# Patient Record
Sex: FEMALE | Race: ASIAN | NOT HISPANIC OR LATINO | Employment: FULL TIME | URBAN - METROPOLITAN AREA
[De-identification: names, ages, dates, MRNs, and addresses within clinical notes are randomized per-mention and may not be internally consistent; named-entity substitution may affect disease eponyms.]

---

## 2017-01-17 ENCOUNTER — HOSPITAL ENCOUNTER (EMERGENCY)
Facility: CLINIC | Age: 47
Discharge: HOME | End: 2017-01-17
Attending: EMERGENCY MEDICINE | Admitting: EMERGENCY MEDICINE
Payer: COMMERCIAL

## 2017-01-17 ENCOUNTER — APPOINTMENT (OUTPATIENT)
Dept: CT IMAGING | Facility: CLINIC | Age: 47
End: 2017-01-17
Attending: EMERGENCY MEDICINE
Payer: COMMERCIAL

## 2017-01-17 VITALS
HEART RATE: 83 BPM | TEMPERATURE: 97.8 F | DIASTOLIC BLOOD PRESSURE: 97 MMHG | OXYGEN SATURATION: 100 % | SYSTOLIC BLOOD PRESSURE: 125 MMHG | RESPIRATION RATE: 16 BRPM

## 2017-01-17 DIAGNOSIS — S06.0X0A CLOSED HEAD INJURY WITH CONCUSSION, WITHOUT LOSS OF CONSCIOUSNESS, INITIAL ENCOUNTER: ICD-10-CM

## 2017-01-17 PROCEDURE — 25900017 H RX MED GY IP 259 OP 259 PS 637: Performed by: EMERGENCY MEDICINE

## 2017-01-17 PROCEDURE — 70450 CT HEAD/BRAIN W/O DYE: CPT

## 2017-01-17 PROCEDURE — 99284 EMERGENCY DEPT VISIT MOD MDM: CPT | Mod: 25

## 2017-01-17 RX ORDER — ONDANSETRON 4 MG/1
4 TABLET, ORALLY DISINTEGRATING ORAL EVERY 8 HOURS PRN
Qty: 20 TABLET | Refills: 0 | Status: SHIPPED | OUTPATIENT
Start: 2017-01-17 | End: 2017-01-20

## 2017-01-17 RX ORDER — MECLIZINE HYDROCHLORIDE 25 MG/1
25 TABLET ORAL ONCE
Status: COMPLETED | OUTPATIENT
Start: 2017-01-17 | End: 2017-01-17

## 2017-01-17 RX ORDER — ONDANSETRON 2 MG/ML
4 INJECTION INTRAMUSCULAR; INTRAVENOUS ONCE
Status: DISCONTINUED | OUTPATIENT
Start: 2017-01-17 | End: 2017-01-17

## 2017-01-17 RX ORDER — ONDANSETRON 4 MG/1
4 TABLET, ORALLY DISINTEGRATING ORAL ONCE
Status: DISCONTINUED | OUTPATIENT
Start: 2017-01-17 | End: 2017-01-17 | Stop reason: HOSPADM

## 2017-01-17 RX ORDER — MECLIZINE HYDROCHLORIDE 25 MG/1
25 TABLET ORAL 4 TIMES DAILY PRN
Qty: 30 TABLET | Refills: 0 | Status: SHIPPED | OUTPATIENT
Start: 2017-01-17 | End: 2017-02-09

## 2017-01-17 RX ADMIN — MECLIZINE HYDROCHLORIDE 25 MG: 25 TABLET ORAL at 16:27

## 2017-01-17 ASSESSMENT — ENCOUNTER SYMPTOMS
NAUSEA: 1
BACK PAIN: 0
NECK PAIN: 0
HEADACHES: 1
DIZZINESS: 1

## 2017-01-17 NOTE — ED AVS SNAPSHOT
[facility]    [address]    [facility] [address]    Phone:  [phone]    Fax:  [phone]                                       Swetha Flores   MRN: [id number]    Department:  [facility]   Date of Visit:  1/17/2017           Patient Information     Date Of Birth          1970        Your diagnoses for this visit were:     Closed head injury with concussion, without loss of consciousness, initial encounter        You were seen by Glenn Vegas MD.      Follow-up Information     Follow up with Nyasia Wise MD In 5 days.    Specialties:  Internal Medicine, Pediatrics    Contact information:    [facility]  [address] [place] [address]  [phone]          Follow up with [facility].    Specialty:  EMERGENCY MEDICINE    Why:  As needed, If symptoms worsen    Contact information:    [address]  [facility] [address]  [phone]        Discharge Instructions       Discharge Instructions  Head Injury    You have been seen today for a head injury. You were checked for serious problems, like bleeding on the brain, but these problems cannot always be found right away.  Due to this risk, you should not be alone for 24 hours after your injury.  Follow up with your regular physician in 5 days. If you are taking a blood thinner, such as aspirin, Pradaxa  (dabigatran), Coumadin  (warfarin), or Plavix  (clopidogrel), you are at especially high risk for immediate or delayed bleeding, and need to re-check with a physician in 24 hours, or sooner if any of the symptoms below happen.     Return to the Emergency Department if:    You are confused, have amnesia, or you are not acting right.    Your headache gets worse or you start to have a really bad headache even with your recommended treatment plan.    You vomit more than once.    You have a convulsion or  seizure.    You have trouble walking.    You have weakness or paralysis in an arm or a leg.    You have blood or fluid coming from your ears or nose.    You have new symptoms or anything that worries you.    Sleeping:  It is okay for you to sleep, but someone should wake you up as instructed by your doctor, and someone should check on you at your usual time to wake up.     Activity:    Do not drive for at least 24 hours.    Do not drive if you have dizzy spells or trouble concentrating, or remembering things.    Do not return to any contact sports until cleared by your regular doctor.     Follow-up:  It is very important that you make an appointment with your clinic and go to the appointment.  If you do not follow-up with your regular doctor, it may result in missing an important development which could result in permanent injury or disability and/or lasting pain.  If there is any problem keeping your appointment, call your doctor or return to the Emergency Department.    MORE INFORMATION:    Concussion:  A concussion is a minor head injury that may cause temporary problems with the way your brain works.  Some symptoms include:  confusion, amnesia, nausea and vomiting, dizziness, fatigue, memory or concentration problems, irritability and sleep problems.    CT Scans: Your evaluation today may have included a CT scan (CAT scan) to look for things like bleeding or a skull fracture (break).  CT scans involve radiation and too many CT scans can cause serious health problems like cancer, especially in children.  Because of this, your doctor may not have ordered a CT scan today if they think you are at low risk for a serious or life threatening problem.    If you were given a prescription for medicine here today, be sure to read all of the information (including the package insert) that comes with your prescription.  This will include important information about the medicine, its side effects, and any warnings that you  "need to know about.  The pharmacist who fills the prescription can provide more information and answer questions you may have about the medicine.  If you have questions or concerns that the pharmacist cannot address, please call or return to the Emergency Department.     Remember that you can always come back to the Emergency Department if you are not able to see your regular doctor in the amount of time listed above, if you get any new symptoms, or if there is anything that worries you.      Concussion (No Wake-Up)    A concussion happens when you hit your head with enough force to shake up the brain. This may cause you to lose consciousness - be \"knocked out\" - but not always. Depending on how hard you hit your head, it will take from a few hours up to a few days to get better. Sometimes symptoms may last a few months or longer. This is called post-concussion syndrome.  At first, you may have a headache, nausea, vomiting, or dizziness. You may also have problems concentrating or remembering things. This is normal.  Symptoms should get better as the hours and days go by. Symptoms that get worse could be a sign of a more serious injury. This might be a bruise or bleeding in the brain. That s why it s important to watch for the warning signs listed below.  Home care  Follow these tips to help care for yourself at home:    During the next day (24 hours) someone must stay with you to check for the signs below.    If your face or scalp swells, apply an ice pack for 20 minutes every 1 to 2 hours. Do this until the swelling starts to go down. You can make an ice pack by putting ice cubes in a plastic bag and wrapping the bag in a towel. for 20 minutes every 1-2 hours until the swelling starts to go down.    You may use acetaminophen to control pain, unless another pain medicine was prescribed. If you have chronic liver or kidney disease, talk with your doctor before using these medicines. Also talk with your doctor if you " ever had a stomach ulcer or GI bleeding.    For the next 24 hours:    Don t drink alcohol or take sedatives or medicines that make you sleepy.    Don t drive or operate machinery.    Avoid doing anything strenuous. Don t lift or strain.    Don t return to sports or any activity that could cause you to hit your head until all symptoms are gone and you have been cleared by your doctor. A second head injury before fully recovering from the first one can lead to serious brain injury.  Follow-up care  Follow up with your doctor in 1 week, or as directed.  Note: A radiologist will review any X-rays or CT scans that were taken. You will be told of any new findings that may affect your care.  When to seek medical care  Get prompt medical attention if any of these occur:    Repeated vomiting    Headache or dizziness that is severe or gets worse    Unusual drowsiness, or unable to wake up as usual    Confusion or change in behavior or speech, or memory loss    Blurred vision    Convulsion (seizure)    Swelling on the scalp or face that gets worse    Redness, warmth, or pus from the swollen area    Fluid draining from or bleeding from the nose or ears  Â   Â  9465-6580 [employer]. [address] [place] [address]. All rights reserved. This information is not intended as a substitute for professional medical care. Always follow your healthcare professional's instructions.              24 Hour Appointment Hotline       To make an appointment at any [facility], call [phone] ([phone]). If you don't have a family doctor or clinic, we will help you find one. [facility] are conveniently located to serve the needs of you and your family.             Review of your medicines      START taking        Dose / Directions Last dose taken    meclizine 25 MG tablet   Commonly known as:  ANTIVERT   Dose:  25 mg   Quantity:  30 tablet        Take 1 tablet (25 mg) by mouth 4 times daily as needed  for dizziness   Refills:  0        ondansetron 4 MG ODT tab   Commonly known as:  ZOFRAN ODT   Dose:  4 mg   Quantity:  20 tablet        Take 1 tablet (4 mg) by mouth every 8 hours as needed for nausea   Refills:  0          Our records show that you are taking the medicines listed below. If these are incorrect, please call your family doctor or clinic.        Dose / Directions Last dose taken    cholecalciferol 35913 UNITS capsule   Commonly known as:  VITAMIN D3   Dose:  1 capsule   Quantity:  8 capsule        Take 1 capsule (50,000 Units) by mouth once a week   Refills:  0        SYNTHROID 125 MCG tablet   Dose:  125 mcg   Quantity:  90 tablet   Generic drug:  levothyroxine        Take 1 tablet (125 mcg) by mouth daily   Refills:  3        WOMENS MULTI VITAMIN & MINERAL PO        Take  by mouth.   Refills:  0                Prescriptions were sent or printed at these locations (2 Prescriptions)                   Other Prescriptions                Printed at Department/Unit printer (2 of 2)         ondansetron (ZOFRAN ODT) 4 MG ODT tab               meclizine (ANTIVERT) 25 MG tablet                Procedures and tests performed during your visit     CT Head w/o Contrast      Orders Needing Specimen Collection     None      Pending Results     Date and Time Order Name Status Description    1/17/2017 1612 CT Head w/o Contrast Preliminary             Pending Culture Results     No orders found from 1/16/2017 to 1/18/2017.       Test Results from your hospital stay           1/17/2017  5:15 PM - Interface, Radiant Ib      Narrative     CT SCAN OF THE HEAD WITHOUT CONTRAST   1/17/2017 5:08 PM     HISTORY: Head injury, vertigo, nausea    TECHNIQUE: Axial images of the head and coronal reformations without  IV contrast material. Radiation dose for this scan was reduced using  automated exposure control, adjustment of the mA and/or kV according  to patient size, or iterative reconstruction technique.    COMPARISON:  None    FINDINGS: There is a small benign-appearing calcification in the  anterior left frontal lobe without any surrounding edema or mass  effect. Brain parenchyma is otherwise normal. Ventricles and  subarachnoid spaces are normal. There is no evidence for intracranial  hemorrhage, mass effect, acute infarct, or skull fracture.        Impression     IMPRESSION:  1. Small benign-appearing calcification in the left frontal lobe.  2. No evidence for intracranial hemorrhage, acute infarct, or any  focal mass lesions.                Clinical Quality Measure: Blood Pressure Screening     Your blood pressure was checked while you were in the emergency department today. The last reading we obtained was  BP: 137/82 mmHg . Please read the guidelines below about what these numbers mean and what you should do about them.  If your systolic blood pressure (the top number) is less than 120 and your diastolic blood pressure (the bottom number) is less than 80, then your blood pressure is normal. There is nothing more that you need to do about it.  If your systolic blood pressure (the top number) is 120-139 or your diastolic blood pressure (the bottom number) is 80-89, your blood pressure may be higher than it should be. You should have your blood pressure rechecked within a year by a primary care provider.  If your systolic blood pressure (the top number) is 140 or greater or your diastolic blood pressure (the bottom number) is 90 or greater, you may have high blood pressure. High blood pressure is treatable, but if left untreated over time it can put you at risk for heart attack, stroke, or kidney failure. You should have your blood pressure rechecked by a primary care provider within the next 4 weeks.  If your provider in the emergency department today gave you specific instructions to follow-up with your doctor or provider even sooner than that, you should follow that instruction and not wait for up to 4 weeks for your  follow-up visit.        Thank you for choosing [facility]       Thank you for choosing [facility] for your care. Our goal is always to provide you with excellent care. Hearing back from our patients is one way we can continue to improve our services. Please take a few minutes to complete the written survey that you may receive in the mail after you visit with us. Thank you!        [employer] Information     [employer] gives you secure access to your electronic health record. If you see a primary care provider, you can also send messages to your care team and make appointments. If you have questions, please call your primary care clinic.  If you do not have a primary care provider, please call [phone] and they will assist you.        Care EveryWhere ID     This is your Care EveryWhere ID. This could be used by other organizations to access your [facility] medical records  [id number]        After Visit Summary       This is your record. Keep this with you and show to your community pharmacist(s) and doctor(s) at your next visit.

## 2017-01-17 NOTE — LETTER
[facility] EMERGENCY DEPARTMENT  [address]  [facility] [address]  [phone]    Swetha Flores  [address] [place] [address]  [phone] (home)     : 1970      To Whom it may concern:    Swetha Flores was seen in our Emergency Department today, 2017.  I expect her condition to improve over the next 5 days.  She may return to work/school when improved.    Sincerely,        Glenn Pina Trigger

## 2017-01-17 NOTE — ED NOTES
Patient comes in for evaluation of dizziness and nausea following a fall. Patient states that she fell on the ice last evening, landed on her right side, hit the right side of the head, denies LOC. Patient reports that she went home felt a headache, was able to eat dinner but developed dizziness and nausea a couple of hours later, tried resting through the night but was unable to sleep well. Today continues to have symptoms and was referred by clinic nurse to come to ER. ABCs intact.

## 2017-01-17 NOTE — ED PROVIDER NOTES
History     Chief Complaint:  Dizziness and Fall      HPI   Swetha Flores is a 46 year old female who presents with dizziness and headache following a fall. The patient slipped and fell on the ice last night and hit her head and right cheek. The patient states that she began to feel dizziness especially while standing as well as nausea and headache. The patient denies loss of consciousness, neck pain, tinnitus, pregnancy, or back pain.    Allergies:  Barley grass - rash     Medications:    Cholecalciferol  Synthroid 125 mcg  Multivitamin     Past Medical History:    Vegan diet  Hypothyroidism  Arthropathy  Ankle pain  Hyperlipidemia  Anxiety  Cervicalgia  Joint pain  Breast cyst  Vitamin D deficiency    Past Surgical History:     x2    Family History:    Father: arthritis, cardiovascular  Mother: Lipids, depression, thyroid disease    Social History:  Marital Status:   Presents to the ED with   PCP: Nyasia Wise     Review of Systems   HENT: Negative for tinnitus.    Gastrointestinal: Positive for nausea.   Musculoskeletal: Negative for back pain and neck pain.   Neurological: Positive for dizziness and headaches. Negative for syncope.   All other systems reviewed and are negative.    Physical Exam   First Vitals:  BP: 137/82 mmHg  Pulse: 83  Temp: 97.8  F (36.6  C)  Resp: 16  SpO2: 100 %    Physical Exam  General: Alert, interactive in mild distress  Head:  Scalp is atraumatic.  No raccoon eyes or pang signs.  Eyes:  The pupils are equal, round, and reactive to light    EOM's intact    No scleral icterus  ENT:      Nose:  The external nose is normal  Ears:  External ears are normal. Normal TM's. No hemotympanum.  Mouth/Throat: The oropharynx is normal    Mucus membranes are moist.      Neck:  Normal range of motion.      There is no rigidity.    Trachea is in the midline         CV:  Regular rate and rhythm    No murmur   Resp:  Breath sounds are clear bilaterally    Non-labored, no  retractions or accessory muscle use   MS:  Normal strength in all 4 extremities, No midline cervical, thoracic, or lumbar tenderness  Skin:  Warm and dry, No rash or lesions noted.  Neuro: Strength 5/5 x4.  Sensation intact  In all 4 extremities.      Cranial nerves 2-12 grossly intact.    GCS: 15  Psych:  Awake. Alert.  Normal affect.      Appropriate interactions.    Emergency Department Course   Imaging:  Radiographic findings were communicated with the patient who voiced understanding of the findings.      CT Head w/o Contrast   Preliminary Result   IMPRESSION:   1. Small benign-appearing calcification in the left frontal lobe.   2. No evidence for intracranial hemorrhage, acute infarct, or any   focal mass lesions.          All Readings Per Radiology Unless Otherwise Noted.    Interventions:  (3147) Meclizine 25 mg, PO    ED Course:  Nursing notes and past medical history reviewed.   I performed a physical examination of the patient as documented above.  I explained the plan with the patient who consents to this.   The patient was sent for a CT head while in the emergency department, findings above.   Findings and plan explained to the patient. Patient discharged home with instructions regarding supportive care, medications, and reasons to return. The importance of close follow-up was reviewed. The patient was prescribed Zofran and meclizine.    Impression & Plan    Medical Decision Making:  Mrs. Flores was seen and evaluated. The above workup was undertaken demonstrating benign calcification noted on head CT, I informed the patient of this. I believe she is likely suffering from post concussive symptoms following her closed head injury. There are no signs of an acute intracranial hemorrhage, meningitis, skull fracture, or more worrisome illness. She has no neck pain to suggest vertebral artery dissection. She received Zofran and meclizine as noted above and was discharged with these medications. I have  recommended rest and close follow up with her primary care provider. If new symptoms develop she will return here.    Diagnosis:    ICD-10-CM    1. Closed head injury with concussion, without loss of consciousness, initial encounter S06.0X0A        Disposition:   Discharge to home as noted above.     Discharge Medications:   New Prescriptions    MECLIZINE (ANTIVERT) 25 MG TABLET    Take 1 tablet (25 mg) by mouth 4 times daily as needed for dizziness    ONDANSETRON (ZOFRAN ODT) 4 MG ODT TAB    Take 1 tablet (4 mg) by mouth every 8 hours as needed for nausea         Ro DUARTE am serving as a scribe on 1/17/2017 at 4:19 PM to personally document services performed by Glenn Vegas MD, based on my observations and the provider's statements to me.      Glenn Vegas MD  01/17/17 2667

## 2017-01-17 NOTE — ED AVS SNAPSHOT
[facility] Emergency Department    [address]    [facility] [address]    Phone:  [phone]    Fax:  [phone]                                       Swetha Flores   MRN: [id number]    Department:  [facility] Emergency Department   Date of Visit:  1/17/2017           After Visit Summary Signature Page     I have received my discharge instructions, and my questions have been answered. I have discussed any challenges I see with this plan with the nurse or doctor.    ..........................................................................................................................................  Patient/Patient Representative Signature      ..........................................................................................................................................  Patient Representative Print Name and Relationship to Patient    ..................................................               ................................................  Date                                            Time    ..........................................................................................................................................  Reviewed by Signature/Title    ...................................................              ..............................................  Date                                                            Time

## 2017-01-17 NOTE — DISCHARGE INSTRUCTIONS
Discharge Instructions  Head Injury    You have been seen today for a head injury. You were checked for serious problems, like bleeding on the brain, but these problems cannot always be found right away.  Due to this risk, you should not be alone for 24 hours after your injury.  Follow up with your regular physician in 5 days. If you are taking a blood thinner, such as aspirin, Pradaxa  (dabigatran), Coumadin  (warfarin), or Plavix  (clopidogrel), you are at especially high risk for immediate or delayed bleeding, and need to re-check with a physician in 24 hours, or sooner if any of the symptoms below happen.     Return to the Emergency Department if:    You are confused, have amnesia, or you are not acting right.    Your headache gets worse or you start to have a really bad headache even with your recommended treatment plan.    You vomit more than once.    You have a convulsion or seizure.    You have trouble walking.    You have weakness or paralysis in an arm or a leg.    You have blood or fluid coming from your ears or nose.    You have new symptoms or anything that worries you.    Sleeping:  It is okay for you to sleep, but someone should wake you up as instructed by your doctor, and someone should check on you at your usual time to wake up.     Activity:    Do not drive for at least 24 hours.    Do not drive if you have dizzy spells or trouble concentrating, or remembering things.    Do not return to any contact sports until cleared by your regular doctor.     Follow-up:  It is very important that you make an appointment with your clinic and go to the appointment.  If you do not follow-up with your regular doctor, it may result in missing an important development which could result in permanent injury or disability and/or lasting pain.  If there is any problem keeping your appointment, call your doctor or return to the Emergency Department.    MORE INFORMATION:    Concussion:  A concussion is a minor head  "injury that may cause temporary problems with the way your brain works.  Some symptoms include:  confusion, amnesia, nausea and vomiting, dizziness, fatigue, memory or concentration problems, irritability and sleep problems.    CT Scans: Your evaluation today may have included a CT scan (CAT scan) to look for things like bleeding or a skull fracture (break).  CT scans involve radiation and too many CT scans can cause serious health problems like cancer, especially in children.  Because of this, your doctor may not have ordered a CT scan today if they think you are at low risk for a serious or life threatening problem.    If you were given a prescription for medicine here today, be sure to read all of the information (including the package insert) that comes with your prescription.  This will include important information about the medicine, its side effects, and any warnings that you need to know about.  The pharmacist who fills the prescription can provide more information and answer questions you may have about the medicine.  If you have questions or concerns that the pharmacist cannot address, please call or return to the Emergency Department.     Remember that you can always come back to the Emergency Department if you are not able to see your regular doctor in the amount of time listed above, if you get any new symptoms, or if there is anything that worries you.      Concussion (No Wake-Up)    A concussion happens when you hit your head with enough force to shake up the brain. This may cause you to lose consciousness - be \"knocked out\" - but not always. Depending on how hard you hit your head, it will take from a few hours up to a few days to get better. Sometimes symptoms may last a few months or longer. This is called post-concussion syndrome.  At first, you may have a headache, nausea, vomiting, or dizziness. You may also have problems concentrating or remembering things. This is normal.  Symptoms should get " better as the hours and days go by. Symptoms that get worse could be a sign of a more serious injury. This might be a bruise or bleeding in the brain. That s why it s important to watch for the warning signs listed below.  Home care  Follow these tips to help care for yourself at home:    During the next day (24 hours) someone must stay with you to check for the signs below.    If your face or scalp swells, apply an ice pack for 20 minutes every 1 to 2 hours. Do this until the swelling starts to go down. You can make an ice pack by putting ice cubes in a plastic bag and wrapping the bag in a towel. for 20 minutes every 1-2 hours until the swelling starts to go down.    You may use acetaminophen to control pain, unless another pain medicine was prescribed. If you have chronic liver or kidney disease, talk with your doctor before using these medicines. Also talk with your doctor if you ever had a stomach ulcer or GI bleeding.    For the next 24 hours:    Don t drink alcohol or take sedatives or medicines that make you sleepy.    Don t drive or operate machinery.    Avoid doing anything strenuous. Don t lift or strain.    Don t return to sports or any activity that could cause you to hit your head until all symptoms are gone and you have been cleared by your doctor. A second head injury before fully recovering from the first one can lead to serious brain injury.  Follow-up care  Follow up with your doctor in 1 week, or as directed.  Note: A radiologist will review any X-rays or CT scans that were taken. You will be told of any new findings that may affect your care.  When to seek medical care  Get prompt medical attention if any of these occur:    Repeated vomiting    Headache or dizziness that is severe or gets worse    Unusual drowsiness, or unable to wake up as usual    Confusion or change in behavior or speech, or memory loss    Blurred vision    Convulsion (seizure)    Swelling on the scalp or face that gets  worse    Redness, warmth, or pus from the swollen area    Fluid draining from or bleeding from the nose or ears  Â   Â  6513-9041 [employer]. [address] [place] [address], [place] [address]. All rights reserved. This information is not intended as a substitute for professional medical care. Always follow your healthcare professional's instructions.

## 2017-01-23 ENCOUNTER — OFFICE VISIT (OUTPATIENT)
Dept: PEDIATRICS | Facility: CLINIC | Age: 47
End: 2017-01-23
Payer: COMMERCIAL

## 2017-01-23 VITALS
TEMPERATURE: 99.3 F | SYSTOLIC BLOOD PRESSURE: 100 MMHG | HEART RATE: 66 BPM | HEIGHT: 59 IN | WEIGHT: 123 LBS | OXYGEN SATURATION: 100 % | DIASTOLIC BLOOD PRESSURE: 70 MMHG | BODY MASS INDEX: 24.8 KG/M2

## 2017-01-23 DIAGNOSIS — S06.0X0D CONCUSSION, WITHOUT LOSS OF CONSCIOUSNESS, SUBSEQUENT ENCOUNTER: ICD-10-CM

## 2017-01-23 DIAGNOSIS — S00.03XD CONTUSION OF SCALP, SUBSEQUENT ENCOUNTER: ICD-10-CM

## 2017-01-23 DIAGNOSIS — F07.81 POST CONCUSSION SYNDROME: Primary | ICD-10-CM

## 2017-01-23 PROCEDURE — 99214 OFFICE O/P EST MOD 30 MIN: CPT | Performed by: PEDIATRICS

## 2017-01-23 NOTE — NURSING NOTE
"Chief Complaint   Patient presents with     ER F/U       Initial /70 mmHg  Pulse 66  Temp(Src) 99.3  F (37.4  C) (Tympanic)  Ht 4' 10.5\" (1.486 m)  Wt 123 lb (55.792 kg)  BMI 25.27 kg/m2  SpO2 100%  LMP 12/22/2016 Estimated body mass index is 25.27 kg/(m^2) as calculated from the following:    Height as of this encounter: 4' 10.5\" (1.486 m).    Weight as of this encounter: 123 lb (55.792 kg).  BP completed using cuff size: regular    "

## 2017-01-23 NOTE — Clinical Note
[facility]  [address]  [place] [address]  [phone]      January 24, 2017    RE:  Swetha Flores                                                                                                                                                       [address] [place] [address]            To whom it may concern:    Swetha Flores is under my professional care for    Post concussion syndrome  Concussion, without loss of consciousness, subsequent encounter  Contusion of scalp, subsequent encounter.       She can start returning to work at reduced hours on 1/24/2017.  I anticipate she will start at 15-20 hours per week and gradually increase to full time over the course of 3 weeks as tolerated.          Sincerely,          Nyasia Wise MD  Internal Medicine - Pediatrics

## 2017-01-23 NOTE — PROGRESS NOTES
"  SUBJECTIVE:                                                    Swetha Flores is a 46 year old female who presents to clinic today for the following health issues:      ED/UC Followup:    Facility:  [facility]   Date of visit: 01-17-17  Reason for visit: Closed head injury with concussion, without loss of consciousness  Current Status: Pt reports improvement, no further vertigo.        Fell on ice in street - hit right side of head, face.  Severe dizziness started the next day, prompting ER visit.  Normal head CT.   Since that time, patient's balance and dizziness symptoms have improved, but are not yet back to baseline.  Symptoms severe the first three days and then markedly improving.   She has not had headaches or visual changes, but does note tenderness over her right temple - this has been an area prone to tenderness for her in the past.   She has not returned to work - her work requires heavy computer use and high concentration - hasn't felt ready for it yet, but now feels that she can start to return to work.   Had planned international travel coming up and is wondering what to do about this.  No new numbness, tingling, weakness, speech deficits.  Has not yet felt comfortable driving -  drove her here today.   Has had some difficulties with sleep.      Disability paperwork completed - plan reduced hours (15-20 per week) until 2/13, at which point anticipate full return to work.      Problem list and histories reviewed & adjusted, as indicated.  Additional history: as documented    Problem list, Medication list, Allergies, and Medical/Social/Surgical histories reviewed in [facility] and updated as appropriate.    ROS:  Constitutional, neuro, psych, msk systems are negative, except as otherwise noted.    OBJECTIVE:                                                    /70 mmHg  Pulse 66  Temp(Src) 99.3  F (37.4  C) (Tympanic)  Ht 4' 10.5\" (1.486 m)  Wt 123 lb (55.792 kg)  BMI 25.27 kg/m2  SpO2 100% "  LMP 12/22/2016  Body mass index is 25.27 kg/(m^2).  GENERAL: alert and no distress  EYES: Eyes grossly normal to inspection, PERRL and conjunctivae and sclerae normal  HENT: ear canals and TM's normal, nose and mouth without ulcers or lesions  NECK: no adenopathy, no asymmetry, masses, or scars and thyroid normal to palpation  RESP: lungs clear to auscultation - no rales, rhonchi or wheezes  CV: regular rate and rhythm, normal S1 S2, no S3 or S4, no murmur, click or rub, no peripheral edema and peripheral pulses strong  MS: tender over right parietal scalp - no obvious hematoma, no breaks in skin, otherwise no gross musculoskeletal defects noted, no edema  NEURO: Normal strength and tone, sensory exam grossly normal, mentation intact, oriented times 3, speech normal, cranial nerves 2-12 intact, DTR's normal and symmetric, gait normal including heel/toe/tandem walking, Romberg normal and rapid alternating movements normal  PSYCH: mentation appears normal, affect normal/bright    Diagnostic Test Results:  Reviewed imaging from ER with patient and family     ASSESSMENT/PLAN:                                                        ICD-10-CM    1. Post concussion syndrome F07.81 [facility]    Reviewed expected course of improvement and graded return to mentally taxing activities (work related) and exercise.  Forms completed allowing graded return to work with full return expected 2/13.  Advised patient against international travel in the next few weeks.   Recommended vestibular therapy to help with some residual balance concerns.   2. Concussion, without loss of consciousness, subsequent encounter S06.0X0D [facility]   3. Contusion of scalp, subsequent encounter S00.03XD Patient to return if pain not improving over next 2 weeks - would plan to image again at that point     25 minutes spent with patient, more than 50% of the time was spent in counseling and  coordination of care of the above issues.      Nyasia Wise MD  [facility]

## 2017-01-26 ENCOUNTER — HOSPITAL ENCOUNTER (OUTPATIENT)
Dept: PHYSICAL THERAPY | Facility: CLINIC | Age: 47
End: 2017-01-26
Attending: PEDIATRICS
Payer: COMMERCIAL

## 2017-01-26 DIAGNOSIS — R42 DIZZINESS: ICD-10-CM

## 2017-01-26 DIAGNOSIS — S06.0X0D CONCUSSION WITHOUT LOSS OF CONSCIOUSNESS, SUBSEQUENT ENCOUNTER: Primary | ICD-10-CM

## 2017-01-26 PROCEDURE — 40000767 ZZHC STATISTIC PT CONCUSSION VISIT: Mod: GP | Performed by: PHYSICAL THERAPIST

## 2017-01-26 PROCEDURE — 97112 NEUROMUSCULAR REEDUCATION: CPT | Mod: GP | Performed by: PHYSICAL THERAPIST

## 2017-01-26 PROCEDURE — 97161 PT EVAL LOW COMPLEX 20 MIN: CPT | Mod: GP | Performed by: PHYSICAL THERAPIST

## 2017-01-29 NOTE — ADDENDUM NOTE
Encounter addended by: Cielo Guallpa PT on: 1/29/2017  1:02 PM<BR>     Documentation filed: Inpatient Document Flowsheet

## 2017-01-29 NOTE — PROGRESS NOTES
01/26/17 1500   Quick Adds   Quick Adds Vestibular Eval   General Information   Start of Care Date 01/29/17   Referring Physician Nyasia Wise MD   Orders Evaluate and Treat as Indicated   Medical Diagnosis Post concussion syndrome, Concussion without loss of conciousness   Pertinent history of current problem (include personal factors and/or comorbidities that impact the POC) Patietn reporting that she had fallen on the ice about 1.5 weeks ago and hit the right side of her head. Reporting pain over right temple and right side of face. Indicating history of migraines that would occur over same area- predominately behind the right eye. States that she did not lose conciousness. Reporting extreme dizziness and balance problems immediately after falling.  This has since improved. The patient reports that she did go to the ER shortly after falling. CT scans did not show anything. Haven't been needing to take medications.    Current Community Support Family/friend caregiver   Patient role/Employment history Employed   Home/Community Accessibility Comments Patient lives in a 2 story hme with her . Has 2 flights of stars within home with a railing present. Reports that she initially did need assist with navigating her home, however, more recently she has not been having difficulty. Will occasionally have her  assist getting in/out of tub for safety.    Assistive Devices Comments Does not utilize an AD   Patient/Family Goals Statement resolve all concussion symptoms   General Information Comments Patient works as a . Has been working part time since concussion. Reports this has been going well. Is planning to return to full time work soon.    Fall Risk Screen   Fall screen completed by PT   Per patient - Fall 2 or more times in past year? No   Per patient - Fall with injury in past year? Yes   Is patient a fall risk? No   Fall screen comments Patient did experience 1 fall that  resulting in concussion. Initally, had been demonstrating significant dizziness and balance deficits but this has since improved. Scoring well on FGA with scores indicating minimal risk of falling   System Outcome Measures   Outcome Measures Concussion (see Concussion Symptom Assessment)   Pain   Pain comments Indicating headache pain over right temple. See CSA.   Cognitive Status Examination   Orientation orientation to person, place and time   Level of Consciousness alert   Follows Commands and Answers Questions 100% of the time   Personal Safety and Judgment intact   Memory intact   Integumentary   Integumentary No deficits were identified   Posture   Posture Normal   Range of Motion (ROM)   ROM Quick Adds no deficits were identified   Strength   Strength Comments Functioanlly assessed through participation in gait, trnasfers, and dynamic balance assessments. Strength was determined to be within functional limits and graded at least 3/5 as patient is able to lift bilateral extremities agaisnt gravity without difficulty.    Transfer Skills   Transfer Comments IND with STS and pivot transfers. Demonstrati ng good stability throughout transitional movements.    Gait   Gait Comments IND, good stability noted with static and dynamic balance activities.    Gait Special Tests   Gait Special Tests FUNCTIONAL GAIT ASSESSMENT   Gait Special Tests Functional Gait Assessment Score out of 30   Score out of 30 26   Comments Patient scoring 26/30 indicating minimal risk of falling. See flowsheet for greater detail.    Balance   Balance Comments See above in gait comments and scoring for FGA and SLS   Balance Special Tests   Balance Special Tests Single leg stance right;Single leg stance left   Balance Special Tests Single Leg Stance Right,   Right, seconds 20 Seconds   Comments Good stability noted throughout. Normal degree of sway.   Balance Special Tests Single Leg Stance Left   Left, seconds 20 Seconds   Comments Good  stability noted throughout, normal degree of sway.    Sensory Examination   Sensory Perception no deficits were identified   Coordination   Coordination no deficits were identified   Muscle Tone   Muscle Tone no deficits were identified   Oculomotor Exam   Smooth Pursuit Normal   Saccades Normal   VOR Abnormal   VOR Comments Patient perfomring self paced head turns at slow rate. Reporting diziness with completion of task.   VOR Cancellation Abnormal   VOR Cancellation Comments Patient performing VOR cancellation and indicating increased dizziness and headache symptoms within approximately 10 seconds.    Convergence Testing Normal   Dynamic Visual Acuity (DVA)   DVA Comments Held testing due to patient's reported increase in headache following VOR cancellation.    Planned Therapy Interventions   Planned Therapy Interventions balance training;ADL retraining;IADL retraining;gait training;neuromuscular re-education;ROM;strengthening;stretching  (Vestibular rehab)   Clinical Impression   Criteria for Skilled Therapeutic Interventions Met yes, treatment indicated   PT Diagnosis Decreased functional mobility and toelrance for ADLs, IADLs and work speciif tasks.    Influenced by the following impairments Post concussion syndrome and residual deficits in gaze stability, dizziness, headache, motion sensitivity, and noise sensitivity   Functional limitations due to impairments particiaption in ADLs, IADLs, and work.    Clinical Presentation Stable/Uncomplicated   Clinical Presentation Rationale patient presents with concussion and residual symptoms that limit her ability to particiapte in ADls, IADls, and work activities. The patietn also presents with hisotry of migraine, but is otherwise healthy and IND at baseline.    Clinical Decision Making (Complexity) Low complexity   Therapy Frequency 1 time/week   Predicted Duration of Therapy Intervention (days/wks) 4 visits   Risk & Benefits of therapy have been explained Yes    Patient, Family & other staff in agreement with plan of care Yes   Clinical Impression Comments The patient presents with post concussion syndrome and residual symptoms that are limiting her ability to return to work and particiapte fully in ADLs, IADLs, and prefferred activities of daily living. Since her concussion 1.5 weeks ago, she has demonstrated significant improvement in balance. However, she continues to demonstrate deficits in gaze stability and motion sensitivity. She will benefit from skilled vestibualr physicla therapy to facilitate gains in identified areas, assist with symptom management and facilitate return to baseline level of function.    GOALS   PT Eval Goals 1;2;3   Goal 1   Goal Identifier CSA   Goal Description The pateint will score 5 or less on CSA to demonstrate improved concussion symptom mnanagament.   Target Date 03/02/17   Goal 2   Goal Identifier HEP   Goal Description The patient will be IND in performance of vestibular rehab programming to facilitate gains in gaze stability and motion sensitivity outside of therapy.    Target Date 03/02/17   Goal 3   Goal Identifier Return to activity   Goal Description The aptient will report being able to return to normal activity levels and work without restriction from concussion symptoms to demonstrate improved toelrance for activity.    Target Date 03/02/17   Total Evaluation Time   Total Evaluation Time (Minutes) 45     Addendum: Patient has not been seen by this provider since time of evaluation and day of treatment. Will be discharged at this time. Please utilize this evaluation as discharge summary.

## 2017-02-09 ENCOUNTER — OFFICE VISIT (OUTPATIENT)
Dept: PEDIATRICS | Facility: CLINIC | Age: 47
End: 2017-02-09
Payer: COMMERCIAL

## 2017-02-09 VITALS
BODY MASS INDEX: 25 KG/M2 | HEIGHT: 59 IN | OXYGEN SATURATION: 100 % | TEMPERATURE: 99.8 F | HEART RATE: 74 BPM | WEIGHT: 124 LBS | DIASTOLIC BLOOD PRESSURE: 70 MMHG | SYSTOLIC BLOOD PRESSURE: 102 MMHG

## 2017-02-09 DIAGNOSIS — G44.209 TENSION HEADACHE: ICD-10-CM
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
## 2017-02-09 DIAGNOSIS — F07.81 POST CONCUSSION SYNDROME: Primary | ICD-10-CM

## 2017-02-09 PROCEDURE — 99213 OFFICE O/P EST LOW 20 MIN: CPT | Performed by: PEDIATRICS

## 2017-02-09 NOTE — PROGRESS NOTES
"Progress Note  2/9/17    Reason for visit: follow up of concussion    Subjective:  Swetha Flores is a 46 year old female with a history of concussion on 1/17 who returns to clinic for persistent mild headache. She continues to have a low grade pain right around her right temple extending towards eyebrow - this pain was severe at the time of her last visit. She also has some night time nausea.  Denies morning nausea or any vomiting. She works with intensely with computer and has been gradually working more, with frequent breaks. She feels pain is manageable without medications, but is concerned because daily right sided temporal pain is not going away. She feels her memory is okay, her  and coworkers have not noticed any marked changes in thought processes.    She feels slightly nauseated and dizzy when working for too many hours and has to lie down.  She finds that she does better when working from home and is able to rest for short periods during the day.   She denies any concentration problems.    Objective:  /70 mmHg  Pulse 74  Temp(Src) 99.8  F (37.7  C) (Tympanic)  Ht 4' 10.5\" (1.486 m)  Wt 124 lb (56.246 kg)  BMI 25.47 kg/m2  SpO2 100%  LMP 12/22/2016  General: Alert, oriented, no acute distress  HEENT: Tenderness around right temporal region extending to supraorbital ridge. Anicteric sclera, EOMI, PERRL, nares without drainage, throat without exudate.  Skin: No rashes, bruises, or other lesions.  Psych: Normal affect.  Well groomed.  Excellent eye contact  Neuro: CN II-XII intact. Normal gait and fluent speech.  Excellent short and long term memory.  Nonfocal exam.      Medications, past medical, social history reviewed.    Assessment and Plan:  Swetha Flores is a 46 year old female with a history of concussion on 1/17 who returns to clinic for persistent right sided temporal pain, post concussion syndrome.      ICD-10-CM    1. Post concussion syndrome F07.81    2. Tension headache G44.209    3. " Fall from slipping on ice, sequela W00.9XXS          1. Post-concussion syndrome  Explained that she is still in the recovery phase of her concussion, may take many months to feel normal again. Facial pain is healing well other than some tenderness in right temporal region. Reasonable to extend her temporary disability status given the intense nature of her work and letter written to this effect today. Discussed that we can refer her to neurology in the future for cognitive testing if she feels that her abilities have not recovered.    - increase workload as tolerated, message through [employer] if symptoms worsen or do not improve  - continue vestibular therapy exercises provided by physical therapy - use Tylenol or ibuprofen as needed      Nyasia Wise MD  Internal Medicine - Pediatrics

## 2017-02-09 NOTE — NURSING NOTE
"Chief Complaint   Patient presents with     RECHECK       Initial /70 mmHg  Pulse 74  Temp(Src) 99.8  F (37.7  C) (Tympanic)  Ht 4' 10.5\" (1.486 m)  Wt 124 lb (56.246 kg)  BMI 25.47 kg/m2  SpO2 100%  LMP 12/22/2016 Estimated body mass index is 25.47 kg/(m^2) as calculated from the following:    Height as of this encounter: 4' 10.5\" (1.486 m).    Weight as of this encounter: 124 lb (56.246 kg).  Medication Reconciliation: complete  "

## 2017-02-09 NOTE — PATIENT INSTRUCTIONS
Please let me know how you are doing this time next week.    Let me know if you need additional paperwork completed.    Continue your physical therapy exercises.    Limit your work day to 6-7 hours for the next few weeks.    If you notice worsening or failure to continue to improve, we will have you see neurology.    Feel free to use tylenol or ibuprofen as needed for headache.

## 2017-02-09 NOTE — Clinical Note
[facility]  [address] [place] [address]  [phone]      February 9, 2017    RE:  Swetha Flores                                                                                                                                                       [address] [place] [address]            To whom it may concern:    Swetha Flores is under my professional care for post concussion syndrome.   I recommend that she maintain short work days, limited to 6-7 hours while she continues to recover.    I anticipate she will require these schedule changes through March 1st.        Sincerely,          Nyasia Wise MD  Internal Medicine - Pediatrics

## 2017-02-09 NOTE — MR AVS SNAPSHOT
After Visit Summary   2/9/2017    Swetha Flores    MRN: [id number]           Patient Information     Date Of Birth          1970        Visit Information        Provider Department      2/9/2017 10:20 AM Nyasia [facility]        Care Instructions    Please let me know how you are doing this time next week.    Let me know if you need additional paperwork completed.    Continue your physical therapy exercises.    Limit your work day to 6-7 hours for the next few weeks.    If you notice worsening or failure to continue to improve, we will have you see neurology.    Feel free to use tylenol or ibuprofen as needed for headache.        Follow-ups after your visit        Who to contact     If you have questions or need follow up information about today's clinic visit or your schedule please contact [facility] directly at [phone].  Normal or non-critical lab and imaging results will be communicated to you by Isis Parenting[employer], letter or phone within 4 business days after the clinic has received the results. If you do not hear from us within 7 days, please contact the clinic through Isis Parentinghart or phone. If you have a critical or abnormal lab result, we will notify you by phone as soon as possible.  Submit refill requests through [employer] or call your pharmacy and they will forward the refill request to us. Please allow 3 business days for your refill to be completed.          Additional Information About Your Visit        MyChart Information     [employer] gives you secure access to your electronic health record. If you see a primary care provider, you can also send messages to your care team and make appointments. If you have questions, please call your primary care clinic.  If you do not have a primary care provider, please call [phone] and they will assist you.        Care EveryWhere ID     This is your Care EveryWhere ID. This could be used by other organizations to  "access your [place] medical records  [id number]        Your Vitals Were     Pulse Temperature Height BMI (Body Mass Index) Pulse Oximetry Last Period    74 99.8  F (37.7  C) (Tympanic) 4' 10.5\" (1.486 m) 25.47 kg/m2 100% 12/22/2016       Blood Pressure from Last 3 Encounters:   02/09/17 102/70   01/23/17 100/70   01/17/17 125/97    Weight from Last 3 Encounters:   02/09/17 124 lb (56.246 kg)   01/23/17 123 lb (55.792 kg)   05/02/16 124 lb (56.246 kg)              Today, you had the following     No orders found for display       Primary Care Provider Office Phone # Fax #    Nyasia Wise [phone]       [facility] [address] [place] [address]        Thank you!     Thank you for choosing [facility]  for your care. Our goal is always to provide you with excellent care. Hearing back from our patients is one way we can continue to improve our services. Please take a few minutes to complete the written survey that you may receive in the mail after your visit with us. Thank you!             Your Updated Medication List - Protect others around you: Learn how to safely use, store and throw away your medicines at www.disposemymeds.org.          This list is accurate as of: 2/9/17 10:47 AM.  Always use your most recent med list.                   Brand Name Dispense Instructions for use    cholecalciferol 30514 UNITS capsule    VITAMIN D3    8 capsule    Take 1 capsule (50,000 Units) by mouth once a week       SYNTHROID 125 MCG tablet   Generic drug:  levothyroxine     90 tablet    Take 1 tablet (125 mcg) by mouth daily       WOMENS MULTI VITAMIN & MINERAL PO      Take  by mouth.         "

## 2017-02-13 PROBLEM — F07.81 POST CONCUSSION SYNDROME: Status: ACTIVE | Noted: 2017-02-13

## 2017-03-02 ENCOUNTER — MYC MEDICAL ADVICE (OUTPATIENT)
Dept: PEDIATRICS | Facility: CLINIC | Age: 47
End: 2017-03-02

## 2017-08-14 DIAGNOSIS — E03.9 HYPOTHYROIDISM, UNSPECIFIED TYPE: ICD-10-CM

## 2017-08-15 RX ORDER — LEVOTHYROXINE SODIUM 125 MCG
125 TABLET ORAL DAILY
Qty: 30 TABLET | Refills: 0 | Status: SHIPPED | OUTPATIENT
Start: 2017-08-15 | End: 2017-09-15

## 2017-08-15 NOTE — TELEPHONE ENCOUNTER
Patient calling-is traveling out of the country for two weeks, and is out of medication.  Medication is being filled for 1 time refill only due to:  pt is due for labs and physical exam   This is scheduled on 09/14/17.  AFSHIN Ballesteros RN

## 2017-08-15 NOTE — TELEPHONE ENCOUNTER
SYNTHROID 125 MCG tablet     Last Written Prescription Date: 5/18/2017  Last Quantity: 90, # refills: 0  Last Office Visit with G, P or [facility] prescribing provider: 2/9/2017        TSH   Date Value Ref Range Status   05/06/2016 0.95 0.40 - 4.00 mU/L Final

## 2017-09-14 ENCOUNTER — OFFICE VISIT (OUTPATIENT)
Dept: PEDIATRICS | Facility: CLINIC | Age: 47
End: 2017-09-14
Payer: COMMERCIAL

## 2017-09-14 VITALS
SYSTOLIC BLOOD PRESSURE: 92 MMHG | OXYGEN SATURATION: 100 % | TEMPERATURE: 99.4 F | BODY MASS INDEX: 25.01 KG/M2 | HEIGHT: 59 IN | DIASTOLIC BLOOD PRESSURE: 60 MMHG | WEIGHT: 124.06 LBS | HEART RATE: 69 BPM

## 2017-09-14 DIAGNOSIS — E55.9 VITAMIN D DEFICIENCY: ICD-10-CM

## 2017-09-14 DIAGNOSIS — Z00.00 ENCOUNTER FOR ROUTINE ADULT HEALTH EXAMINATION WITHOUT ABNORMAL FINDINGS: Primary | ICD-10-CM

## 2017-09-14 DIAGNOSIS — E03.9 HYPOTHYROIDISM, UNSPECIFIED TYPE: ICD-10-CM

## 2017-09-14 DIAGNOSIS — Z78.9 VEGAN DIET: ICD-10-CM

## 2017-09-14 DIAGNOSIS — Z12.31 VISIT FOR SCREENING MAMMOGRAM: ICD-10-CM

## 2017-09-14 PROBLEM — F07.81 POST CONCUSSION SYNDROME: Status: RESOLVED | Noted: 2017-02-13 | Resolved: 2017-09-14

## 2017-09-14 LAB
ALBUMIN SERPL-MCNC: 3.8 G/DL (ref 3.4–5)
ALP SERPL-CCNC: 50 U/L (ref 40–150)
ALT SERPL W P-5'-P-CCNC: 21 U/L (ref 0–50)
ANION GAP SERPL CALCULATED.3IONS-SCNC: 7 MMOL/L (ref 3–14)
AST SERPL W P-5'-P-CCNC: 11 U/L (ref 0–45)
BILIRUB SERPL-MCNC: 0.7 MG/DL (ref 0.2–1.3)
BUN SERPL-MCNC: 9 MG/DL (ref 7–30)
CALCIUM SERPL-MCNC: 9.2 MG/DL (ref 8.5–10.1)
CHLORIDE SERPL-SCNC: 107 MMOL/L (ref 94–109)
CHOLEST SERPL-MCNC: 206 MG/DL
CO2 SERPL-SCNC: 25 MMOL/L (ref 20–32)
CREAT SERPL-MCNC: 0.64 MG/DL (ref 0.52–1.04)
GFR SERPL CREATININE-BSD FRML MDRD: >90 ML/MIN/1.7M2
GLUCOSE SERPL-MCNC: 82 MG/DL (ref 70–99)
HDLC SERPL-MCNC: 65 MG/DL
LDLC SERPL CALC-MCNC: 127 MG/DL
NONHDLC SERPL-MCNC: 141 MG/DL
POTASSIUM SERPL-SCNC: 4 MMOL/L (ref 3.4–5.3)
PROT SERPL-MCNC: 7.5 G/DL (ref 6.8–8.8)
SODIUM SERPL-SCNC: 139 MMOL/L (ref 133–144)
T4 FREE SERPL-MCNC: 1.5 NG/DL (ref 0.76–1.46)
TRIGL SERPL-MCNC: 68 MG/DL
TSH SERPL DL<=0.005 MIU/L-ACNC: 0.15 MU/L (ref 0.4–4)
VIT B12 SERPL-MCNC: 401 PG/ML (ref 193–986)

## 2017-09-14 PROCEDURE — 84443 ASSAY THYROID STIM HORMONE: CPT | Performed by: PEDIATRICS

## 2017-09-14 PROCEDURE — 82607 VITAMIN B-12: CPT | Performed by: PEDIATRICS

## 2017-09-14 PROCEDURE — 80053 COMPREHEN METABOLIC PANEL: CPT | Performed by: PEDIATRICS

## 2017-09-14 PROCEDURE — 80061 LIPID PANEL: CPT | Performed by: PEDIATRICS

## 2017-09-14 PROCEDURE — 82306 VITAMIN D 25 HYDROXY: CPT | Performed by: PEDIATRICS

## 2017-09-14 PROCEDURE — 84439 ASSAY OF FREE THYROXINE: CPT | Performed by: PEDIATRICS

## 2017-09-14 PROCEDURE — 36415 COLL VENOUS BLD VENIPUNCTURE: CPT | Performed by: PEDIATRICS

## 2017-09-14 PROCEDURE — 99396 PREV VISIT EST AGE 40-64: CPT | Performed by: PEDIATRICS

## 2017-09-14 NOTE — PATIENT INSTRUCTIONS
Continue your great exercise    Labs today on the first floor    I will refill your synthroid when I see your labs    Think about a flu shot

## 2017-09-14 NOTE — PROGRESS NOTES
SUBJECTIVE:   CC: Swetha Flores is an 46 year old woman who presents for preventive health visit.     Physical   Annual:     Getting at least 3 servings of Calcium per day::  Yes    Bi-annual eye exam::  Yes    Dental care twice a year::  NO    Sleep apnea or symptoms of sleep apnea::  None    Diet::  Vegetarian/vegan    Frequency of exercise::  2-3 days/week    Duration of exercise::  15-30 minutes    Taking medications regularly::  Yes    Medication side effects::  None    Additional concerns today::  No  Answers for HPI/ROS submitted by the patient on 9/11/2017   PHQ-2 Score: 0      Today's PHQ-2 Score:   PHQ-2 ( 1999 [employer]) 9/11/2017   Q1: Little interest or pleasure in doing things 0   Q2: Feeling down, depressed or hopeless 0   PHQ-2 Score 0   Q1: Little interest or pleasure in doing things Not at all   Q2: Feeling down, depressed or hopeless Not at all   PHQ-2 Score 0       Abuse: Current or Past(Physical, Sexual or Emotional)- No  Do you feel safe in your environment - Yes    Social History   Substance Use Topics     Smoking status: Never Smoker     Smokeless tobacco: Never Used     Alcohol use No     The patient does not drink >3 drinks per day nor >7 drinks per week.    Reviewed orders with patient.  Reviewed health maintenance and updated orders accordingly - Yes    Patient under age 50, mutual decision reflected in health maintenance.        Pertinent mammograms are reviewed under the imaging tab.  History of abnormal Pap smear: NO - age 30- 65 PAP every 3 years recommended    Reviewed and updated as needed this visit by clinical staffTobacco  Meds  Med Hx  Surg Hx  Fam Hx  Soc Hx        Reviewed and updated as needed this visit by Provider              Recovered well from concussion this winter.  No residual neurologic symptoms.    Hypothyroidism - taking medication regularly.  No symptoms of low or high thyroid.  Needs name brand synthroid.      Vitamin D def - on daily MVI    Follows  "vegetarian/vegan diet.      Working on exercise.         ROS: 10 point ROS neg other than the symptoms noted above in the HPI.         OBJECTIVE:   BP 92/60 (BP Location: Right arm, Patient Position: Sitting, Cuff Size: Adult Regular)  Pulse 69  Temp 99.4  F (37.4  C) (Tympanic)  Ht 4' 10.5\" (1.486 m)  Wt 124 lb 1 oz (56.3 kg)  LMP 08/22/2017 (Exact Date)  SpO2 100%  BMI 25.49 kg/m2  EXAM:  GENERAL: healthy, alert and no distress  EYES: Eyes grossly normal to inspection, PERRL and conjunctivae and sclerae normal  HENT: ear canals and TM's normal, nose and mouth without ulcers or lesions  NECK: no adenopathy, no asymmetry, masses, or scars and thyroid normal to palpation  RESP: lungs clear to auscultation - no rales, rhonchi or wheezes  BREAST: normal without masses, tenderness or nipple discharge and no palpable axillary masses or adenopathy  CV: regular rate and rhythm, normal S1 S2, no S3 or S4, no murmur, click or rub, no peripheral edema and peripheral pulses strong  ABDOMEN: soft, nontender, no hepatosplenomegaly, no masses and bowel sounds normal  MS: no gross musculoskeletal defects noted, no edema  SKIN: no suspicious lesions or rashes  NEURO: Normal strength and tone, mentation intact and speech normal  PSYCH: mentation appears normal, affect normal/bright    ASSESSMENT/PLAN:       ICD-10-CM    1. Encounter for routine adult health examination without abnormal findings Z00.00 Comprehensive metabolic panel     Lipid panel reflex to direct LDL   2. Hypothyroidism, unspecified type E03.9 TSH WITH FREE T4 REFLEX  Will refill synthroid (DIANA) based on results with dose adjustments as necessary.   3. Visit for screening mammogram Z12.31 MA Screen Bilateral w/Vladimir   4. Vegan diet Z78.9 Vitamin B12   5. Vitamin D deficiency E55.9 Vitamin D Deficiency       COUNSELING:  Special attention given to:        Regular exercise       Healthy diet/nutrition       Vision screening       Osteoporosis Prevention/Bone " "Health         reports that she has never smoked. She has never used smokeless tobacco.    Estimated body mass index is 25.49 kg/(m^2) as calculated from the following:    Height as of this encounter: 4' 10.5\" (1.486 m).    Weight as of this encounter: 124 lb 1 oz (56.3 kg).   Weight management plan: Discussed healthy diet and exercise guidelines and patient will follow up in 12 months in clinic to re-evaluate.    Counseling Resources:  ATP IV Guidelines  Pooled Cohorts Equation Calculator  Breast Cancer Risk Calculator  FRAX Risk Assessment  ICSI Preventive Guidelines  Dietary Guidelines for Americans, 2010  USDA's MyPlate  ASA Prophylaxis  Lung CA Screening    Nyasia Wise MD  [facility]  "

## 2017-09-14 NOTE — NURSING NOTE
"Chief Complaint   Patient presents with     Physical       Initial BP 92/60 (BP Location: Right arm, Patient Position: Sitting, Cuff Size: Adult Regular)  Pulse 69  Temp 99.4  F (37.4  C) (Tympanic)  Ht 4' 10.5\" (1.486 m)  Wt 124 lb 1 oz (56.3 kg)  LMP 08/22/2017 (Exact Date)  SpO2 100%  BMI 25.49 kg/m2 Estimated body mass index is 25.49 kg/(m^2) as calculated from the following:    Height as of this encounter: 4' 10.5\" (1.486 m).    Weight as of this encounter: 124 lb 1 oz (56.3 kg).  Medication Reconciliation: ally Mosley      "

## 2017-09-14 NOTE — MR AVS SNAPSHOT
After Visit Summary   9/14/2017    Swetha Flores    MRN: [id number]           Patient Information     Date Of Birth          1970        Visit Information        Provider Department      9/14/2017 8:40 AM Nyasia [facility] MD [facility]        Today's Diagnoses     Encounter for routine adult health examination without abnormal findings    -  1    Hypothyroidism, unspecified type        Visit for screening mammogram        Vegan diet        Vitamin D deficiency          Care Instructions    Continue your great exercise    Labs today on the first floor    I will refill your synthroid when I see your labs    Think about a flu shot          Follow-ups after your visit        Future tests that were ordered for you today     Open Future Orders        Priority Expected Expires Ordered    MA Screen Bilateral w/Vladimir Routine  9/14/2018 9/14/2017            Who to contact     If you have questions or need follow up information about today's clinic visit or your schedule please contact [facility] directly at [phone].  Normal or non-critical lab and imaging results will be communicated to you by MyChart, letter or phone within 4 business days after the clinic has received the results. If you do not hear from us within 7 days, please contact the clinic through [employer] or phone. If you have a critical or abnormal lab result, we will notify you by phone as soon as possible.  Submit refill requests through [employer] or call your pharmacy and they will forward the refill request to us. Please allow 3 business days for your refill to be completed.          Additional Information About Your Visit        MyChart Information     [employer] gives you secure access to your electronic health record. If you see a primary care provider, you can also send messages to your care team and make appointments. If you have questions, please call your primary care clinic.  If you do not have a primary care  "provider, please call [phone] and they will assist you.        Care EveryWhere ID     This is your Care EveryWhere ID. This could be used by other organizations to access your [place] medical records  [id number]        Your Vitals Were     Pulse Temperature Height Last Period Pulse Oximetry BMI (Body Mass Index)    69 99.4  F (37.4  C) (Tympanic) 4' 10.5\" (1.486 m) 08/22/2017 (Exact Date) 100% 25.49 kg/m2       Blood Pressure from Last 3 Encounters:   09/14/17 92/60   02/09/17 102/70   01/23/17 100/70    Weight from Last 3 Encounters:   09/14/17 124 lb 1 oz (56.3 kg)   02/09/17 124 lb (56.2 kg)   01/23/17 123 lb (55.8 kg)              We Performed the Following     Comprehensive metabolic panel     Lipid panel reflex to direct LDL     TSH WITH FREE T4 REFLEX     Vitamin B12     Vitamin D Deficiency          Today's Medication Changes          These changes are accurate as of: 9/14/17  9:17 AM.  If you have any questions, ask your nurse or doctor.               Stop taking these medicines if you haven't already. Please contact your care team if you have questions.     cholecalciferol 35552 UNITS capsule   Commonly known as:  VITAMIN D3   Stopped by:  Nyasia Wise MD                    Primary Care Provider Office Phone # Fax #    Nyasia Wise [phone] [address] [place] [address]        Equal Access to Services     [facility] AH: Hadii solomon Mcintyre, waaxda gissell, qaybta kaaljerman murillo. So [facility] [phone].    ATENCIÓN: Si habla español, tiene a crouch disposición servicios gratuitos de asistencia lingüística. Llame al [phone].    We comply with applicable federal civil rights laws and [place] laws. We do not discriminate on the basis of race, color, national origin, age, disability sex, sexual orientation or gender identity.            Thank you!     Thank you for choosing [place] [facility]  for your care. Our goal is always to provide you with excellent care. Hearing back from our patients is one way we can continue to improve our services. Please take a few minutes to complete the written survey that you may receive in the mail after your visit with us. Thank you!             Your Updated Medication List - Protect others around you: Learn how to safely use, store and throw away your medicines at www.disposemymeds.org.          This list is accurate as of: 9/14/17  9:17 AM.  Always use your most recent med list.                   Brand Name Dispense Instructions for use Diagnosis    SYNTHROID 125 MCG tablet   Generic drug:  levothyroxine     30 tablet    Take 1 tablet (125 mcg) by mouth daily    Hypothyroidism, unspecified type       WOMENS MULTI VITAMIN & MINERAL PO      Take  by mouth.    Unspecified hypothyroidism

## 2017-09-15 LAB — DEPRECATED CALCIDIOL+CALCIFEROL SERPL-MC: 27 UG/L (ref 20–75)

## 2017-09-15 RX ORDER — LEVOTHYROXINE SODIUM 112 UG/1
112 TABLET ORAL DAILY
Qty: 90 TABLET | Refills: 3 | Status: SHIPPED | OUTPATIENT
Start: 2017-09-15 | End: 2018-08-25

## 2017-12-21 ENCOUNTER — OFFICE VISIT (OUTPATIENT)
Dept: PEDIATRICS | Facility: CLINIC | Age: 47
End: 2017-12-21
Payer: COMMERCIAL

## 2017-12-21 ENCOUNTER — TELEPHONE (OUTPATIENT)
Dept: PEDIATRICS | Facility: CLINIC | Age: 47
End: 2017-12-21

## 2017-12-21 VITALS
OXYGEN SATURATION: 100 % | HEART RATE: 78 BPM | HEIGHT: 59 IN | DIASTOLIC BLOOD PRESSURE: 70 MMHG | BODY MASS INDEX: 25.8 KG/M2 | WEIGHT: 128 LBS | TEMPERATURE: 98.7 F | SYSTOLIC BLOOD PRESSURE: 106 MMHG

## 2017-12-21 DIAGNOSIS — R42 DIZZINESS: Primary | ICD-10-CM

## 2017-12-21 DIAGNOSIS — Z87.820 HISTORY OF CONCUSSION: ICD-10-CM

## 2017-12-21 PROCEDURE — 99214 OFFICE O/P EST MOD 30 MIN: CPT | Performed by: PEDIATRICS

## 2017-12-21 RX ORDER — MECLIZINE HYDROCHLORIDE 25 MG/1
25 TABLET ORAL 3 TIMES DAILY PRN
Qty: 30 TABLET | Refills: 1 | Status: SHIPPED | OUTPATIENT
Start: 2017-12-21 | End: 2018-10-09

## 2017-12-21 NOTE — NURSING NOTE
"Chief Complaint   Patient presents with     Dizziness       Initial /70 (BP Location: Right arm, Patient Position: Right side, Cuff Size: Adult Regular)  Pulse 78  Temp 98.7  F (37.1  C) (Tympanic)  Ht 4' 10.5\" (1.486 m)  Wt 128 lb (58.1 kg)  SpO2 100%  BMI 26.3 kg/m2 Estimated body mass index is 26.3 kg/(m^2) as calculated from the following:    Height as of this encounter: 4' 10.5\" (1.486 m).    Weight as of this encounter: 128 lb (58.1 kg).  Medication Reconciliation: complete  "

## 2017-12-21 NOTE — TELEPHONE ENCOUNTER
Since Sunday has noted dizziness in the am upon arising.   Has the sensation of being off balance.  Very mild headache and some nausea.  The week prior to this beginning she had done some new Yoga classes and tried running.  Patient had concussion one year ago, but no recent injuries.  Wondering if concussion symptoms are recurring?  Advised patient that I don't believe this is related to the concussion.  Dizziness can be caused by many factors.  Patient scheduled an appointment this afternoon with Dr. Wise.  No further questions.  Will call back if any other questions or concerns.  AFSHIN Ballesteros RN

## 2017-12-21 NOTE — PATIENT INSTRUCTIONS
Use meclizine as needed for dizziness/motion sickness    Stay well hydrated!    Call for visit with neurology - # below

## 2017-12-21 NOTE — PROGRESS NOTES
SUBJECTIVE:   Swetha Flores is a 47 year old female who presents to clinic today for the following health issues:      Dizziness      Duration: Started Sunday    Description   Feeling faint:  no   Feeling like the surroundings are moving: YES- feels unbalanced when she wakes up in the morning and late in the evenings. Feels off balanced on right side of skull.   Loss of consciousness or falls: no     Intensity:  moderate    Accompanying signs and symptoms:   Nausea/vomitting: YES- Nausea   Palpitations: no   Weakness in arms or legs: no   Vision or speech changes: no   Ringing in ears (Tinnitus): no   Hearing loss related to dizziness: no   Other (fevers/chills/sweating/dyspnea): no     History (similar episodes/head trauma/previous evaluation/recent bleeding): pt had concussion in Jan,01/17    Precipitating or alleviating factors (new meds/chemicals): None  Worse with activity/head movement: no     Therapies tried and outcome: None        Concussion last year after fall on ice - was severe and impacted her significantly - had returned to full functioning without symptoms.  Fall was 11 months ago.    Recently exercising a lot and doing more Yoga classes - more intense poses recently including inversions and more strenuous poses - felt dizziness that resolved during class.    Wihtin the last week after more intense yoga classes, her dizziness has persisted.  Notices an unbalanced feeling for about 2 minutes in the morning and the sensation recurs at night.  No falls.  No vertigo.  No numbness/tingling/weakness, memory or concentration concerns.  No speech changes.   Feeling is reminiscent of her symptoms with her concussion last year - wondering if her yoga triggered a recurrence.  No vomiting, but when dizzy and during other parts of the day, has felt a little nauseated - almost a motion sickness type sensation.  No recent infections or URI concerns.    Worse in the morning and towards the evening when tired.      Has  "a trip planned to [place] and the [place] next week and is worried about the travel.    Has been resting and cutting back on her strenuous exercise to help with her symptoms.      Problem list and histories reviewed & adjusted, as indicated.  Additional history: as documented      Reviewed and updated as needed this visit by clinical staff     Reviewed and updated as needed this visit by Provider         ROS:  Constitutional, HEENT, gi, neuro, msk systems are negative, except as otherwise noted.      OBJECTIVE:   /70 (BP Location: Right arm, Patient Position: Right side, Cuff Size: Adult Regular)  Pulse 78  Temp 98.7  F (37.1  C) (Tympanic)  Ht 4' 10.5\" (1.486 m)  Wt 128 lb (58.1 kg)  SpO2 100%  BMI 26.3 kg/m2  Body mass index is 26.3 kg/(m^2).  GENERAL: healthy, alert and no distress  EYES: Eyes grossly normal to inspection, PERRL and conjunctivae and sclerae normal.  EOMI, no nystagmus  HENT: ear canals and TM's normal, nose and mouth without ulcers or lesions  RESP: lungs clear to auscultation - no rales, rhonchi or wheezes  CV: regular rate and rhythm, normal S1 S2, no S3 or S4, no murmur, click or rub, no peripheral edema and peripheral pulses strong  NEURO: Normal strength and tone, sensory exam grossly normal, mentation intact, oriented times 3, speech normal, DTR's normal and symmetric, Romberg normal and rapid alternating movements normal  PSYCH: mentation appears normal, affect normal/bright    Diagnostic Test Results:  none     ASSESSMENT/PLAN:       ICD-10-CM    1. Dizziness R42 NEUROLOGY ADULT REFERRAL     meclizine (ANTIVERT) 25 MG tablet    Concern that this represents an exacerbation of past concussion symptoms after a severe concussion last year.   She will be traveling soon - plan meclizine, rest, avoidance of strenuous triggering activities and neurology referral for further investigation and input.   2. History of concussion Z87.820 NEUROLOGY ADULT REFERRAL       See Patient " Instructions    Nyasia Wise MD  [facility]

## 2017-12-21 NOTE — MR AVS SNAPSHOT
After Visit Summary   12/21/2017    Swetha Flores    MRN: [id number]           Patient Information     Date Of Birth          1970        Visit Information        Provider Department      12/21/2017 2:40 PM Nyasia Wise MD [facility]        Today's Diagnoses     Dizziness    -  1    History of concussion          Care Instructions    Use meclizine as needed for dizziness/motion sickness    Stay well hydrated!    Call for visit with neurology - # below          Follow-ups after your visit        Additional Services     NEUROLOGY ADULT REFERRAL       Your provider has referred you for the following:   Consult at [facility]: [facility] [phone]   [email or web address]    Please be aware that coverage of these services is subject to the terms and limitations of your health insurance plan.  Call member services at your health plan with any benefit or coverage questions.      Please bring the following with you to your appointment:    (1) Any X-Rays, CTs or MRIs which have been performed.  Contact the facility where they were done to arrange for  prior to your scheduled appointment.    (2) List of current medications  (3) This referral request   (4) Any documents/labs given to you for this referral                  Who to contact     If you have questions or need follow up information about today's clinic visit or your schedule please contact [facility] directly at [phone].  Normal or non-critical lab and imaging results will be communicated to you by MyChart, letter or phone within 4 business days after the clinic has received the results. If you do not hear from us within 7 days, please contact the clinic through MyChart or phone. If you have a critical or abnormal lab result, we will notify you by phone as soon as possible.  Submit refill requests through [employer] or call your pharmacy and they will  "forward the refill request to us. Please allow 3 business days for your refill to be completed.          Additional Information About Your Visit        [employer] Information     [employer] gives you secure access to your electronic health record. If you see a primary care provider, you can also send messages to your care team and make appointments. If you have questions, please call your primary care clinic.  If you do not have a primary care provider, please call [phone] and they will assist you.        Care EveryWhere ID     This is your Care EveryWhere ID. This could be used by other organizations to access your [facility] medical records  [id number]        Your Vitals Were     Pulse Temperature Height Pulse Oximetry BMI (Body Mass Index)       78 98.7  F (37.1  C) (Tympanic) 4' 10.5\" (1.486 m) 100% 26.3 kg/m2        Blood Pressure from Last 3 Encounters:   12/21/17 106/70   09/14/17 92/60   02/09/17 102/70    Weight from Last 3 Encounters:   12/21/17 128 lb (58.1 kg)   09/14/17 124 lb 1 oz (56.3 kg)   02/09/17 124 lb (56.2 kg)              We Performed the Following     NEUROLOGY ADULT REFERRAL          Today's Medication Changes          These changes are accurate as of: 12/21/17  3:17 PM.  If you have any questions, ask your nurse or doctor.               Start taking these medicines.        Dose/Directions    meclizine 25 MG tablet   Commonly known as:  ANTIVERT   Used for:  Dizziness   Started by:  Nyasia Wise MD        Dose:  25 mg   Take 1 tablet (25 mg) by mouth 3 times daily as needed for dizziness   Quantity:  30 tablet   Refills:  1            Where to get your medicines      These medications were sent to [id number] [place] - [address] AT [address]  [facility], [place] [address]     Phone:  [phone]     meclizine 25 MG tablet                Primary Care Provider Office Phone # Fax #    Nyasia Wise [phone] " [phone]       [address] [place] [address]        Equal Access to Services     [facility] : Hadii aad ku haddipikajaja Mcintyre, wajoseda essenceadaha, qaybta renekikatrini simmons, jerman dinhsandraroz rey. So [facility] [phone].    ATENCIÓN: Si habla español, tiene a crouch disposición servicios gratuitos de asistencia lingüística. Llame al [phone].    We comply with applicable federal civil rights laws and [place] laws. We do not discriminate on the basis of race, color, national origin, age, disability, sex, sexual orientation, or gender identity.            Thank you!     Thank you for choosing [facility] [place]  for your care. Our goal is always to provide you with excellent care. Hearing back from our patients is one way we can continue to improve our services. Please take a few minutes to complete the written survey that you may receive in the mail after your visit with us. Thank you!             Your Updated Medication List - Protect others around you: Learn how to safely use, store and throw away your medicines at www.disposemymeds.org.          This list is accurate as of: 12/21/17  3:17 PM.  Always use your most recent med list.                   Brand Name Dispense Instructions for use Diagnosis    levothyroxine 112 MCG tablet    SYNTHROID/LEVOTHROID    90 tablet    Take 1 tablet (112 mcg) by mouth daily    Hypothyroidism, unspecified type       meclizine 25 MG tablet    ANTIVERT    30 tablet    Take 1 tablet (25 mg) by mouth 3 times daily as needed for dizziness    Dizziness       WOMENS MULTI VITAMIN & MINERAL PO      Take  by mouth.    Unspecified hypothyroidism

## 2018-02-01 ENCOUNTER — OFFICE VISIT (OUTPATIENT)
Dept: NEUROLOGY | Facility: CLINIC | Age: 48
End: 2018-02-01
Payer: COMMERCIAL

## 2018-02-01 VITALS
WEIGHT: 127.4 LBS | BODY MASS INDEX: 25.68 KG/M2 | TEMPERATURE: 98.1 F | OXYGEN SATURATION: 99 % | HEIGHT: 59 IN | RESPIRATION RATE: 16 BRPM | HEART RATE: 80 BPM | SYSTOLIC BLOOD PRESSURE: 110 MMHG | DIASTOLIC BLOOD PRESSURE: 74 MMHG

## 2018-02-01 DIAGNOSIS — R42 DIZZINESS: Primary | ICD-10-CM

## 2018-02-01 PROCEDURE — 99205 OFFICE O/P NEW HI 60 MIN: CPT | Performed by: PSYCHIATRY & NEUROLOGY

## 2018-02-01 NOTE — NURSING NOTE
COGNITIVE SCREEN  1) Repeat 3 items (Banana, Sunrise, Chair)    2) Clock draw: NORMAL  3) 3 item recall: Recalls 3 objects  Results: 3 items recalled: COGNITIVE IMPAIRMENT LESS LIKELY    Mini-CogTM Copyright S Sasha. Licensed by the author for use in [facility]; reprinted with permission ([email or web address]). All rights reserved.      Ofelia Bosch [facility]

## 2018-02-01 NOTE — LETTER
"    2/1/2018         RE: Swetha Flores  [address] [place] [address]        Dear Colleague,    Thank you for referring your patient, Swetha Flores, to the [facility]. Please see a copy of my visit note below.    INITIAL NEUROLOGY CONSULTATION    DATE OF VISIT: 2/1/2018  CLINIC LOCATION: [facility]  MRN: [id number]  PATIENT NAME: Swetha Flores  YOB: 1970    PRIMARY CARE PROVIDER: Nyasia Wise MD.    REASON FOR VISIT:   Chief Complaint   Patient presents with     Consult     concussion symptoms \"brain ache, fatigue, insomnia, headache)     HISTORY OF PRESENT ILLNESS:                                                    Ms. Swetha Flores is 47 year old right handed female patient with history of hypothyroidism, hyperlipidemia, anxiety, vitamin D deficiency, and concussion, who was seen in consultation today requested by Nyasia Wise MD, for symptoms, reminiscent of previous concussion, predominantly intermittent dizziness.    Per patient's report, in January 2017 the patient fell on ice hitting the right side of her head without associated loss of consciousness.  She experienced moderate right-sided headache, severe vertigo, nausea, vomiting, difficulty concentrating, and fatigue.  Went to ED.  Her head CT was unrevealing.  Symptoms completely resolved within the next 4-5 weeks.    However, milder symptoms returned on 12/17/2017 in context of more intense yoga exercises that required a lot of bending down so that her head was significantly lower than her waist.  Initially, the symptoms were mild and rare.  Then, they became more frequent in later December leading to the evaluation at her primary care office.  She went on a trip to [facility] and felt fine during that time.  However, upon returning home, even the short 1-2 mile walk, triggered her symptoms prompting her to be more sedentary.    Last Sunday, she went for a walk with the family, and since " "last Monday experiences more persistent symptoms.    Currently, the patient reports mild \"vertigo\"/dizziness that she describes as a sensation of \"feeling off balance\", \"no rocking or spinning\".  Denies headache, but has \"difficult to describe brain ache\" along with slight nausea, difficulty concentrating, fatigue, and disturbed sleep (wakes up around 3 AM and has hard time falling back asleep).  Reports elevated stress level.  Her maternal grandmother recently , and the patient changed her job recently, that requires a lot of studying.  Feels that her \"IQ dropped a few points\".  Nobody else at work noticed that.  Symptoms are more noticeable in the morning and later in the day.  Any exercise, even a 1 mile walk, make her symptoms worse.  Resting with eyes closed make her symptoms better.  She did not try any other specific treatments.    Recent laboratory evaluation (2017) includes low TSH of 0.15 with elevated T4 at 1.5 (her Synthroid dose was lowered after that), normal vitamin B12 (401) and vitamin D (27), elevated LDL at 127, normal CMP.    Previous head CT from 2017 demonstrated small benign-appearing calcification in the left frontal lobe without evidence of acute intracranial pathology, including hemorrhage, acute infarct, or any focal mass lesions.  No additional pertinent information is available in Care Everywhere, which was reviewed.    The patient denies a history of recent head injury. Prior neurological history: negative for migraine, stroke, brain neoplasms, seizure disorders, multiple sclerosis, meningitis, encephalitis, and major head injuries.    Neurologic Review of Systems - no amaurosis, diplopia, abnormal speech, unilateral numbness or weakness. She endorses fatigue and thyroid problems.  These complaints have been already discussed with other medical providers.  Otherwise, she denies any other complaints on 14-point comprehensive review of systems.    PAST MEDICAL/SURGICAL " HISTORY:                                                    I personally reviewed patient's past medical and surgical history with the patient at today's visit.  Past Medical History:   Diagnosis Date     Ankle pain 2009     Anxiety 2011     Arthropathy, unspecified, site unspecified     has been to PT without much relief.     Cervicalgia 2007     Concussion 2017     Hyperlipidemia LDL goal <160 10/31/2010     JOINT PAIN-LOWER LEG 3/21/2007     JOINT PAIN-PELVIS 2008     Unspecified hypothyroidism      Past Surgical History:   Procedure Laterality Date     C NONSPECIFIC PROCEDURE  ,     2 NSVDs      MEDICATIONS:                                                    I personally reviewed patient's medications and allergies with the patient at today's visit.  Current Outpatient Prescriptions on File Prior to Visit:  levothyroxine 112 MCG tablet Take 1 tablet (112 mcg) by mouth daily   Multiple Vitamins-Minerals  Take  by mouth.   meclizine (ANTIVERT) 25 MG tablet Patient not taking: Reported on 2018.     ALLERGIES:                                                      Allergies   Allergen Reactions     Nkda [No Known Drug Allergies]      Seasonal Allergies      Barley Grass Rash     FAMILY/SOCIAL HISTORY:                                                    Family and social history was reviewed with the patient at today's visit.  Family history is positive for migraine (brother) and dementia (maternal grandmother, recently ).   Problem (# of Occurrences) Relation (Name,Age of Onset)    Arthritis (1) Father    Cardiovascular (1) Father    Depression (1) Mother    Lipids (1) Mother    Thyroid Disease (1) Mother       Negative family history of: Breast Cancer        , lives with family (, son and daughter).  Never smoker.  Denies use of alcohol and recreational drugs.  Works full-time as  for the last 15 years.  Social History   Substance Use Topics     Smoking  "status: Never Smoker     Smokeless tobacco: Never Used     Alcohol use No     REVIEW OF SYSTEMS:                                                    Patient has completed a [facility] Patient Health History, including a 14-system review, which was personally reviewed, and pertinent positives are listed in HPI. She denies any additional problems on the further questioning.    EXAM:                                                    VITAL SIGNS:   /74 (BP Location: Right arm, Patient Position: Sitting, Cuff Size: Adult Regular)  Pulse 80  Temp 98.1  F (36.7  C) (Oral)  Resp 16  Ht 1.486 m (4' 10.5\")  Wt 57.8 kg (127 lb 6.4 oz)  SpO2 99%  BMI 26.17 kg/m2    General: pt is in NAD, cooperative.  Skin: normal turgor, moist mucous membranes, no lesions/rashes noticed.  HEENT: ATNC, EOMI, PERRL, white sclera, normal conjunctiva, no nystagmus or ptosis. No carotid bruits bilaterally.  Respiratory: lung sounds clear to auscultation bilaterally, no crackles, wheezes, rhonchi. Symmetric lung excursion, no accessory respiratory muscle use.  Cardiovascular: normal S1/S2, no murmurs/rubs/gallops.   Abdomen: Not distended.  : deferred.    Neurological:  Mental: alert, follows commands, mini-cog is 5/5 with 3/3 on memory recall, no aphasia or dysarthria. Fund of knowledge is appropriate for age.  Cranial Nerves:  CN II: visual acuity - able to accurately count fingers with each eye. Visual fields intact, fundi: discs sharp, no papilledema and normal vessels bilaterally.  CN III, IV, VI: EOM intact, pupils equal and reactive.  No spontaneous nystagmus, no skew deviation.  CN V: facial sensation nl  CN VII: face symmetric, no facial droop  CN VIII: hearing normal.  Head impulse test is negative bilaterally.  [place]-Hallpike maneuvers reproduce patient's symptoms bilaterally; however left beating nystagmus with rotatory component is only seen while testing the left side.  CN IX: palate elevation symmetric, uvula " at midline  CN XI SCM normal, shoulder shrug nl  CN XII: tongue midline  Motor: Strength: 5/5 in all major groups of all extremities. Normal tone. No abnormal movements. No pronator drift b/l.  Reflexes: Triceps, biceps, brachioradialis, patellar, and achilles reflexes normal and symmetric. No clonus noted. Toes are down-going b/l.   Sensory: temperature, light touch, pinprick, and vibration intact. Romberg: negative.  Coordination: FNF and heel-shin tests intact b/l. No dysdiadochokinesia with rapid alternating movements.  Gait:  Normal, able to tandem, toe, and heel walk.    DATA:     LABS: I personally reviewed the following labs:  Office Visit on 09/14/2017   Component Date Value Ref Range Status     TSH 09/14/2017 0.15* 0.40 - 4.00 mU/L Final     Sodium 09/14/2017 139  133 - 144 mmol/L Final     Potassium 09/14/2017 4.0  3.4 - 5.3 mmol/L Final     Chloride 09/14/2017 107  94 - 109 mmol/L Final     Carbon Dioxide 09/14/2017 25  20 - 32 mmol/L Final     Anion Gap 09/14/2017 7  3 - 14 mmol/L Final     Glucose 09/14/2017 82  70 - 99 mg/dL Final     Urea Nitrogen 09/14/2017 9  7 - 30 mg/dL Final     Creatinine 09/14/2017 0.64  0.52 - 1.04 mg/dL Final     GFR Estimate 09/14/2017 >90  >60 mL/min/1.7m2 Final    Non  GFR Calc     GFR Estimate If Black 09/14/2017 >90  >60 mL/min/1.7m2 Final    African American GFR Calc     Calcium 09/14/2017 9.2  8.5 - 10.1 mg/dL Final     Bilirubin Total 09/14/2017 0.7  0.2 - 1.3 mg/dL Final     Albumin 09/14/2017 3.8  3.4 - 5.0 g/dL Final     Protein Total 09/14/2017 7.5  6.8 - 8.8 g/dL Final     Alkaline Phosphatase 09/14/2017 50  40 - 150 U/L Final     ALT 09/14/2017 21  0 - 50 U/L Final     AST 09/14/2017 11  0 - 45 U/L Final     Cholesterol 09/14/2017 206* <200 mg/dL Final    Desirable:       <200 mg/dl     Triglycerides 09/14/2017 68  <150 mg/dL Final     HDL Cholesterol 09/14/2017 65  >49 mg/dL Final     LDL Cholesterol Calculated 09/14/2017 127* <100 mg/dL Final     Comment: Above desirable:  100-129 mg/dl  Borderline High:  130-159 mg/dL  High:             160-189 mg/dL  Very high:       >189 mg/dl       Non HDL Cholesterol 09/14/2017 141* <130 mg/dL Final    Comment: Above Desirable:  130-159 mg/dl  Borderline high:  160-189 mg/dl  High:             190-219 mg/dl  Very high:       >219 mg/dl       Vitamin D Deficiency screening 09/14/2017 27  20 - 75 ug/L Final    Comment: Season, race, dietary intake, and treatment affect the concentration of   25-hydroxy-Vitamin D. Values may decrease during winter months and increase   during summer months. Values 20-29 ug/L may indicate Vitamin D insufficiency   and values <20 ug/L may indicate Vitamin D deficiency.  Vitamin D determination is routinely performed by an immunoassay specific for   25 hydroxyvitamin D3.  If an individual is on vitamin D2 (ergocalciferol)   supplementation, please specify 25 OH vitamin D2 and D3 level determination by   LCMSMS test [id number].       Vitamin B12 09/14/2017 401  193 - 986 pg/mL Final     T4 Free 09/14/2017 1.50* 0.76 - 1.46 ng/dL Final     IMAGING/OTHER STUDIES: I reviewed pertinent medical records, including personal review of head CT images and Care Everywhere, as detailed in the history of present illness.    ASSESSMENT and PLAN:      ASSESSMENT: Swetha Flores is a 47 year old female patient with history of hypothyroidism, hyperlipidemia, anxiety, vitamin D deficiency, and concussion, who presents with intermittent positional dizziness started in December 2017 in context of more intense yoga exercises that required the patient to bend down (head below the waist).    We had a prolonged discussion with the patient and her  regarding patient's symptoms.  Her neurological exam today points towards peripheral vestibular dysfunction, potentially bilateral, but more evident on the left side.  It is possible that she had an episode of BPPV versus labyrinthine concussion with her initial head  injury in January 2017 with current episode triggered by strenuous yoga exercises/bending down causing the recurrence of symptoms.  I ordered physical therapy for trial of Epley maneuvers to see if they help.    Her initial head CT was unrevealing.  At the present time, I do not feel that we need to repeat brain imaging.  However, if her symptoms persist despite physical therapy, brain MRI with and without contrast might be considered as a part of her further evaluation for alternative causes, including vestibular schwannoma.    The rest of our detailed discussion and the plan are summarized below.    DIAGNOSES:    ICD-10-CM    1. Dizziness R42 PHYSICAL THERAPY REFERRAL     PLAN: At today's visit we thoroughly discussed various diagnostic possibilities for patient's symptoms and the plan, which includes:  Orders Placed This Encounter   Procedures     PHYSICAL THERAPY REFERRAL     I counseled the patient to come back for an additional evaluation if her symptoms do not improve.    No new medications were ordered.    Next follow-up appointment is on as needed basis.    I advised the patient to call me with any questions or concerns.    Total Time:  62 minutes with > 50% spent counseling the patient and her  on stated above assessment and recommendations, including nature of the diagnosis, possible future w/u, proposed plan of treatment, and prognosis.  Additional time was used to numerous questions that they had.    Justin Reyes MD  / Neurology  [place]  (Chart documentation was completed in part with Dragon voice-recognition software. Even though reviewed, some grammatical, spelling, and word errors may remain.)              Again, thank you for allowing me to participate in the care of your patient.        Sincerely,        Justin Reyes MD

## 2018-02-01 NOTE — PROGRESS NOTES
"INITIAL NEUROLOGY CONSULTATION    DATE OF VISIT: 2/1/2018  CLINIC LOCATION: [facility]  MRN: [id number]  PATIENT NAME: Swetha Flores  YOB: 1970    PRIMARY CARE PROVIDER: Nyasia Wise MD.    REASON FOR VISIT:   Chief Complaint   Patient presents with     Consult     concussion symptoms \"brain ache, fatigue, insomnia, headache)     HISTORY OF PRESENT ILLNESS:                                                    Ms. Swetha Flores is 47 year old right handed female patient with history of hypothyroidism, hyperlipidemia, anxiety, vitamin D deficiency, and concussion, who was seen in consultation today requested by Nyasia Wise MD, for symptoms, reminiscent of previous concussion, predominantly intermittent dizziness.    Per patient's report, in January 2017 the patient fell on ice hitting the right side of her head without associated loss of consciousness.  She experienced moderate right-sided headache, severe vertigo, nausea, vomiting, difficulty concentrating, and fatigue.  Went to ED.  Her head CT was unrevealing.  Symptoms completely resolved within the next 4-5 weeks.    However, milder symptoms returned on 12/17/2017 in context of more intense yoga exercises that required a lot of bending down so that her head was significantly lower than her waist.  Initially, the symptoms were mild and rare.  Then, they became more frequent in later December leading to the evaluation at her primary care office.  She went on a trip to [facility] and felt fine during that time.  However, upon returning home, even the short 1-2 mile walk, triggered her symptoms prompting her to be more sedentary.    Last Sunday, she went for a walk with the family, and since last Monday experiences more persistent symptoms.    Currently, the patient reports mild \"vertigo\"/dizziness that she describes as a sensation of \"feeling off balance\", \"no rocking or spinning\".  Denies headache, but has \"difficult to " "describe brain ache\" along with slight nausea, difficulty concentrating, fatigue, and disturbed sleep (wakes up around 3 AM and has hard time falling back asleep).  Reports elevated stress level.  Her maternal grandmother recently , and the patient changed her job recently, that requires a lot of studying.  Feels that her \"IQ dropped a few points\".  Nobody else at work noticed that.  Symptoms are more noticeable in the morning and later in the day.  Any exercise, even a 1 mile walk, make her symptoms worse.  Resting with eyes closed make her symptoms better.  She did not try any other specific treatments.    Recent laboratory evaluation (2017) includes low TSH of 0.15 with elevated T4 at 1.5 (her Synthroid dose was lowered after that), normal vitamin B12 (401) and vitamin D (27), elevated LDL at 127, normal CMP.    Previous head CT from 2017 demonstrated small benign-appearing calcification in the left frontal lobe without evidence of acute intracranial pathology, including hemorrhage, acute infarct, or any focal mass lesions.  No additional pertinent information is available in Care Everywhere, which was reviewed.    The patient denies a history of recent head injury. Prior neurological history: negative for migraine, stroke, brain neoplasms, seizure disorders, multiple sclerosis, meningitis, encephalitis, and major head injuries.    Neurologic Review of Systems - no amaurosis, diplopia, abnormal speech, unilateral numbness or weakness. She endorses fatigue and thyroid problems.  These complaints have been already discussed with other medical providers.  Otherwise, she denies any other complaints on 14-point comprehensive review of systems.    PAST MEDICAL/SURGICAL HISTORY:                                                    I personally reviewed patient's past medical and surgical history with the patient at today's visit.  Past Medical History:   Diagnosis Date     Ankle pain 2009     Anxiety " 2011     Arthropathy, unspecified, site unspecified     has been to PT without much relief.     Cervicalgia 2007     Concussion 2017     Hyperlipidemia LDL goal <160 10/31/2010     JOINT PAIN-LOWER LEG 3/21/2007     JOINT PAIN-PELVIS 2008     Unspecified hypothyroidism      Past Surgical History:   Procedure Laterality Date     C NONSPECIFIC PROCEDURE  ,     2 NSVDs      MEDICATIONS:                                                    I personally reviewed patient's medications and allergies with the patient at today's visit.  Current Outpatient Prescriptions on File Prior to Visit:  levothyroxine 112 MCG tablet Take 1 tablet (112 mcg) by mouth daily   Multiple Vitamins-Minerals  Take  by mouth.   meclizine (ANTIVERT) 25 MG tablet Patient not taking: Reported on 2018.     ALLERGIES:                                                      Allergies   Allergen Reactions     Nkda [No Known Drug Allergies]      Seasonal Allergies      Barley Grass Rash     FAMILY/SOCIAL HISTORY:                                                    Family and social history was reviewed with the patient at today's visit.  Family history is positive for migraine (brother) and dementia (maternal grandmother, recently ).   Problem (# of Occurrences) Relation (Name,Age of Onset)    Arthritis (1) Father    Cardiovascular (1) Father    Depression (1) Mother    Lipids (1) Mother    Thyroid Disease (1) Mother       Negative family history of: Breast Cancer        , lives with family (, son and daughter).  Never smoker.  Denies use of alcohol and recreational drugs.  Works full-time as  for the last 15 years.  Social History   Substance Use Topics     Smoking status: Never Smoker     Smokeless tobacco: Never Used     Alcohol use No     REVIEW OF SYSTEMS:                                                    Patient has completed a [facility] Patient Health History, including a  "14-system review, which was personally reviewed, and pertinent positives are listed in HPI. She denies any additional problems on the further questioning.    EXAM:                                                    VITAL SIGNS:   /74 (BP Location: Right arm, Patient Position: Sitting, Cuff Size: Adult Regular)  Pulse 80  Temp 98.1  F (36.7  C) (Oral)  Resp 16  Ht 1.486 m (4' 10.5\")  Wt 57.8 kg (127 lb 6.4 oz)  SpO2 99%  BMI 26.17 kg/m2    General: pt is in NAD, cooperative.  Skin: normal turgor, moist mucous membranes, no lesions/rashes noticed.  HEENT: ATNC, EOMI, PERRL, white sclera, normal conjunctiva, no nystagmus or ptosis. No carotid bruits bilaterally.  Respiratory: lung sounds clear to auscultation bilaterally, no crackles, wheezes, rhonchi. Symmetric lung excursion, no accessory respiratory muscle use.  Cardiovascular: normal S1/S2, no murmurs/rubs/gallops.   Abdomen: Not distended.  : deferred.    Neurological:  Mental: alert, follows commands, mini-cog is 5/5 with 3/3 on memory recall, no aphasia or dysarthria. Fund of knowledge is appropriate for age.  Cranial Nerves:  CN II: visual acuity - able to accurately count fingers with each eye. Visual fields intact, fundi: discs sharp, no papilledema and normal vessels bilaterally.  CN III, IV, VI: EOM intact, pupils equal and reactive.  No spontaneous nystagmus, no skew deviation.  CN V: facial sensation nl  CN VII: face symmetric, no facial droop  CN VIII: hearing normal.  Head impulse test is negative bilaterally.  [place]-Hallpike maneuvers reproduce patient's symptoms bilaterally; however left beating nystagmus with rotatory component is only seen while testing the left side.  CN IX: palate elevation symmetric, uvula at midline  CN XI SCM normal, shoulder shrug nl  CN XII: tongue midline  Motor: Strength: 5/5 in all major groups of all extremities. Normal tone. No abnormal movements. No pronator drift b/l.  Reflexes: Triceps, biceps, " brachioradialis, patellar, and achilles reflexes normal and symmetric. No clonus noted. Toes are down-going b/l.   Sensory: temperature, light touch, pinprick, and vibration intact. Romberg: negative.  Coordination: FNF and heel-shin tests intact b/l. No dysdiadochokinesia with rapid alternating movements.  Gait:  Normal, able to tandem, toe, and heel walk.    DATA:     LABS: I personally reviewed the following labs:  Office Visit on 09/14/2017   Component Date Value Ref Range Status     TSH 09/14/2017 0.15* 0.40 - 4.00 mU/L Final     Sodium 09/14/2017 139  133 - 144 mmol/L Final     Potassium 09/14/2017 4.0  3.4 - 5.3 mmol/L Final     Chloride 09/14/2017 107  94 - 109 mmol/L Final     Carbon Dioxide 09/14/2017 25  20 - 32 mmol/L Final     Anion Gap 09/14/2017 7  3 - 14 mmol/L Final     Glucose 09/14/2017 82  70 - 99 mg/dL Final     Urea Nitrogen 09/14/2017 9  7 - 30 mg/dL Final     Creatinine 09/14/2017 0.64  0.52 - 1.04 mg/dL Final     GFR Estimate 09/14/2017 >90  >60 mL/min/1.7m2 Final    Non  GFR Calc     GFR Estimate If Black 09/14/2017 >90  >60 mL/min/1.7m2 Final    African American GFR Calc     Calcium 09/14/2017 9.2  8.5 - 10.1 mg/dL Final     Bilirubin Total 09/14/2017 0.7  0.2 - 1.3 mg/dL Final     Albumin 09/14/2017 3.8  3.4 - 5.0 g/dL Final     Protein Total 09/14/2017 7.5  6.8 - 8.8 g/dL Final     Alkaline Phosphatase 09/14/2017 50  40 - 150 U/L Final     ALT 09/14/2017 21  0 - 50 U/L Final     AST 09/14/2017 11  0 - 45 U/L Final     Cholesterol 09/14/2017 206* <200 mg/dL Final    Desirable:       <200 mg/dl     Triglycerides 09/14/2017 68  <150 mg/dL Final     HDL Cholesterol 09/14/2017 65  >49 mg/dL Final     LDL Cholesterol Calculated 09/14/2017 127* <100 mg/dL Final    Comment: Above desirable:  100-129 mg/dl  Borderline High:  130-159 mg/dL  High:             160-189 mg/dL  Very high:       >189 mg/dl       Non HDL Cholesterol 09/14/2017 141* <130 mg/dL Final    Comment: Above  Desirable:  130-159 mg/dl  Borderline high:  160-189 mg/dl  High:             190-219 mg/dl  Very high:       >219 mg/dl       Vitamin D Deficiency screening 09/14/2017 27  20 - 75 ug/L Final    Comment: Season, race, dietary intake, and treatment affect the concentration of   25-hydroxy-Vitamin D. Values may decrease during winter months and increase   during summer months. Values 20-29 ug/L may indicate Vitamin D insufficiency   and values <20 ug/L may indicate Vitamin D deficiency.  Vitamin D determination is routinely performed by an immunoassay specific for   25 hydroxyvitamin D3.  If an individual is on vitamin D2 (ergocalciferol)   supplementation, please specify 25 OH vitamin D2 and D3 level determination by   LCMSMS test [id number].       Vitamin B12 09/14/2017 401  193 - 986 pg/mL Final     T4 Free 09/14/2017 1.50* 0.76 - 1.46 ng/dL Final     IMAGING/OTHER STUDIES: I reviewed pertinent medical records, including personal review of head CT images and Care Everywhere, as detailed in the history of present illness.    ASSESSMENT and PLAN:      ASSESSMENT: Swetha Flores is a 47 year old female patient with history of hypothyroidism, hyperlipidemia, anxiety, vitamin D deficiency, and concussion, who presents with intermittent positional dizziness started in December 2017 in context of more intense yoga exercises that required the patient to bend down (head below the waist).    We had a prolonged discussion with the patient and her  regarding patient's symptoms.  Her neurological exam today points towards peripheral vestibular dysfunction, potentially bilateral, but more evident on the left side.  It is possible that she had an episode of BPPV versus labyrinthine concussion with her initial head injury in January 2017 with current episode triggered by strenuous yoga exercises/bending down causing the recurrence of symptoms.  I ordered physical therapy for trial of Epley maneuvers to see if they help.    Her  initial head CT was unrevealing.  At the present time, I do not feel that we need to repeat brain imaging.  However, if her symptoms persist despite physical therapy, brain MRI with and without contrast might be considered as a part of her further evaluation for alternative causes, including vestibular schwannoma.    The rest of our detailed discussion and the plan are summarized below.    DIAGNOSES:    ICD-10-CM    1. Dizziness R42 PHYSICAL THERAPY REFERRAL     PLAN: At today's visit we thoroughly discussed various diagnostic possibilities for patient's symptoms and the plan, which includes:  Orders Placed This Encounter   Procedures     PHYSICAL THERAPY REFERRAL     I counseled the patient to come back for an additional evaluation if her symptoms do not improve.    No new medications were ordered.    Next follow-up appointment is on as needed basis.    I advised the patient to call me with any questions or concerns.    Total Time:  62 minutes with > 50% spent counseling the patient and her  on stated above assessment and recommendations, including nature of the diagnosis, possible future w/u, proposed plan of treatment, and prognosis.  Additional time was used to numerous questions that they had.    Justin Reyes MD  / Neurology  [place]  (Chart documentation was completed in part with Dragon voice-recognition software. Even though reviewed, some grammatical, spelling, and word errors may remain.)

## 2018-02-01 NOTE — PATIENT INSTRUCTIONS
AFTER VISIT SUMMARY (AVS):    At today's visit we discussed various diagnostic possibilities for your symptoms and the plan, which includes:  Orders Placed This Encounter   Procedures     PHYSICAL THERAPY REFERRAL     Please come back for an additional evaluation if your symptoms do not improve.    No new medications were ordered.    Next follow-up appointment is on as needed basis.    Please do not hesitate to call me with any questions or concerns.    Thanks.

## 2018-02-01 NOTE — MR AVS SNAPSHOT
After Visit Summary   2/1/2018    Swetha Flores    MRN: [id number]           Patient Information     Date Of Birth          1970        Visit Information        Provider Department      2/1/2018 1:30 PM Justin Reyes MD [facility]        Today's Diagnoses     Dizziness    -  1      Care Instructions    AFTER VISIT SUMMARY (AVS):    At today's visit we discussed various diagnostic possibilities for your symptoms and the reasons for work-up, which includes:  Orders Placed This Encounter   Procedures     PHYSICAL THERAPY REFERRAL     Please come back if your symptoms do not improve for an additional evaluation.    No new medications were ordered.    Next follow-up appointment is on as needed basis.    Please do not hesitate to call me with any questions or concerns.    Thanks.            Follow-ups after your visit        Additional Services     PHYSICAL THERAPY REFERRAL       *This therapy referral will be filtered to a centralized scheduling office at [facility] and the patient will receive a call to schedule an appointment at a [facility] location most convenient for them. *     [facility] provides Physical Therapy evaluation and treatment and many specialty services across the [facility] system.  If requesting a specialty program, please choose from the list below.    If you have not heard from the scheduling office within 2 business days, please call [phone] for all locations, with the exception of [place], please call [phone].  Treatment: Evaluation & Treatment  Special Instructions/Modalities: BPPV and peripheral vestibular dysfunction  Special Programs: Balance/Vestibular    Please be aware that coverage of these services is subject to the terms and limitations of your health insurance plan.  Call member services at your health plan with any benefit or coverage questions.      **Note to Provider:  If you are  "referring outside of [facility] for the therapy appointment, please list the name of the location in the \"special instructions\" above, print the referral and give to the patient to schedule the appointment.                  Follow-up notes from your care team     Return if symptoms worsen or fail to improve.      Who to contact     If you have questions or need follow up information about today's clinic visit or your schedule please contact [facility] directly at [phone].  Normal or non-critical lab and imaging results will be communicated to you by [facility], letter or phone within 4 business days after the clinic has received the results. If you do not hear from us within 7 days, please contact the clinic through [facility] or phone. If you have a critical or abnormal lab result, we will notify you by phone as soon as possible.  Submit refill requests through [employer] [facility] or call your pharmacy and they will forward the refill request to us. Please allow 3 business days for your refill to be completed.          Additional Information About Your Visit        [facility] Information     [facility] gives you secure access to your electronic health record. If you see a primary care provider, you can also send messages to your care team and make appointments. If you have questions, please call your primary care clinic.  If you do not have a primary care provider, please call [phone] and they will assist you.        Care EveryWhere ID     This is your Care EveryWhere ID. This could be used by other organizations to access your [facility] medical records  [id number]        Your Vitals Were     Pulse Temperature Respirations Height Pulse Oximetry BMI (Body Mass Index)    80 98.1  F (36.7  C) (Oral) 16 1.486 m (4' 10.5\") 99% 26.17 kg/m2       Blood Pressure from Last 3 Encounters:   02/01/18 110/74   12/21/17 106/70   09/14/17 92/60    Weight from Last 3 Encounters:   02/01/18 57.8 kg (127 lb 6.4 oz)   12/21/17 " 58.1 kg (128 lb)   09/14/17 56.3 kg (124 lb 1 oz)              We Performed the Following     PHYSICAL THERAPY REFERRAL        Primary Care Provider Office Phone # Fax #    Nyasia Wise [phone] [address] [place] [address]        Equal Access to Services     [facility]: Hadii aad ku hadasho Soomaali, waaxda luqadaha, qaybta kaalmada adeegyada, waxay idiin hayaan adeeg williamsh la'aan . So [facility] [phone].    ATENCIÓN: Si habla español, tiene a crouch disposición servicios gratuitos de asistencia lingüística. Llame al [phone].    We comply with applicable federal civil rights laws and [place] laws. We do not discriminate on the basis of race, color, national origin, age, disability, sex, sexual orientation, or gender identity.            Thank you!     Thank you for choosing [facility]  for your care. Our goal is always to provide you with excellent care. Hearing back from our patients is one way we can continue to improve our services. Please take a few minutes to complete the written survey that you may receive in the mail after your visit with us. Thank you!             Your Updated Medication List - Protect others around you: Learn how to safely use, store and throw away your medicines at www.disposemymeds.org.          This list is accurate as of 2/1/18  2:35 PM.  Always use your most recent med list.                   Brand Name Dispense Instructions for use Diagnosis    levothyroxine 112 MCG tablet    SYNTHROID/LEVOTHROID    90 tablet    Take 1 tablet (112 mcg) by mouth daily    Hypothyroidism, unspecified type       meclizine 25 MG tablet    ANTIVERT    30 tablet    Take 1 tablet (25 mg) by mouth 3 times daily as needed for dizziness    Dizziness       WOMENS MULTI VITAMIN & MINERAL PO      Take  by mouth.    Unspecified hypothyroidism

## 2018-02-05 ENCOUNTER — HOSPITAL ENCOUNTER (OUTPATIENT)
Dept: PHYSICAL THERAPY | Facility: CLINIC | Age: 48
Setting detail: THERAPIES SERIES
End: 2018-02-05
Attending: PSYCHIATRY & NEUROLOGY
Payer: COMMERCIAL

## 2018-02-05 PROCEDURE — 95992 CANALITH REPOSITIONING PROC: CPT | Mod: GP | Performed by: PHYSICAL THERAPIST

## 2018-02-05 PROCEDURE — 97162 PT EVAL MOD COMPLEX 30 MIN: CPT | Mod: GP | Performed by: PHYSICAL THERAPIST

## 2018-02-05 PROCEDURE — 40000840 ZZHC STATISTIC PT VESTIBULAR VISIT: Performed by: PHYSICAL THERAPIST

## 2018-02-13 ENCOUNTER — HOSPITAL ENCOUNTER (OUTPATIENT)
Dept: PHYSICAL THERAPY | Facility: CLINIC | Age: 48
Setting detail: THERAPIES SERIES
End: 2018-02-13
Attending: PSYCHIATRY & NEUROLOGY
Payer: COMMERCIAL

## 2018-02-13 PROCEDURE — 97110 THERAPEUTIC EXERCISES: CPT | Mod: GP | Performed by: PHYSICAL THERAPIST

## 2018-02-13 PROCEDURE — 95992 CANALITH REPOSITIONING PROC: CPT | Mod: GP | Performed by: PHYSICAL THERAPIST

## 2018-02-13 PROCEDURE — 40000840 ZZHC STATISTIC PT VESTIBULAR VISIT: Performed by: PHYSICAL THERAPIST

## 2018-02-13 NOTE — ADDENDUM NOTE
Encounter addended by: Chantel Rivas PT on: 2/13/2018  9:29 AM<BR>     Actions taken: Flowsheet accepted

## 2018-02-14 NOTE — ADDENDUM NOTE
Encounter addended by: Chantel Rivas PT on: 2/14/2018  3:34 PM<BR>     Actions taken: Flowsheet accepted

## 2018-02-20 ENCOUNTER — HOSPITAL ENCOUNTER (OUTPATIENT)
Dept: PHYSICAL THERAPY | Facility: CLINIC | Age: 48
Setting detail: THERAPIES SERIES
End: 2018-02-20
Attending: PSYCHIATRY & NEUROLOGY
Payer: COMMERCIAL

## 2018-02-20 PROCEDURE — 97112 NEUROMUSCULAR REEDUCATION: CPT | Mod: GP | Performed by: PHYSICAL THERAPIST

## 2018-02-20 PROCEDURE — 40000840 ZZHC STATISTIC PT VESTIBULAR VISIT: Performed by: PHYSICAL THERAPIST

## 2018-02-20 PROCEDURE — 95992 CANALITH REPOSITIONING PROC: CPT | Mod: GP | Performed by: PHYSICAL THERAPIST

## 2018-02-20 NOTE — ADDENDUM NOTE
Encounter addended by: Chantel Rivas PT on: 2/19/2018  9:31 PM<BR>     Actions taken: Flowsheet accepted

## 2018-02-21 NOTE — ADDENDUM NOTE
Encounter addended by: Chantel Rivas, PT on: 2/21/2018  9:15 AM<BR>     Actions taken: Flowsheet data copied forward, Flowsheet accepted

## 2018-02-21 NOTE — ADDENDUM NOTE
Encounter addended by: Chantel Rivas PT on: 2/21/2018  8:53 AM<BR>     Actions taken: Sign clinical note, Flowsheet accepted

## 2018-02-21 NOTE — PROGRESS NOTES
" 02/05/18 1500   Quick Adds   Quick Adds Vestibular Eval   Type of Visit Initial OP PT Evaluation   General Information   Start of Care Date 02/05/18   Referring Physician Justin Reyes MD   Orders Evaluate and Treat as Indicated   Order Date 02/01/18   Medical Diagnosis Dizziness R42  - Primary    Onset of illness/injury or Date of Surgery 12/17/17   Precautions/Limitations no known precautions/limitations   Special Instructions BPPV and peripheral vestibular dysfunction   Surgical/Medical history reviewed Yes   Pertinent history of current vestibular problem (include personal factors and/or comorbidities that impact the POC)  Prior concussion(s)  (1/2017 - fell on ice hitting R side of head)   Pertinent Visual History  Pt s/p concussion about 11 months ago. Inital consussive sxs: vertigo, nausea, vomiting, difficulty concetrating and faituge. Per chart she f/u with PT for eval only but did not continue with therapy with sxs resolving within 4-5 weeks. She returned to active lifestyle, including international travel. Reports return of sxs reminiscent of consusession, described predominantly as intermittent dizziness. Onset of dizziness on 12/17/17 while bending over in yoga. She had just started participating in yoga exercise. Symptoms are mild and rare, but became more frequent late December leading to f/u with neurologist 4 days ago. Neurologist recomended f/u with PT susspected BPPV. Reports exercising inc her sxs, notable after walking 1 mile. Reports sxs of off balance, nausea, HA come in attacks after  prolonged walking. Reports sxs following walk are delayed by a day or two, no sxs during walk. Last attack about 1 week ago. Currently \"just doesnt feel right\" with some slight nausea and off balance feeling, need to focus on balance. Pt reports her sxs resolved folowing concussion but sxs of dizziness when turing head to R in bed have remained consistant. Sxs also recur with turning, and " bending over.  Also reports signifciant hx of R sided neck stiffness and avoids turning head L. More cautious navigaiting stairs. Avoiding yoga. Difficulty concentraing at work. Wakes up at night with slight HA but doesnt wake up dizzy. Driving is okay, needs to concentrate. Hx of motion sensitivity in car. No hx of migraines. No changes to R sided neck stifness since return of dizziness. Reading doesn't bother her.    Prior level of function comment Independent mobility. Used to participate in yoga classes and walking for exercise.    Patient role/Employment history Employed  (, continued full-time)   Home/Community Accessibility Comments Lives in 2 story home wiht her . Has 2 flights of stairs within home with railing. Reports currently more cautious navigiating stairs.   Assistive Devices Comments No AD   Patient/Family Goals Statement resolve dizziness, return to prior walking, sleep and concetration   Fall Risk Screen   Fall screen completed by PT   Have you fallen 2 or more times in the past year? No   Have you fallen and had an injury in the past year? Yes   Timed Up and Go score (seconds) n/t (see 4 item DGI)   Is patient a fall risk? No   Fall screen comments 1 fall with concussion 1/2017.   System Outcome Measures   Outcome Measures BPPV   Dizziness Handicap Inventory (score out of 100) A decrease in score by 17.18 or greater indicates a clinically significant change in symptoms. 34   Pain   Patient currently in pain No   Pain comments reports signifciant hx of R sided neck stiffness and avoids turning head L   Cognitive Status Examination   Orientation orientation to person, place and time   Level of Consciousness alert   Follows Commands and Answers Questions 100% of the time;able to follow multistep instructions   Personal Safety and Judgment intact   Memory intact   Posture   Posture Forward head position;Protracted shoulders   Strength   Strength Comments Functional asessment  "as observed with transfers and gait, considered WFL   Bed Mobility   Bed Mobility Comments independent   Transfer Skills   Transfer Comments independent without use of UE with good stability   Gait   Gait Comments reciprocal gait pattern, no veering or instability   Gait Special Tests   Gait Special Tests 25 FOOT TIMED WALK;DYNAMIC GAIT INDEX   Gait Special Tests 25 Foot Timed Walk   Seconds 6.51   Comments WNL   Gait Special Tests Dynamic Gait Index   Comments 4 item DGI: 11/12, mild veering with head turns   Balance Special Tests   Balance Special Tests Modified CTSIB Conditions   Balance Special Tests Modified CTSIB Conditions   Condition 1, seconds 30 Seconds   Condition 2, seconds 30 Seconds   Condition 4, seconds 30 Seconds   Condition 5, seconds 30 Seconds   Modified CTSIB Comments WNL with inc vestibular utilization   Cervicogenic Screen   Neck ROM grossly WNL, sufficient for positional testing   Oculomotor Exam   Smooth Pursuit Normal   Saccades Normal   VOR Comments requests therapist stop assessmen d/t sxs nausea  \"4-4.5/6\"   Rapid Head Thrust Normal   Convergence Testing Normal   Infrared Goggle Exam or Frenzel Lense Exam   Vestibular Suppressant in Last 24 Hours? No   Exam completed with Infrared Goggles   Spontaneous Nystagmus Negative   Gaze Evoked Nystagmus Negative   Head Shake Horizontal Nystagmus Negative   Gillett-Hallpike (right) Negative   Gillett-Hallpike (Left) Upbeating L torsional   Gillett-Hallpike (left) comments long duration nystmagus that subsides, short duration vertigo; return to sitting: short duration vertigo, no nystagmus   BPPV Canal(s) L Posterior   BPPV Type Cupulolithasis   Dynamic Visual Acuity (DVA)   DVA Comments unable to complete d/t request to stop from sxs of dizziness/nausea   Planned Therapy Interventions   Planned Therapy Interventions neuromuscular re-education;other (see comments);ROM;strengthening;stretching   Planned Therapy Interventions Comment CRM   Clinical Impression "   Criteria for Skilled Therapeutic Interventions Met yes, treatment indicated   PT Diagnosis (+) s/s of BPPV with positional testing   Influenced by the following impairments (+) s/s of BPPV with positional testing, prior concussion with sxs resolved excluding dizziness when turing head to R in bed; imparied VOR per DVA assessment unable to tolerate d/t sxs of nausea; chronic R sided neck stiffness and avoding turning head L   Functional limitations due to impairments participation in exercise, bed mobility, walking/stair navigaition   Clinical Presentation Evolving/Changing   Clinical Decision Making (Complexity) Moderate complexity   Therapy Frequency 1 time/week   Predicted Duration of Therapy Intervention (days/wks) 6 weeks   Risk & Benefits of therapy have been explained Yes   Patient, Family & other staff in agreement with plan of care Yes   Clinical Impression Comments Pt would benefit from continued skilled therapy to address sxs of BPPV, following resolution of BPPV, should pt have continued sxs of dizziness limiting participation in activities above plan for continued therapy followed by recommendation to f/u with ENT or return to neurologist should sxs remain unresolved.   Education Assessment   Barriers to Learning No barriers   GOALS   PT Eval Goals 1;2;3;4   Goal 1   Goal Identifier BPPV   Goal Description Pt will demonstrate (-) s/s of BPPV throughout positional testing of all canals and no longer report dizziness with turning in bed.   Target Date 03/20/18   Goal 2   Goal Identifier DHI (baseline: 34/100)   Goal Description Pt to score 0/100 per DHI with resolved sxs of dizziness >/= 24-28 hours.    Target Date 03/20/18   Goal 3   Goal Identifier Exercise (baseline: avoiding walking, yoga d/t dizziness)   Goal Description Participating in prefered exercise (ie walking, yoga) without sxs of dizziness.   Target Date 03/20/18   Goal 4   Goal Identifier VOR (baseline: requests therapist stop assessmen  "d/t sxs nausea  \"4-4.5/6\")   Goal Description Pt to demonstrate improved VOR per DVA assessment to WNL with 1-2 line difference.    Target Date 03/20/18   Total Evaluation Time   Total Evaluation Time (Minutes) 46     "

## 2018-02-26 ENCOUNTER — HOSPITAL ENCOUNTER (OUTPATIENT)
Dept: PHYSICAL THERAPY | Facility: CLINIC | Age: 48
Setting detail: THERAPIES SERIES
End: 2018-02-26
Attending: PSYCHIATRY & NEUROLOGY
Payer: COMMERCIAL

## 2018-02-26 PROCEDURE — 40000840 ZZHC STATISTIC PT VESTIBULAR VISIT: Performed by: PHYSICAL THERAPIST

## 2018-02-26 PROCEDURE — 95992 CANALITH REPOSITIONING PROC: CPT | Mod: GP | Performed by: PHYSICAL THERAPIST

## 2018-03-01 ENCOUNTER — HOSPITAL ENCOUNTER (OUTPATIENT)
Dept: ULTRASOUND IMAGING | Facility: CLINIC | Age: 48
End: 2018-03-01
Attending: PEDIATRICS
Payer: COMMERCIAL

## 2018-03-01 ENCOUNTER — HOSPITAL ENCOUNTER (OUTPATIENT)
Dept: MAMMOGRAPHY | Facility: CLINIC | Age: 48
Discharge: HOME | End: 2018-03-01
Attending: PEDIATRICS | Admitting: PEDIATRICS
Payer: COMMERCIAL

## 2018-03-01 DIAGNOSIS — Z12.39 SCREENING FOR BREAST CANCER: ICD-10-CM

## 2018-03-01 DIAGNOSIS — N63.20 LEFT BREAST LUMP: ICD-10-CM

## 2018-03-01 PROCEDURE — 76641 ULTRASOUND BREAST COMPLETE: CPT | Mod: 50

## 2018-03-01 PROCEDURE — G0279 TOMOSYNTHESIS, MAMMO: HCPCS

## 2018-03-02 ENCOUNTER — MYC MEDICAL ADVICE (OUTPATIENT)
Dept: PEDIATRICS | Facility: CLINIC | Age: 48
End: 2018-03-02

## 2018-03-06 ENCOUNTER — MYC MEDICAL ADVICE (OUTPATIENT)
Dept: PEDIATRICS | Facility: CLINIC | Age: 48
End: 2018-03-06

## 2018-03-06 DIAGNOSIS — N60.02 BREAST CYST, LEFT: Primary | ICD-10-CM

## 2018-03-12 ENCOUNTER — TELEPHONE (OUTPATIENT)
Dept: MAMMOGRAPHY | Facility: CLINIC | Age: 48
End: 2018-03-12

## 2018-03-12 NOTE — TELEPHONE ENCOUNTER
Patient is scheduled for biopsy 3-16-18. She is wanting to talk through procedure.  I have given her pre biopsy teaching and informed her we will go over it again before procedure, and that she will have a chance to ask questions.

## 2018-03-16 ENCOUNTER — HOSPITAL ENCOUNTER (OUTPATIENT)
Dept: ULTRASOUND IMAGING | Facility: CLINIC | Age: 48
Discharge: HOME | End: 2018-03-16
Attending: PEDIATRICS | Admitting: PEDIATRICS
Payer: COMMERCIAL

## 2018-03-16 DIAGNOSIS — N60.02 BREAST CYST, LEFT: ICD-10-CM

## 2018-03-16 PROCEDURE — 27210206 US BREAST BIOPSY CORE NEEDLE LEFT

## 2018-03-16 PROCEDURE — 88305 TISSUE EXAM BY PATHOLOGIST: CPT | Performed by: PEDIATRICS

## 2018-03-16 PROCEDURE — 88305 TISSUE EXAM BY PATHOLOGIST: CPT | Mod: 26 | Performed by: PEDIATRICS

## 2018-03-16 NOTE — DISCHARGE INSTRUCTIONS
Page 1 of 1  For informational purposes only. Not to replace the advice of your health care provider. Copyright   2010 [facility]. All rights reserved. [place] [id number] - REV 02/16.  After Your Breast Biopsy   Bleeding or bruising  Slight bruising is normal. If you bleed through the bandage, put direct pressure on the breast for 10 minutes.   If the breast begins to swell, or you have a lot of bleeding after 10 minutes of pressure, call the doctor who ordered your exam. Or, go to the emergency room.   Bandages  Keep your bandage in place until tomorrow morning. Do not get it wet.   If you have small pieces of tape on the skin, leave them in place. They will fall off on their own, or you can remove them after 5 days.   Activity  You may shower the morning after the exam. No heavy activity (lifting, vacuuming) on the day of your exam. You may go back to normal activity the next day, unless you had a lot of bleeding or pain.  Discomfort  You may take Tylenol (acetaminophen) today for pain. Tomorrow, you may take an anti-inflammatory medicine (aspirin, ibuprofen, Motrin, Aleve, Advil), unless your doctor tells you not to.  Wear your bra overnight to support the breast. You may also use an ice pack: Place it over the area for 15-20 minutes several times a day.  Infection  Infection is rare. Symptoms include fever, redness, increasing pain and fluid draining from the biopsy site. If you have any of these symptoms, please call the doctor who ordered your exam.  Results  Results may take up to 5 business days. A nurse or doctor from the [facility] will call with your results. We will also send the results to the doctor who ordered your biopsy.  If you have not heard your results in 5 days, please call the Breast Center.   Other instructions  ______________________________________________________________________________________________________________________________  Call your doctor if:    You have  bleeding that lasts more than 10 minutes.    You have pain that cannot be controlled.   You have signs of infection (fever, redness, drainage or other signs).   You have not received your results within 5 days.    Please call the [facility] nurse navigator at [phone] if you have questions or concerns about your biopsy.

## 2018-03-19 LAB — COPATH REPORT: NORMAL

## 2018-03-20 ENCOUNTER — TELEPHONE (OUTPATIENT)
Dept: MAMMOGRAPHY | Facility: CLINIC | Age: 48
End: 2018-03-20

## 2018-03-22 ENCOUNTER — HOSPITAL ENCOUNTER (OUTPATIENT)
Dept: PHYSICAL THERAPY | Facility: CLINIC | Age: 48
Setting detail: THERAPIES SERIES
End: 2018-03-22
Attending: PSYCHIATRY & NEUROLOGY
Payer: COMMERCIAL

## 2018-03-22 PROCEDURE — 95992 CANALITH REPOSITIONING PROC: CPT | Mod: GP | Performed by: PHYSICAL THERAPIST

## 2018-03-22 PROCEDURE — 40000840 ZZHC STATISTIC PT VESTIBULAR VISIT: Performed by: PHYSICAL THERAPIST

## 2018-04-11 ENCOUNTER — HOSPITAL ENCOUNTER (OUTPATIENT)
Dept: PHYSICAL THERAPY | Facility: CLINIC | Age: 48
Setting detail: THERAPIES SERIES
End: 2018-04-11
Attending: PSYCHIATRY & NEUROLOGY
Payer: COMMERCIAL

## 2018-04-11 PROCEDURE — 40000840 ZZHC STATISTIC PT VESTIBULAR VISIT: Performed by: PHYSICAL THERAPIST

## 2018-04-11 PROCEDURE — 97112 NEUROMUSCULAR REEDUCATION: CPT | Mod: GP | Performed by: PHYSICAL THERAPIST

## 2018-06-04 DIAGNOSIS — A69.20 LYME DISEASE: Primary | ICD-10-CM

## 2018-06-04 RX ORDER — DOXYCYCLINE HYCLATE 100 MG
200 TABLET ORAL 2 TIMES DAILY
Qty: 2 TABLET | Refills: 0 | Status: SHIPPED | OUTPATIENT
Start: 2018-06-04 | End: 2018-10-09

## 2018-08-25 DIAGNOSIS — E55.9 VITAMIN D DEFICIENCY: Primary | ICD-10-CM

## 2018-08-25 DIAGNOSIS — Z13.220 ENCOUNTER FOR LIPID SCREENING FOR CARDIOVASCULAR DISEASE: ICD-10-CM

## 2018-08-25 DIAGNOSIS — Z13.6 ENCOUNTER FOR LIPID SCREENING FOR CARDIOVASCULAR DISEASE: ICD-10-CM

## 2018-08-25 DIAGNOSIS — Z78.9 VEGAN DIET: ICD-10-CM

## 2018-08-25 DIAGNOSIS — E03.9 HYPOTHYROIDISM, UNSPECIFIED TYPE: ICD-10-CM

## 2018-08-25 NOTE — TELEPHONE ENCOUNTER
"Requested Prescriptions   Pending Prescriptions Disp Refills     SYNTHROID 112 MCG tablet [Pharmacy Med Name: SYNTHROID 0.112MG (112MCG) TABLETS]  Last Written Prescription Date:  09/15/2017  Last Fill Quantity: 90 tablet,  # refills: 3   Last office visit: 12/21/2017 with prescribing provider:  Nyasia Wise MD    Future Office Visit:     90 tablet 0     Sig: TAKE 1 TABLET BY MOUTH EVERY DAY    Thyroid Protocol Failed    8/25/2018  9:07 AM       Failed - Normal TSH on file in past 12 months    Recent Labs   Lab Test  09/14/17   0921   TSH  0.15*             Passed - Patient is 12 years or older       Passed - Recent (12 mo) or future (30 days) visit within the authorizing provider's specialty    Patient had office visit in the last 12 months or has a visit in the next 30 days with authorizing provider or within the authorizing provider's specialty.  See \"Patient Info\" tab in inbasket, or \"Choose Columns\" in Meds & Orders section of the refill encounter.           Passed - No active pregnancy on record    If patient is pregnant or has had a positive pregnancy test, please check TSH.         Passed - No positive pregnancy test in past 12 months    If patient is pregnant or has had a positive pregnancy test, please check TSH.            "

## 2018-08-28 RX ORDER — LEVOTHYROXINE SODIUM 112 MCG
TABLET ORAL
Qty: 30 TABLET | Refills: 0 | Status: SHIPPED | OUTPATIENT
Start: 2018-08-28 | End: 2018-09-24

## 2018-08-28 NOTE — TELEPHONE ENCOUNTER
30 day supply given.  Patient is due for OV.  Please notify patient and assist in scheduling.    PCP: Please forward this message to the appropriate team to assist in notifying patient.  Thank you,  Mindy Pradhan RN - Triage  [facility]

## 2018-08-28 NOTE — TELEPHONE ENCOUNTER
Forwarded to station for scheduling.  May need additional supply prior to open availability and that is ok with me.      Nyasia Wise MD  Internal Medicine - Pediatrics

## 2018-08-30 NOTE — TELEPHONE ENCOUNTER
Type of outreach:  Phone, left message for patient to call back.   Health Maintenance Due   Topic Date Due     HIV SCREEN (SYSTEM ASSIGNED)  11/22/1990     TSH Q1 YEAR  09/14/2018     PHQ-2 Q1 YR  09/14/2018     Danitza Kimbrough MA

## 2018-09-06 NOTE — TELEPHONE ENCOUNTER
Type of outreach:  Phone, left message for patient to call back.   Health Maintenance Due   Topic Date Due     HIV SCREEN (SYSTEM ASSIGNED)  11/22/1990     INFLUENZA VACCINE (1) 09/01/2018     TSH Q1 YEAR  09/14/2018     PHQ-2 Q1 YR  09/14/2018     Danitza Kimbrough MA

## 2018-09-07 NOTE — TELEPHONE ENCOUNTER
Patient returned call, scheduled lab only on 9/28/18 (wants annual lab orders and thyroid lab orders placed for this appointment, will be fasting). Scheduled physical with PCP on 10/9/18.  -Xiomara Blanco

## 2018-09-13 NOTE — TELEPHONE ENCOUNTER
Type of outreach:  Pt has appt scheduled.   Health Maintenance Due   Topic Date Due     HIV SCREEN (SYSTEM ASSIGNED)  11/22/1990     INFLUENZA VACCINE (1) 09/01/2018     TSH Q1 YEAR  09/14/2018     PHQ-2 Q1 YR  09/14/2018       Danitza Kimbrough MA

## 2018-09-24 ENCOUNTER — MYC MEDICAL ADVICE (OUTPATIENT)
Dept: PEDIATRICS | Facility: CLINIC | Age: 48
End: 2018-09-24

## 2018-09-24 DIAGNOSIS — E03.9 HYPOTHYROIDISM, UNSPECIFIED TYPE: ICD-10-CM

## 2018-09-24 RX ORDER — LEVOTHYROXINE SODIUM 112 MCG
112 TABLET ORAL DAILY
Qty: 10 TABLET | Refills: 0 | Status: SHIPPED | OUTPATIENT
Start: 2018-09-24 | End: 2018-09-30

## 2018-09-24 NOTE — TELEPHONE ENCOUNTER
Synthroid 112 mcg  Last Written Prescription Date:  08/28/18  Last Fill Quantity: 30,  # refills: 0   Last office visit: 12/21/2017 with prescribing provider:  12/21/17   Future Office Visit:   Next 5 appointments (look out 90 days)     Oct 09, 2018  3:00 PM CDT   PHYSICAL with Nyasia Wise MD   [facility]    [address] [place] [id number] [phone]                 Requested Prescriptions   Pending Prescriptions Disp Refills     SYNTHROID 112 MCG tablet 10 tablet 0     Sig: Take 1 tablet (112 mcg) by mouth daily    There is no refill protocol information for this order        TSH   Date Value Ref Range Status   09/14/2017 0.15 (L) 0.40 - 4.00 mU/L Final   Medication is being filled for 10 days onlyPatient needs labs and office appt.   Appointments are scheduled, but patient will run out of medication prior to appointment.  AFSHIN Ballesteros RN

## 2018-09-28 DIAGNOSIS — Z13.220 ENCOUNTER FOR LIPID SCREENING FOR CARDIOVASCULAR DISEASE: ICD-10-CM

## 2018-09-28 DIAGNOSIS — Z78.9 VEGAN DIET: ICD-10-CM

## 2018-09-28 DIAGNOSIS — E03.9 HYPOTHYROIDISM, UNSPECIFIED TYPE: ICD-10-CM

## 2018-09-28 DIAGNOSIS — E55.9 VITAMIN D DEFICIENCY: ICD-10-CM

## 2018-09-28 DIAGNOSIS — Z13.6 ENCOUNTER FOR LIPID SCREENING FOR CARDIOVASCULAR DISEASE: ICD-10-CM

## 2018-09-28 LAB
ALBUMIN SERPL-MCNC: 3.8 G/DL (ref 3.4–5)
ALP SERPL-CCNC: 52 U/L (ref 40–150)
ALT SERPL W P-5'-P-CCNC: 25 U/L (ref 0–50)
ANION GAP SERPL CALCULATED.3IONS-SCNC: 6 MMOL/L (ref 3–14)
AST SERPL W P-5'-P-CCNC: 14 U/L (ref 0–45)
BILIRUB SERPL-MCNC: 0.4 MG/DL (ref 0.2–1.3)
BUN SERPL-MCNC: 8 MG/DL (ref 7–30)
CALCIUM SERPL-MCNC: 8.9 MG/DL (ref 8.5–10.1)
CHLORIDE SERPL-SCNC: 108 MMOL/L (ref 94–109)
CHOLEST SERPL-MCNC: 206 MG/DL
CO2 SERPL-SCNC: 25 MMOL/L (ref 20–32)
CREAT SERPL-MCNC: 0.68 MG/DL (ref 0.52–1.04)
ERYTHROCYTE [DISTWIDTH] IN BLOOD BY AUTOMATED COUNT: 12.8 % (ref 10–15)
GFR SERPL CREATININE-BSD FRML MDRD: >90 ML/MIN/1.7M2
GLUCOSE SERPL-MCNC: 81 MG/DL (ref 70–99)
HCT VFR BLD AUTO: 38.3 % (ref 35–47)
HDLC SERPL-MCNC: 60 MG/DL
HGB BLD-MCNC: 13.1 G/DL (ref 11.7–15.7)
LDLC SERPL CALC-MCNC: 127 MG/DL
MCH RBC QN AUTO: 29.5 PG (ref 26.5–33)
MCHC RBC AUTO-ENTMCNC: 34.2 G/DL (ref 31.5–36.5)
MCV RBC AUTO: 86 FL (ref 78–100)
NONHDLC SERPL-MCNC: 146 MG/DL
PLATELET # BLD AUTO: 198 10E9/L (ref 150–450)
POTASSIUM SERPL-SCNC: 4.1 MMOL/L (ref 3.4–5.3)
PROT SERPL-MCNC: 7.4 G/DL (ref 6.8–8.8)
RBC # BLD AUTO: 4.44 10E12/L (ref 3.8–5.2)
SODIUM SERPL-SCNC: 139 MMOL/L (ref 133–144)
T4 FREE SERPL-MCNC: 1.23 NG/DL (ref 0.76–1.46)
TRIGL SERPL-MCNC: 93 MG/DL
TSH SERPL DL<=0.005 MIU/L-ACNC: 0.3 MU/L (ref 0.4–4)
VIT B12 SERPL-MCNC: 384 PG/ML (ref 193–986)
WBC # BLD AUTO: 5.2 10E9/L (ref 4–11)

## 2018-09-28 PROCEDURE — 82607 VITAMIN B-12: CPT | Performed by: PEDIATRICS

## 2018-09-28 PROCEDURE — 36415 COLL VENOUS BLD VENIPUNCTURE: CPT | Performed by: PEDIATRICS

## 2018-09-28 PROCEDURE — 85027 COMPLETE CBC AUTOMATED: CPT | Performed by: PEDIATRICS

## 2018-09-28 PROCEDURE — 84439 ASSAY OF FREE THYROXINE: CPT | Performed by: PEDIATRICS

## 2018-09-28 PROCEDURE — 82306 VITAMIN D 25 HYDROXY: CPT | Performed by: PEDIATRICS

## 2018-09-28 PROCEDURE — 84443 ASSAY THYROID STIM HORMONE: CPT | Performed by: PEDIATRICS

## 2018-09-28 PROCEDURE — 80053 COMPREHEN METABOLIC PANEL: CPT | Performed by: PEDIATRICS

## 2018-09-28 PROCEDURE — 80061 LIPID PANEL: CPT | Performed by: PEDIATRICS

## 2018-09-29 LAB — DEPRECATED CALCIDIOL+CALCIFEROL SERPL-MC: 26 UG/L (ref 20–75)

## 2018-09-30 RX ORDER — LEVOTHYROXINE SODIUM 112 MCG
112 TABLET ORAL DAILY
Qty: 90 TABLET | Refills: 3 | Status: SHIPPED | OUTPATIENT
Start: 2018-09-30 | End: 2020-06-22

## 2018-10-09 ENCOUNTER — OFFICE VISIT (OUTPATIENT)
Dept: PEDIATRICS | Facility: CLINIC | Age: 48
End: 2018-10-09
Payer: COMMERCIAL

## 2018-10-09 VITALS
DIASTOLIC BLOOD PRESSURE: 66 MMHG | WEIGHT: 128 LBS | HEART RATE: 75 BPM | TEMPERATURE: 99.2 F | OXYGEN SATURATION: 100 % | BODY MASS INDEX: 25.8 KG/M2 | HEIGHT: 59 IN | SYSTOLIC BLOOD PRESSURE: 100 MMHG

## 2018-10-09 DIAGNOSIS — N60.01 BILATERAL BREAST CYSTS: ICD-10-CM

## 2018-10-09 DIAGNOSIS — N60.02 BILATERAL BREAST CYSTS: ICD-10-CM

## 2018-10-09 DIAGNOSIS — L40.9 PSORIASIS: ICD-10-CM

## 2018-10-09 DIAGNOSIS — Z00.00 ROUTINE GENERAL MEDICAL EXAMINATION AT A HEALTH CARE FACILITY: Primary | ICD-10-CM

## 2018-10-09 DIAGNOSIS — E55.9 VITAMIN D DEFICIENCY: ICD-10-CM

## 2018-10-09 DIAGNOSIS — E03.9 HYPOTHYROIDISM, UNSPECIFIED TYPE: ICD-10-CM

## 2018-10-09 PROCEDURE — 99396 PREV VISIT EST AGE 40-64: CPT | Performed by: PEDIATRICS

## 2018-10-09 ASSESSMENT — ENCOUNTER SYMPTOMS
MYALGIAS: 0
JOINT SWELLING: 0
DIZZINESS: 0
PALPITATIONS: 0
WEAKNESS: 0
DIARRHEA: 0
BREAST MASS: 1
SHORTNESS OF BREATH: 0
DYSURIA: 0
HEARTBURN: 0
ARTHRALGIAS: 0
FREQUENCY: 0
SORE THROAT: 0
HEMATURIA: 0
PARESTHESIAS: 0
ABDOMINAL PAIN: 0
HEADACHES: 0
NAUSEA: 0
EYE PAIN: 0
CONSTIPATION: 0
NERVOUS/ANXIOUS: 0
CHILLS: 0
HEMATOCHEZIA: 0
COUGH: 0
FEVER: 0

## 2018-10-09 NOTE — MR AVS SNAPSHOT
After Visit Summary   10/9/2018    Swetha Flores    MRN: [id number]           Patient Information     Date Of Birth          1970        Visit Information        Provider Department      10/9/2018 3:00 PM Nyasia Wise MD [facility]        Today's Diagnoses     Routine general medical examination at a health care facility    -  1      Care Instructions    Keep me updated about the cyst in your left breast - alert me if not decreasing in size and we can pursue breast ultrasound, otherwise we will plan on mammogram in March    Repeat your thyroid testing in about 6 weeks      Preventive Health Recommendations  Female Ages 40 to 49    Yearly exam:     See your health care provider every year in order to  1. Review health changes.   2. Discuss preventive care.    3. Review your medicines if your doctor prescribed any.      Get a Pap test every three years (unless you have an abnormal result and your provider advises testing more often).      If you get Pap tests with HPV test, you only need to test every 5 years, unless you have an abnormal result. You do not need a Pap test if your uterus was removed (hysterectomy) and you have not had cancer.      You should be tested each year for STDs (sexually transmitted diseases), if you're at risk.     Ask your doctor if you should have a mammogram.      Have a colonoscopy (test for colon cancer) if someone in your family has had colon cancer or polyps before age 50.       Have a cholesterol test every 5 years.       Have a diabetes test (fasting glucose) after age 45. If you are at risk for diabetes, you should have this test every 3 years.    Shots: Get a flu shot each year. Get a tetanus shot every 10 years.     Nutrition:     Eat at least 5 servings of fruits and vegetables each day.    Eat whole-grain bread, whole-wheat pasta and brown rice instead of white grains and rice.    Get adequate Calcium and Vitamin D.      Lifestyle    Exercise  "at least 150 minutes a week (an average of 30 minutes a day, 5 days a week). This will help you control your weight and prevent disease.    Limit alcohol to one drink per day.    No smoking.     Wear sunscreen to prevent skin cancer.    See your dentist every six months for an exam and cleaning.          Follow-ups after your visit        Follow-up notes from your care team     Return in about 1 year (around 10/9/2019) for Routine Visit.      Who to contact     If you have questions or need follow up information about today's clinic visit or your schedule please contact [facility] directly at [phone].  Normal or non-critical lab and imaging results will be communicated to you by MyChart, letter or phone within 4 business days after the clinic has received the results. If you do not hear from us within 7 days, please contact the clinic through [employer] or phone. If you have a critical or abnormal lab result, we will notify you by phone as soon as possible.  Submit refill requests through [employer] or call your pharmacy and they will forward the refill request to us. Please allow 3 business days for your refill to be completed.          Additional Information About Your Visit        [employer] [employer] Information     [employer] gives you secure access to your electronic health record. If you see a primary care provider, you can also send messages to your care team and make appointments. If you have questions, please call your primary care clinic.  If you do not have a primary care provider, please call [phone] and they will assist you.        Care EveryWhere ID     This is your Care EveryWhere ID. This could be used by other organizations to access your [facility] medical records  [id number]        Your Vitals Were     Pulse Temperature Height Last Period Pulse Oximetry BMI (Body Mass Index)    75 99.2  F (37.3  C) (Tympanic) 4' 10.5\" (1.486 m) 10/08/2018 (Exact Date) 100% 26.3 kg/m2       Blood Pressure from " Last 3 Encounters:   10/09/18 100/66   02/01/18 110/74   12/21/17 106/70    Weight from Last 3 Encounters:   10/09/18 128 lb (58.1 kg)   02/01/18 127 lb 6.4 oz (57.8 kg)   12/21/17 128 lb (58.1 kg)              Today, you had the following     No orders found for display       Primary Care Provider Office Phone # Fax #    yNasia Wise [phone] [address] [place] [address]        Equal Access to Services     [facility]: Hadii solomon navarro hadasho Somorales, waaxda luqadaha, qaybta kaalmada troy, jerman harp . So [facility] [phone].    ATENCIÓN: Si habla español, tiene a crouch disposición servicios gratuitos de asistencia lingüística. [facility] [phone].    We comply with applicable federal civil rights laws and [place] laws. We do not discriminate on the basis of race, color, national origin, age, disability, sex, sexual orientation, or gender identity.            Thank you!     Thank you for choosing [facility]  for your care. Our goal is always to provide you with excellent care. Hearing back from our patients is one way we can continue to improve our services. Please take a few minutes to complete the written survey that you may receive in the mail after your visit with us. Thank you!             Your Updated Medication List - Protect others around you: Learn how to safely use, store and throw away your medicines at www.disposemymeds.org.          This list is accurate as of 10/9/18  3:50 PM.  Always use your most recent med list.                   Brand Name Dispense Instructions for use Diagnosis    SYNTHROID 112 MCG tablet   Generic drug:  levothyroxine     90 tablet    Take 1 tablet (112 mcg) by mouth daily    Hypothyroidism, unspecified type       WOMENS MULTI VITAMIN & MINERAL PO      Take  by mouth.    Unspecified hypothyroidism

## 2018-10-09 NOTE — PROGRESS NOTES
SUBJECTIVE:   CC: Swetha Flores is an 47 year old woman who presents for preventive health visit.     Physical   Annual:     Getting at least 3 servings of Calcium per day:  Yes    Bi-annual eye exam:  Yes    Dental care twice a year:  Yes    Sleep apnea or symptoms of sleep apnea:  None    Diet:  Vegetarian/vegan    Frequency of exercise:  2-3 days/week    Duration of exercise:  30-45 minutes    Taking medications regularly:  Yes    Medication side effects:  None    Additional concerns today:  YES (dermatology update)      Today's PHQ-2 Score:   PHQ-2 ( 1999 [employer]) 10/9/2018   Q1: Little interest or pleasure in doing things 0   Q2: Feeling down, depressed or hopeless 0   PHQ-2 Score 0   Q1: Little interest or pleasure in doing things Not at all   Q2: Feeling down, depressed or hopeless Not at all   PHQ-2 Score 0       Abuse: Current or Past(Physical, Sexual or Emotional)- No  Do you feel safe in your environment - Yes    Social History   Substance Use Topics     Smoking status: Never Smoker     Smokeless tobacco: Never Used     Alcohol use No     Alcohol Use 10/9/2018   If you drink alcohol do you typically have greater than 3 drinks per day OR greater than 7 drinks per week? Not Applicable   No flowsheet data found.    Reviewed orders with patient.  Reviewed health maintenance and updated orders accordingly - Yes    Patient under age 50, mutual decision reflected in health maintenance.      Pertinent mammograms are reviewed under the imaging tab.  History of abnormal Pap smear: NO - age 30-65 PAP every 5 years with negative HPV co-testing recommended  PAP / HPV Latest Ref Rng & Units 3/16/2015 5/29/2012 2/24/2009   PAP - NIL NIL NIL   HPV 16 DNA NEG Negative - -   HPV 18 DNA NEG Negative - -   OTHER HR HPV NEG Negative - -     Reviewed and updated as needed this visit by clinical staff  Tobacco  Allergies  Meds  Med Hx  Surg Hx  Fam Hx  Soc Hx        Reviewed and updated as needed this visit by Provider       "  Dermatology - years of rash posterior scalp and neck - diagnosed with psoriasis.  Wondering about complementary treatments in place of recurrent topical steroid.    Breast cysts - has history of numerous breast cysts with multiple diagnostic imaging studies and past biopsy that was benign.  More recently, has had a large cyst on the left outer breast border that was red in overlying skin - now improved in size and redness reduced    Review of Systems   Constitutional: Negative for chills and fever.   HENT: Negative for congestion, ear pain, hearing loss and sore throat.    Eyes: Negative for pain and visual disturbance.   Respiratory: Negative for cough and shortness of breath.    Cardiovascular: Negative for chest pain, palpitations and peripheral edema.   Gastrointestinal: Negative for abdominal pain, constipation, diarrhea, heartburn, hematochezia and nausea.   Breasts:  Positive for breast mass. Negative for tenderness and discharge.   Genitourinary: Negative for dysuria, frequency, genital sores, hematuria, pelvic pain, urgency, vaginal bleeding and vaginal discharge.   Musculoskeletal: Negative for arthralgias, joint swelling and myalgias.   Skin: Positive for rash.   Neurological: Negative for dizziness, weakness, headaches and paresthesias.   Psychiatric/Behavioral: Negative for mood changes. The patient is not nervous/anxious.         OBJECTIVE:   /66 (BP Location: Right arm, Patient Position: Right side, Cuff Size: Adult Regular)  Pulse 75  Temp 99.2  F (37.3  C) (Tympanic)  Ht 4' 10.5\" (1.486 m)  Wt 128 lb (58.1 kg)  LMP 10/08/2018 (Exact Date)  SpO2 100%  BMI 26.3 kg/m2  Physical Exam  GENERAL: healthy, alert and no distress  EYES: Eyes grossly normal to inspection, PERRL and conjunctivae and sclerae normal  HENT: ear canals and TM's normal, nose and mouth without ulcers or lesions  NECK: no adenopathy, no asymmetry, masses, or scars and thyroid normal to palpation  RESP: lungs clear to " auscultation - no rales, rhonchi or wheezes  BREAST: right breast without palpable masses, left breast with multiple cystic structures palpable - most prominent is 2cm freely mobile, round mass at 4 o'clock position (significantly decreased in size per patient), no tenderness or nipple discharge and no palpable axillary masses or adenopathy.  No overlying skin changes to breasts.  CV: regular rate and rhythm, normal S1 S2, no S3 or S4, no murmur, click or rub, no peripheral edema and peripheral pulses strong  ABDOMEN: soft, nontender, no hepatosplenomegaly, no masses and bowel sounds normal  MS: no gross musculoskeletal defects noted, no edema  SKIN: hypopigmented, scaling plaque posterior hair line  NEURO: Normal strength and tone, mentation intact and speech normal  PSYCH: mentation appears normal, affect normal/bright    Diagnostic Test Results:  Lab results reviewed in detail today    ASSESSMENT/PLAN:       ICD-10-CM    1. Routine general medical examination at a health care facility Z00.00 Reviewed recent labs   2. Hypothyroidism, unspecified type E03.9 On replacement, due for repeat labs in 6 weeks, TSH had been slightly suppressed on recent labs   3. Vitamin D deficiency E55.9 Reviewed supplementation today   4. Psoriasis L40.9 Sees dermatology   5. Bilateral breast cysts N60.01 Patient has had diagnostic imaging and biopsy within the last year (3/2018).   Current cyst in left breast is decreasing in size.  Planned together to have patient monitor this for the next month.  If not continuing to decrease in size or if noticing new changes, will pursue repeat imaging at that time.  If resolves, will repeat breast imaging 3/2019    N60.02        COUNSELING:  Special attention given to:        Regular exercise       Healthy diet/nutrition       Vision screening       Immunizations    Declined: Influenza due to Concerns about side effects/safety            BP Readings from Last 1 Encounters:   10/09/18 100/66  "    Estimated body mass index is 26.3 kg/(m^2) as calculated from the following:    Height as of this encounter: 4' 10.5\" (1.486 m).    Weight as of this encounter: 128 lb (58.1 kg).      Weight management plan: Discussed healthy diet and exercise guidelines and patient will follow up in 12 months in clinic to re-evaluate.     reports that she has never smoked. She has never used smokeless tobacco.      Counseling Resources:  ATP IV Guidelines  Pooled Cohorts Equation Calculator  Breast Cancer Risk Calculator  FRAX Risk Assessment  ICSI Preventive Guidelines  Dietary Guidelines for Americans, 2010  USDA's MyPlate  ASA Prophylaxis  Lung CA Screening    Nyasia Wise MD  [facility]  "

## 2018-10-09 NOTE — PATIENT INSTRUCTIONS
Keep me updated about the cyst in your left breast - alert me if not decreasing in size and we can pursue breast ultrasound, otherwise we will plan on mammogram in March    Repeat your thyroid testing in about 6 weeks      Preventive Health Recommendations  Female Ages 40 to 49    Yearly exam:     See your health care provider every year in order to  1. Review health changes.   2. Discuss preventive care.    3. Review your medicines if your doctor prescribed any.      Get a Pap test every three years (unless you have an abnormal result and your provider advises testing more often).      If you get Pap tests with HPV test, you only need to test every 5 years, unless you have an abnormal result. You do not need a Pap test if your uterus was removed (hysterectomy) and you have not had cancer.      You should be tested each year for STDs (sexually transmitted diseases), if you're at risk.     Ask your doctor if you should have a mammogram.      Have a colonoscopy (test for colon cancer) if someone in your family has had colon cancer or polyps before age 50.       Have a cholesterol test every 5 years.       Have a diabetes test (fasting glucose) after age 45. If you are at risk for diabetes, you should have this test every 3 years.    Shots: Get a flu shot each year. Get a tetanus shot every 10 years.     Nutrition:     Eat at least 5 servings of fruits and vegetables each day.    Eat whole-grain bread, whole-wheat pasta and brown rice instead of white grains and rice.    Get adequate Calcium and Vitamin D.      Lifestyle    Exercise at least 150 minutes a week (an average of 30 minutes a day, 5 days a week). This will help you control your weight and prevent disease.    Limit alcohol to one drink per day.    No smoking.     Wear sunscreen to prevent skin cancer.    See your dentist every six months for an exam and cleaning.

## 2018-11-07 DIAGNOSIS — E03.9 HYPOTHYROIDISM, UNSPECIFIED TYPE: ICD-10-CM

## 2018-11-07 PROCEDURE — 84443 ASSAY THYROID STIM HORMONE: CPT | Performed by: PEDIATRICS

## 2018-11-07 PROCEDURE — 36415 COLL VENOUS BLD VENIPUNCTURE: CPT | Performed by: PEDIATRICS

## 2018-11-08 LAB — TSH SERPL DL<=0.005 MIU/L-ACNC: 0.7 MU/L (ref 0.4–4)

## 2018-12-26 ENCOUNTER — MYC MEDICAL ADVICE (OUTPATIENT)
Dept: PEDIATRICS | Facility: CLINIC | Age: 48
End: 2018-12-26

## 2019-01-26 NOTE — ADDENDUM NOTE
Encounter addended by: Chantel Rivas, PT on: 1/25/2019 8:41 PM   Actions taken: Sign clinical note, Flowsheet accepted, Episode resolved

## 2019-01-26 NOTE — PROGRESS NOTES
"   04/11/18 3232   Signing Clinician's Name / Credentials   Signing clinician's name / credentials Susan Gabriel, PT   Session Number   Session Number Episode of care 2/5/18-4/11/18. Pt participated in 6 PT visits including evaluation to address sxs of B BPPV. See details of pt's final visit below. Following pt's session she canceled her final visit without reason provided reporting preferance not to reschedule. Pt being d/c'd per request.     Goal 1   Goal Identifier BPPV   Goal Description Pt will demonstrate (-) s/s of BPPV throughout positional testing of all canals and no longer report dizziness with turning in bed.   Target Date 03/20/18   Goal 2   Goal Identifier DHI (baseline: 34/100)   Goal Description Pt to score 0/100 per DHI with resolved sxs of dizziness >/= 24-28 hours.    Target Date 03/20/18   Goal 3   Goal Identifier Exercise (baseline: avoiding walking, yoga d/t dizziness)   Goal Description Participating in prefered exercise (ie walking, yoga) without sxs of dizziness.   Target Date 03/20/18   Goal 4   Goal Identifier VOR (baseline: requests therapist stop assessmen d/t sxs nausea  \"4-4.5/6\")   Goal Description Pt to demonstrate improved VOR per DVA assessment to WNL with 1-2 line difference.    Target Date 03/20/18   Subjective Report   Subjective Report Patient only feels vertigo with head roll to R when laying on the floor doing stretches. The room does not spin, not a true vertigo as in the past, but it happens in her head. \"I can live with this\" if this is all it is going to be. Patient is avoiding certain activites ie Yoga with inverted posed for fear of having vertigo return.   System Outcome Measures   Dizziness Handicap Inventory (score out of 100) A decrease in score by 17.18 or greater indicates a clinically significant change in symptoms. 6   Treatment Interventions   Interventions Neuromuscular Re-education;Dynamic Visual Acuity (DVA)   Neuromuscular Re-education   Skilled " "Intervention DHI. DVA. HEP   Patient Response dizziness with VOR x 1 and VOR cancel, pt with h/o motion sensitivity since a child, worse since concussion.  abnormal DVA with 5 line loss and dizziness but no apparent functional deficit.   Treatment Detail Habituation of R head roll, no pillow. Patient reports onset of spin in her head with 1st rep lasting about 30\", each subsequent rep up to 5 was symptom free. pt education on theory of habituation. standing VOR x 1 with near target, blank and busy backgrounds, horiz and vertical head movements. standing VOR cancelation in busy environment, 5-6 reps x 2.   Progress goal 1 met, in progress on rest   Dynamic Visual Acuity (DVA)   Static Acuity (LogMar) 20/13   Horizontal Head Movement at 1 Hz (LogMar) 20/20   Horizontal Head Movement at 2 Hz (LogMar) 20/40   DVA Comments abnormal, dizziness   Education   Learner Patient   Readiness Eager   Method Booklet/handout;Explanation;Demonstration   Response Demonstrates Understanding;Verbalizes Understanding   Education Comments Habituation, adaptation. HEP   Plan   Home program VORx 1, VORc, habituation R head roll in supine until 2 days symptom free.   Plan for next session follow up in 3 weeks   Comments   Comments DISCHARGE - per pt request.   Total Session Time   Timed Code Treatment Minutes 40   Total Treatment Time (sum of timed and untimed services) 40     "

## 2019-01-30 NOTE — ADDENDUM NOTE
Encounter addended by: Cielo De León, PT on: 1/30/2019 11:40 AM   Actions taken: Sign clinical note, Episode resolved

## 2019-05-06 ENCOUNTER — OFFICE VISIT (OUTPATIENT)
Dept: PEDIATRICS | Facility: CLINIC | Age: 49
End: 2019-05-06
Payer: COMMERCIAL

## 2019-05-06 VITALS
OXYGEN SATURATION: 99 % | TEMPERATURE: 98.6 F | SYSTOLIC BLOOD PRESSURE: 102 MMHG | BODY MASS INDEX: 25.2 KG/M2 | WEIGHT: 125 LBS | HEIGHT: 59 IN | HEART RATE: 69 BPM | DIASTOLIC BLOOD PRESSURE: 60 MMHG

## 2019-05-06 DIAGNOSIS — L70.0 OPEN COMEDONE: ICD-10-CM

## 2019-05-06 DIAGNOSIS — L40.9 PSORIASIS: Primary | ICD-10-CM

## 2019-05-06 PROCEDURE — 99213 OFFICE O/P EST LOW 20 MIN: CPT | Performed by: INTERNAL MEDICINE

## 2019-05-06 ASSESSMENT — MIFFLIN-ST. JEOR: SCORE: 1104.69

## 2019-05-06 NOTE — PROGRESS NOTES
"SUBJECTIVE:                                                    Swetha Flores is a 48 year old female who presents to clinic today for the following health issues:    Concerns regarding \"mole \"in the pinna of the left ear.  Lesion present for several months.  Has not changed significantly in size.    Recent visit with dermatology regarding rash in the scalp.  Prescribed a topical steroid, requests a second opinion.      Patient Active Problem List   Diagnosis     Hypothyroidism     Vitamin D deficiency     Vegan diet     Psoriasis     Bilateral breast cysts     Past Surgical History:   Procedure Laterality Date     C NONSPECIFIC PROCEDURE  2001, 2004    2 NSVDs        Social History     Tobacco Use     Smoking status: Never Smoker     Smokeless tobacco: Never Used   Substance Use Topics     Alcohol use: No     Family History   Problem Relation Age of Onset     Arthritis Father      Cardiovascular Father      Lipids Mother      Depression Mother      Thyroid Disease Mother      Breast Cancer No family hx of          Current Outpatient Medications   Medication Sig Dispense Refill     Multiple Vitamins-Minerals (WOMENS MULTI VITAMIN & MINERAL PO) Take  by mouth.       SYNTHROID 112 MCG tablet Take 1 tablet (112 mcg) by mouth daily 90 tablet 3         OBJECTIVE:                                                    /60 (Cuff Size: Adult Regular)   Pulse 69   Temp 98.6  F (37  C) (Oral)   Ht 1.486 m (4' 10.5\")   Wt 56.7 kg (125 lb)   SpO2 99%   BMI 25.68 kg/m   Body mass index is 25.68 kg/m .  GENERAL:  alert,  no distress  HENT: ear canals- normal; TMs- normal; oropharynx-normal       Left pinna: Approximately 7 mm dusky, hyperpigmented exophytic lesion with a small central open pore.  Manipulation of the lesion with alcohol swab and sterile swab: Small amount of tan semisolid debris extruded with decompression of lesion.  Dermatologic: Well demarcated thickened lichenified plaque with silvery scale on the neck " extending to the occiput within the hairline       ASSESSMENT/PLAN:                                                        ICD-10-CM    1. Psoriasis L40.9  previously diagnosed as psoriasis.  Exam is characteristic for psoriasis today.  Did recommend patient proceed with topical steroids prescribed through dermatology.     2. Open comedone L70.0 -open comedone, drained today.  Recommended periodic warm moist compresses with mild soap as needed        Collin Gambino MD  [facility]

## 2019-09-06 DIAGNOSIS — E03.9 HYPOTHYROIDISM, UNSPECIFIED TYPE: Primary | ICD-10-CM

## 2019-09-06 RX ORDER — LEVOTHYROXINE SODIUM 125 MCG
TABLET ORAL
Qty: 90 TABLET | Refills: 0 | Status: SHIPPED | OUTPATIENT
Start: 2019-09-06 | End: 2019-12-09

## 2019-09-06 NOTE — TELEPHONE ENCOUNTER
"Requested Prescriptions   Pending Prescriptions Disp Refills     SYNTHROID 125 MCG tablet [Pharmacy Med Name: SYNTHROID 125 MCG TABLET]    Last Written Prescription Date:  9/30/2018  Last Fill Quantity: 90,  # refills: 3   Last office visit: 5/6/2019 with prescribing provider:  Nyasia Wise       Future Office Visit:     90 tablet 3     Sig: TAKE 1 TABLET BY MOUTH DAILY       Thyroid Protocol Passed - 9/6/2019  1:18 AM        Passed - Patient is 12 years or older        Passed - Recent (12 mo) or future (30 days) visit within the authorizing provider's specialty     Patient had office visit in the last 12 months or has a visit in the next 30 days with authorizing provider or within the authorizing provider's specialty.  See \"Patient Info\" tab in inbasket, or \"Choose Columns\" in Meds & Orders section of the refill encounter.              Passed - Medication is active on med list        Passed - Normal TSH on file in past 12 months     Recent Labs   Lab Test 11/07/18  1109   TSH 0.70              Passed - No active pregnancy on record     If patient is pregnant or has had a positive pregnancy test, please check TSH.          Passed - No positive pregnancy test in past 12 months     If patient is pregnant or has had a positive pregnancy test, please check TSH.            "

## 2019-09-06 NOTE — TELEPHONE ENCOUNTER
Prescription approved per [facility]ill [facility].    Angeles Mackenzie RN   [facility]

## 2019-11-01 ENCOUNTER — HOSPITAL ENCOUNTER (OUTPATIENT)
Dept: MAMMOGRAPHY | Facility: CLINIC | Age: 49
Discharge: HOME | End: 2019-11-01
Attending: PEDIATRICS | Admitting: PEDIATRICS
Payer: COMMERCIAL

## 2019-11-01 DIAGNOSIS — Z12.31 VISIT FOR SCREENING MAMMOGRAM: ICD-10-CM

## 2019-11-01 PROCEDURE — 77063 BREAST TOMOSYNTHESIS BI: CPT

## 2019-12-09 DIAGNOSIS — E03.9 HYPOTHYROIDISM, UNSPECIFIED TYPE: ICD-10-CM

## 2019-12-09 NOTE — LETTER
[facility] [place]  [address] [place] [address]  [phone]      December 24, 2019    Swetha Velasquez [address] [place] [address]              Dear Swetha,                                       We have been trying to reach you in regards to scheduling your Yearly Preventative Care Visit + Fasting Labs in order to avoid a lapse in your medication for SYNTHROID 125 MCG tablet. Please contact our office so we can further assist you in scheduling.                      Sincerely,  [facility] [place]

## 2019-12-10 RX ORDER — LEVOTHYROXINE SODIUM 125 MCG
TABLET ORAL
Qty: 90 TABLET | Refills: 0 | Status: SHIPPED | OUTPATIENT
Start: 2019-12-10 | End: 2020-03-16

## 2019-12-10 NOTE — TELEPHONE ENCOUNTER
"Patient due for fasting office visit-90 days supply given.  Routing to team to schedule appointment     Dorothy SANTOS RN  [facility]  [phone]       Last office visit: 5/6/2019 with prescribing provider:     Future Office Visit:      Requested Prescriptions   Pending Prescriptions Disp Refills     SYNTHROID 125 MCG tablet [Pharmacy Med Name: SYNTHROID 125 MCG TABLET] 90 tablet 0     Sig: TAKE 1 TABLET BY MOUTH EVERY DAY       Thyroid Protocol Failed - 12/9/2019  2:14 AM        Failed - Normal TSH on file in past 12 months     Recent Labs   Lab Test 11/07/18  1109   TSH 0.70              Passed - Patient is 12 years or older        Passed - Recent (12 mo) or future (30 days) visit within the authorizing provider's specialty     Patient has had an office visit with the authorizing provider or a provider within the authorizing providers department within the previous 12 mos or has a future within next 30 days. See \"Patient Info\" tab in inbasket, or \"Choose Columns\" in Meds & Orders section of the refill encounter.              Passed - Medication is active on med list        Passed - No active pregnancy on record     If patient is pregnant or has had a positive pregnancy test, please check TSH.          Passed - No positive pregnancy test in past 12 months     If patient is pregnant or has had a positive pregnancy test, please check TSH.            "

## 2019-12-10 NOTE — TELEPHONE ENCOUNTER
1st attempt, LVM for pt to call back to schedule a Preventative Care Visit. Please assist pt in scheduling upon call back, thanks!    Margo Wilcox,     on 12/10/2019 at 9:45 AM

## 2020-03-14 DIAGNOSIS — E03.9 HYPOTHYROIDISM, UNSPECIFIED TYPE: ICD-10-CM

## 2020-03-16 RX ORDER — LEVOTHYROXINE SODIUM 125 MCG
125 TABLET ORAL DAILY
Qty: 90 TABLET | Refills: 0 | Status: SHIPPED | OUTPATIENT
Start: 2020-03-16 | End: 2020-06-22

## 2020-06-19 ENCOUNTER — MYC REFILL (OUTPATIENT)
Dept: PEDIATRICS | Facility: CLINIC | Age: 50
End: 2020-06-19

## 2020-06-19 DIAGNOSIS — E03.9 HYPOTHYROIDISM, UNSPECIFIED TYPE: ICD-10-CM

## 2020-06-19 RX ORDER — LEVOTHYROXINE SODIUM 125 MCG
125 TABLET ORAL DAILY
Qty: 90 TABLET | Refills: 0 | Status: CANCELLED | OUTPATIENT
Start: 2020-06-19

## 2020-06-22 ENCOUNTER — VIRTUAL VISIT (OUTPATIENT)
Dept: PEDIATRICS | Facility: CLINIC | Age: 50
End: 2020-06-22
Payer: COMMERCIAL

## 2020-06-22 DIAGNOSIS — E55.9 VITAMIN D DEFICIENCY: ICD-10-CM

## 2020-06-22 DIAGNOSIS — E03.9 HYPOTHYROIDISM, UNSPECIFIED TYPE: Primary | ICD-10-CM

## 2020-06-22 DIAGNOSIS — Z78.9 VEGAN DIET: ICD-10-CM

## 2020-06-22 PROCEDURE — 99213 OFFICE O/P EST LOW 20 MIN: CPT | Mod: TEL | Performed by: PEDIATRICS

## 2020-06-22 RX ORDER — LEVOTHYROXINE SODIUM 125 MCG
125 TABLET ORAL DAILY
Qty: 90 TABLET | Refills: 1 | Status: SHIPPED | OUTPATIENT
Start: 2020-06-22 | End: 2020-12-23

## 2020-06-22 NOTE — PROGRESS NOTES
"Swetha Flores is a 49 year old female who is being evaluated via a billable telephone visit.      The patient has been notified of following:     \"This telephone visit will be conducted via a call between you and your physician/provider. We have found that certain health care needs can be provided without the need for a physical exam.  This service lets us provide the care you need with a short phone conversation.  If a prescription is necessary we can send it directly to your pharmacy.  If lab work is needed we can place an order for that and you can then stop by our lab to have the test done at a later time.    Telephone visits are billed at different rates depending on your insurance coverage. During this emergency period, for some insurers they may be billed the same as an in-person visit.  Please reach out to your insurance provider with any questions.    If during the course of the call the physician/provider feels a telephone visit is not appropriate, you will not be charged for this service.\"    Patient has given verbal consent for Telephone visit?  Yes Jennifer Scott CMA on 6/22/2020 at 9:15 AM      What phone number would you like to be contacted at? [phone]    How would you like to obtain your AVS? Trudi    Subjective     Swetha Flores is a 49 year old female who presents via phone visit today for the following health issues:    HPI  Medication Followup of Synthroid    Taking Medication as prescribed: yes    Side Effects:  None    Medication Helping Symptoms:  yes       Working from home and teenage children are home - doing ok.  Ergonomic work station.    Hypothyroidism - feels that it is well controlled over all.   No new symptoms of low or high thyroid.  Has to use name brand Synthroid - previously did poorly on generic.    Son has asthma and patient is not comfortable coming into clinic for labs at this time.  She would be comfortable coming in the fall if the case numbers continue to drop.  " She'll be due for a physical with a pap smear at that time too and she is aware.      Reviewed and updated as needed this visit by Provider         Review of Systems   Constitutional, endo systems are negative, except as otherwise noted.         Diagnostic Test Results:  Labs reviewed in Epic  Future lab orders placed        Assessment/Plan:        ICD-10-CM    1. Hypothyroidism, unspecified type  E03.9 **TSH with free T4 reflex FUTURE anytime     **Comprehensive metabolic panel FUTURE anytime     Lipid panel reflex to direct LDL Fasting     SYNTHROID 125 MCG tablet    Plan for labs when patient comfortable coming in - future orders placed.  Due for physical this fall     2. Vitamin D deficiency  E55.9 **Vitamin D Deficiency FUTURE anytime   3. Vegan diet  Z78.9 **CBC with platelets FUTURE anytime     Vitamin B12       Phone call duration:  6 minutes    Nyasia Wise MD      0

## 2020-11-22 ENCOUNTER — HEALTH MAINTENANCE LETTER (OUTPATIENT)
Age: 50
End: 2020-11-22

## 2020-12-22 DIAGNOSIS — E03.9 HYPOTHYROIDISM, UNSPECIFIED TYPE: ICD-10-CM

## 2020-12-23 RX ORDER — LEVOTHYROXINE SODIUM 125 MCG
125 TABLET ORAL DAILY
Qty: 90 TABLET | Refills: 1 | Status: SHIPPED | OUTPATIENT
Start: 2020-12-23 | End: 2021-06-22

## 2020-12-23 NOTE — TELEPHONE ENCOUNTER
Routing refill request to provider for review/approval because:  Labs not current:  Last TSH 11/7/18.      Danielle Joseph RN

## 2021-01-04 ENCOUNTER — MYC MEDICAL ADVICE (OUTPATIENT)
Dept: PEDIATRICS | Facility: CLINIC | Age: 51
End: 2021-01-04

## 2021-01-04 NOTE — TELEPHONE ENCOUNTER
Called and spoke with patient. Patient states that she was last sexually active 1 week ago. At that time she used a different brand of condom and she question's if they used it correctly. Informed patient that she is outside of the window where the morning after pill would be effective. Requested that she not try this.     Patient states that her periods have become quite irregular. Her cycles have ranged between 15 days to a month and a half. Offered patient an appointment to discuss possible perimenopausal symptoms and irregular periods. Patient declined.     Patient will take a pregnancy test tomorrow morning and follow up if it is positive.     Shantell Raygoza RN on 1/4/2021 at 4:27 PM

## 2021-01-15 ENCOUNTER — HEALTH MAINTENANCE LETTER (OUTPATIENT)
Age: 51
End: 2021-01-15

## 2021-05-27 ENCOUNTER — RECORDS - HEALTHEAST (OUTPATIENT)
Dept: ADMINISTRATIVE | Facility: CLINIC | Age: 51
End: 2021-05-27

## 2021-09-19 ENCOUNTER — HEALTH MAINTENANCE LETTER (OUTPATIENT)
Age: 51
End: 2021-09-19

## 2021-09-22 ENCOUNTER — MYC MEDICAL ADVICE (OUTPATIENT)
Dept: PEDIATRICS | Facility: CLINIC | Age: 51
End: 2021-09-22

## 2021-09-23 ENCOUNTER — DOCUMENTATION ONLY (OUTPATIENT)
Dept: LAB | Facility: CLINIC | Age: 51
End: 2021-09-23

## 2021-09-23 ENCOUNTER — MYC MEDICAL ADVICE (OUTPATIENT)
Dept: PEDIATRICS | Facility: CLINIC | Age: 51
End: 2021-09-23

## 2021-09-23 DIAGNOSIS — E03.9 HYPOTHYROIDISM, UNSPECIFIED TYPE: Primary | ICD-10-CM

## 2021-09-23 DIAGNOSIS — E03.9 HYPOTHYROIDISM, UNSPECIFIED TYPE: ICD-10-CM

## 2021-09-23 NOTE — TELEPHONE ENCOUNTER
Patient has a fasting lab appointment set up for 10/12 and she is requesting a refill of her synthroid until this lab visit. I also sent her a message reminding her to schedule an office visit since it has been over a year since she was last seen.

## 2021-09-23 NOTE — PROGRESS NOTES
Please place or confirm orders for upcoming lab appointment on 2021. Orders in chart are . Thank You.

## 2021-09-24 RX ORDER — LEVOTHYROXINE SODIUM 125 MCG
125 TABLET ORAL DAILY
Qty: 90 TABLET | Refills: 3 | Status: SHIPPED | OUTPATIENT
Start: 2021-09-24 | End: 2022-03-11

## 2021-10-12 ENCOUNTER — LAB (OUTPATIENT)
Dept: LAB | Facility: CLINIC | Age: 51
End: 2021-10-12
Payer: COMMERCIAL

## 2021-10-12 DIAGNOSIS — E55.9 VITAMIN D DEFICIENCY: ICD-10-CM

## 2021-10-12 DIAGNOSIS — E03.9 HYPOTHYROIDISM, UNSPECIFIED TYPE: ICD-10-CM

## 2021-10-12 DIAGNOSIS — Z13.220 LIPID SCREENING: ICD-10-CM

## 2021-10-12 DIAGNOSIS — Z78.9 VEGAN DIET: ICD-10-CM

## 2021-10-12 LAB
ALBUMIN SERPL-MCNC: 3.6 G/DL (ref 3.4–5)
ALP SERPL-CCNC: 60 U/L (ref 40–150)
ALT SERPL W P-5'-P-CCNC: 23 U/L (ref 0–50)
ANION GAP SERPL CALCULATED.3IONS-SCNC: 7 MMOL/L (ref 3–14)
AST SERPL W P-5'-P-CCNC: 13 U/L (ref 0–45)
BILIRUB SERPL-MCNC: 0.6 MG/DL (ref 0.2–1.3)
BUN SERPL-MCNC: 15 MG/DL (ref 7–30)
CALCIUM SERPL-MCNC: 9.2 MG/DL (ref 8.5–10.1)
CHLORIDE BLD-SCNC: 107 MMOL/L (ref 94–109)
CHOLEST SERPL-MCNC: 227 MG/DL
CO2 SERPL-SCNC: 24 MMOL/L (ref 20–32)
CREAT SERPL-MCNC: 0.71 MG/DL (ref 0.52–1.04)
DEPRECATED CALCIDIOL+CALCIFEROL SERPL-MC: 34 UG/L (ref 20–75)
FASTING STATUS PATIENT QL REPORTED: YES
GFR SERPL CREATININE-BSD FRML MDRD: >90 ML/MIN/1.73M2
GLUCOSE BLD-MCNC: 80 MG/DL (ref 70–99)
HDLC SERPL-MCNC: 66 MG/DL
LDLC SERPL CALC-MCNC: 144 MG/DL
NONHDLC SERPL-MCNC: 161 MG/DL
POTASSIUM BLD-SCNC: 4.1 MMOL/L (ref 3.4–5.3)
PROT SERPL-MCNC: 7.2 G/DL (ref 6.8–8.8)
SODIUM SERPL-SCNC: 138 MMOL/L (ref 133–144)
TRIGL SERPL-MCNC: 87 MG/DL
TSH SERPL DL<=0.005 MIU/L-ACNC: 0.6 MU/L (ref 0.4–4)

## 2021-10-12 PROCEDURE — 80061 LIPID PANEL: CPT

## 2021-10-12 PROCEDURE — 82306 VITAMIN D 25 HYDROXY: CPT

## 2021-10-12 PROCEDURE — 80053 COMPREHEN METABOLIC PANEL: CPT

## 2021-10-12 PROCEDURE — 36415 COLL VENOUS BLD VENIPUNCTURE: CPT

## 2021-10-12 PROCEDURE — 84443 ASSAY THYROID STIM HORMONE: CPT

## 2021-10-26 ENCOUNTER — MYC MEDICAL ADVICE (OUTPATIENT)
Dept: PEDIATRICS | Facility: CLINIC | Age: 51
End: 2021-10-26

## 2021-10-26 NOTE — TELEPHONE ENCOUNTER
Routing to station B.     See below. Any options with extenders at all or same day?    Jennifer Medina, RN   [facility]  -- Triage Nurse

## 2021-10-26 NOTE — TELEPHONE ENCOUNTER
Called patient, scheduled with Yessica tomorrow for breast exam. Patient had limited availability.     Pratibha Lockett CMA

## 2021-10-27 ENCOUNTER — OFFICE VISIT (OUTPATIENT)
Dept: PEDIATRICS | Facility: CLINIC | Age: 51
End: 2021-10-27
Payer: COMMERCIAL

## 2021-10-27 VITALS
HEART RATE: 72 BPM | SYSTOLIC BLOOD PRESSURE: 102 MMHG | DIASTOLIC BLOOD PRESSURE: 60 MMHG | TEMPERATURE: 98.3 F | RESPIRATION RATE: 16 BRPM | BODY MASS INDEX: 26.5 KG/M2 | WEIGHT: 129 LBS | OXYGEN SATURATION: 100 %

## 2021-10-27 DIAGNOSIS — N63.0 BREAST MASS: Primary | ICD-10-CM

## 2021-10-27 PROCEDURE — 99213 OFFICE O/P EST LOW 20 MIN: CPT | Performed by: PHYSICIAN ASSISTANT

## 2021-10-27 NOTE — PROGRESS NOTES
Assessment & Plan     Breast mass  Proceed with imaging as directed.  - MA Diagnostic Digital Left; Future    EMELYN Garcia [facility] RASHID Posada is a 50 year old who presents for the following health issues:    HPI     Breast Concern  Onset/Duration: 2 weeks  Description:   Location: top and left side of left breast  Pain or tenderness: YES  Redness: no  Intensity: moderate  Progression of Symptoms: worsening  Accompanying Signs & Symptoms:  Any lumps in axillary region: no  Movable: YES  Nipple discharge: none  Changes in the skin or nipple: non  On Hormone therapy:  no   Does it change with menstrual cycle: no  Previous history of similar problem: yes   First degree relative with breast cancer: a negative family history for breast cancer.  Precipitating factors:           Worsened by: none  Alleviating factors:            Improved by: heating helps a little   Therapies tried and outcome: None  Patient's last menstrual period was 10/07/2021.   Mammogram: 1/2019    Review of Systems   Constitutional, HEENT, cardiovascular, pulmonary, gi and gu systems are negative, except as otherwise noted.      Objective    /60   Pulse 72   Temp 98.3  F (36.8  C) (Temporal)   Resp 16   Wt 58.5 kg (129 lb)   LMP 10/07/2021   SpO2 100%   Breastfeeding No   BMI 26.50 kg/m    Body mass index is 26.5 kg/m .  Physical Exam   GENERAL: healthy, alert and no distress  BREAST: left breast with 5-6 cm mass at 12 o'clock. Mildly erythemic skin. Tender.     No results found for any visits on 10/27/21.

## 2021-11-02 ENCOUNTER — HOSPITAL ENCOUNTER (OUTPATIENT)
Dept: ULTRASOUND IMAGING | Facility: CLINIC | Age: 51
End: 2021-11-02
Attending: PHYSICIAN ASSISTANT
Payer: COMMERCIAL

## 2021-11-02 DIAGNOSIS — N63.0 BREAST MASS: ICD-10-CM

## 2021-11-02 PROCEDURE — 76642 ULTRASOUND BREAST LIMITED: CPT | Mod: LT

## 2021-12-21 ENCOUNTER — HOSPITAL ENCOUNTER (OUTPATIENT)
Dept: MAMMOGRAPHY | Facility: CLINIC | Age: 51
Discharge: HOME | End: 2021-12-21
Attending: PEDIATRICS | Admitting: PEDIATRICS
Payer: COMMERCIAL

## 2021-12-21 DIAGNOSIS — Z12.31 VISIT FOR SCREENING MAMMOGRAM: ICD-10-CM

## 2021-12-21 PROCEDURE — 77063 BREAST TOMOSYNTHESIS BI: CPT

## 2022-01-08 ENCOUNTER — HEALTH MAINTENANCE LETTER (OUTPATIENT)
Age: 52
End: 2022-01-08

## 2022-03-08 ENCOUNTER — OFFICE VISIT (OUTPATIENT)
Dept: PEDIATRICS | Facility: CLINIC | Age: 52
End: 2022-03-08
Payer: COMMERCIAL

## 2022-03-08 VITALS
DIASTOLIC BLOOD PRESSURE: 80 MMHG | OXYGEN SATURATION: 100 % | RESPIRATION RATE: 16 BRPM | TEMPERATURE: 98.8 F | WEIGHT: 132 LBS | HEART RATE: 61 BPM | HEIGHT: 58 IN | BODY MASS INDEX: 27.71 KG/M2 | SYSTOLIC BLOOD PRESSURE: 122 MMHG

## 2022-03-08 DIAGNOSIS — Z12.4 CERVICAL CANCER SCREENING: ICD-10-CM

## 2022-03-08 DIAGNOSIS — Z78.9 VEGETARIAN DIET: ICD-10-CM

## 2022-03-08 DIAGNOSIS — E55.9 VITAMIN D DEFICIENCY: ICD-10-CM

## 2022-03-08 DIAGNOSIS — S06.300S: ICD-10-CM

## 2022-03-08 DIAGNOSIS — Z00.00 ROUTINE HISTORY AND PHYSICAL EXAMINATION OF ADULT: Primary | ICD-10-CM

## 2022-03-08 DIAGNOSIS — Z12.11 COLON CANCER SCREENING: ICD-10-CM

## 2022-03-08 DIAGNOSIS — E03.9 HYPOTHYROIDISM, UNSPECIFIED TYPE: ICD-10-CM

## 2022-03-08 LAB
ERYTHROCYTE [DISTWIDTH] IN BLOOD BY AUTOMATED COUNT: 13.1 % (ref 10–15)
HCT VFR BLD AUTO: 38.4 % (ref 35–47)
HGB BLD-MCNC: 13.1 G/DL (ref 11.7–15.7)
MCH RBC QN AUTO: 29.4 PG (ref 26.5–33)
MCHC RBC AUTO-ENTMCNC: 34.1 G/DL (ref 31.5–36.5)
MCV RBC AUTO: 86 FL (ref 78–100)
PLATELET # BLD AUTO: 215 10E3/UL (ref 150–450)
RBC # BLD AUTO: 4.46 10E6/UL (ref 3.8–5.2)
VIT B12 SERPL-MCNC: 366 PG/ML (ref 193–986)
WBC # BLD AUTO: 6.3 10E3/UL (ref 4–11)

## 2022-03-08 PROCEDURE — G0145 SCR C/V CYTO,THINLAYER,RESCR: HCPCS | Performed by: PEDIATRICS

## 2022-03-08 PROCEDURE — 83721 ASSAY OF BLOOD LIPOPROTEIN: CPT | Performed by: PEDIATRICS

## 2022-03-08 PROCEDURE — 80048 BASIC METABOLIC PNL TOTAL CA: CPT | Performed by: PEDIATRICS

## 2022-03-08 PROCEDURE — 36415 COLL VENOUS BLD VENIPUNCTURE: CPT | Performed by: PEDIATRICS

## 2022-03-08 PROCEDURE — 84443 ASSAY THYROID STIM HORMONE: CPT | Performed by: PEDIATRICS

## 2022-03-08 PROCEDURE — 82306 VITAMIN D 25 HYDROXY: CPT | Performed by: PEDIATRICS

## 2022-03-08 PROCEDURE — 82607 VITAMIN B-12: CPT | Performed by: PEDIATRICS

## 2022-03-08 PROCEDURE — 85027 COMPLETE CBC AUTOMATED: CPT | Performed by: PEDIATRICS

## 2022-03-08 PROCEDURE — 87624 HPV HI-RISK TYP POOLED RSLT: CPT | Performed by: PEDIATRICS

## 2022-03-08 PROCEDURE — 99396 PREV VISIT EST AGE 40-64: CPT | Performed by: PEDIATRICS

## 2022-03-08 SDOH — HEALTH STABILITY: PHYSICAL HEALTH: ON AVERAGE, HOW MANY DAYS PER WEEK DO YOU ENGAGE IN MODERATE TO STRENUOUS EXERCISE (LIKE A BRISK WALK)?: 4 DAYS

## 2022-03-08 SDOH — ECONOMIC STABILITY: TRANSPORTATION INSECURITY
IN THE PAST 12 MONTHS, HAS LACK OF TRANSPORTATION KEPT YOU FROM MEETINGS, WORK, OR FROM GETTING THINGS NEEDED FOR DAILY LIVING?: NO

## 2022-03-08 SDOH — ECONOMIC STABILITY: FOOD INSECURITY: WITHIN THE PAST 12 MONTHS, YOU WORRIED THAT YOUR FOOD WOULD RUN OUT BEFORE YOU GOT MONEY TO BUY MORE.: NEVER TRUE

## 2022-03-08 SDOH — ECONOMIC STABILITY: INCOME INSECURITY: IN THE LAST 12 MONTHS, WAS THERE A TIME WHEN YOU WERE NOT ABLE TO PAY THE MORTGAGE OR RENT ON TIME?: NO

## 2022-03-08 SDOH — ECONOMIC STABILITY: INCOME INSECURITY: HOW HARD IS IT FOR YOU TO PAY FOR THE VERY BASICS LIKE FOOD, HOUSING, MEDICAL CARE, AND HEATING?: NOT HARD AT ALL

## 2022-03-08 SDOH — HEALTH STABILITY: PHYSICAL HEALTH: ON AVERAGE, HOW MANY MINUTES DO YOU ENGAGE IN EXERCISE AT THIS LEVEL?: 30 MIN

## 2022-03-08 SDOH — ECONOMIC STABILITY: FOOD INSECURITY: WITHIN THE PAST 12 MONTHS, THE FOOD YOU BOUGHT JUST DIDN'T LAST AND YOU DIDN'T HAVE MONEY TO GET MORE.: NEVER TRUE

## 2022-03-08 SDOH — ECONOMIC STABILITY: TRANSPORTATION INSECURITY
IN THE PAST 12 MONTHS, HAS THE LACK OF TRANSPORTATION KEPT YOU FROM MEDICAL APPOINTMENTS OR FROM GETTING MEDICATIONS?: NO

## 2022-03-08 ASSESSMENT — ENCOUNTER SYMPTOMS
HEARTBURN: 0
PARESTHESIAS: 0
DIZZINESS: 0
ARTHRALGIAS: 0
FEVER: 0
DIARRHEA: 0
WEAKNESS: 0
HEMATOCHEZIA: 0
PALPITATIONS: 0
CHILLS: 0
JOINT SWELLING: 0
HEMATURIA: 0
FREQUENCY: 0
SHORTNESS OF BREATH: 0
BREAST MASS: 1
CONSTIPATION: 0
NERVOUS/ANXIOUS: 0
HEADACHES: 1
EYE PAIN: 0
SORE THROAT: 0
NAUSEA: 0
DYSURIA: 0
MYALGIAS: 0
ABDOMINAL PAIN: 0
COUGH: 0

## 2022-03-08 ASSESSMENT — SOCIAL DETERMINANTS OF HEALTH (SDOH)
DO YOU BELONG TO ANY CLUBS OR ORGANIZATIONS SUCH AS CHURCH GROUPS UNIONS, FRATERNAL OR ATHLETIC GROUPS, OR SCHOOL GROUPS?: YES
HOW OFTEN DO YOU ATTEND CHURCH OR RELIGIOUS SERVICES?: PATIENT DECLINED
HOW OFTEN DO YOU GET TOGETHER WITH FRIENDS OR RELATIVES?: ONCE A WEEK
IN A TYPICAL WEEK, HOW MANY TIMES DO YOU TALK ON THE PHONE WITH FAMILY, FRIENDS, OR NEIGHBORS?: MORE THAN THREE TIMES A WEEK

## 2022-03-08 ASSESSMENT — LIFESTYLE VARIABLES
HOW OFTEN DO YOU HAVE SIX OR MORE DRINKS ON ONE OCCASION: NEVER
HOW OFTEN DO YOU HAVE A DRINK CONTAINING ALCOHOL: NEVER

## 2022-03-08 NOTE — PROGRESS NOTES
SUBJECTIVE:   CC: Swetha Flores is an 51 year old woman who presents for preventive health visit.   Patient has been advised of split billing requirements and indicates understanding: Yes     Healthy Habits:     Getting at least 3 servings of Calcium per day:  Yes    Bi-annual eye exam:  NO    Dental care twice a year:  NO    Sleep apnea or symptoms of sleep apnea:  Excessive snoring    Diet:  Vegetarian/vegan    Frequency of exercise:  2-3 days/week    Duration of exercise:  30-45 minutes    Taking medications regularly:  Yes    Medication side effects:  None    PHQ-2 Total Score: 0    Additional concerns today:  Yes      Today's PHQ-2 Score:   PHQ-2 ( 1999 [employer]) 3/8/2022   Q1: Little interest or pleasure in doing things 0   Q2: Feeling down, depressed or hopeless 0   PHQ-2 Score 0   Q1: Little interest or pleasure in doing things Not at all   Q2: Feeling down, depressed or hopeless Not at all   PHQ-2 Score 0       Abuse: Current or Past (Physical, Sexual or Emotional) - NA  Do you feel safe in your environment? Yes    Have you ever done Advance Care Planning? (For example, a Health Directive, POLST, or a discussion with a medical provider or your loved ones about your wishes): No, advance care planning information given to patient to review.  Patient plans to discuss their wishes with loved ones or provider.      Social History     Tobacco Use     Smoking status: Never Smoker     Smokeless tobacco: Never Used   Substance Use Topics     Alcohol use: No         Alcohol Use 3/8/2022   Prescreen: >3 drinks/day or >7 drinks/week? Not Applicable   Prescreen: >3 drinks/day or >7 drinks/week? -       Reviewed orders with patient.  Reviewed health maintenance and updated orders accordingly - Yes  Lab work is in process    Breast Cancer Screening:    Breast CA Risk Assessment (FHS-7) 3/8/2022   Do you have a family history of breast, colon, or ovarian cancer? No / Unknown         Mammogram Screening: Recommended annual  mammography  Pertinent mammograms are reviewed under the imaging tab.    History of abnormal Pap smear: NO - age 30-65 PAP every 5 years with negative HPV co-testing recommended  PAP / HPV Latest Ref Rng & Units 3/16/2015 5/29/2012 2/24/2009   PAP (Historical) - NIL NIL NIL   HPV16 NEG Negative - -   HPV18 NEG Negative - -   HRHPV NEG Negative - -     Reviewed and updated as needed this visit by clinical staff   Tobacco  Allergies    Med Hx  Surg Hx  Fam Hx  Soc Hx        Reviewed and updated as needed this visit by Provider                     Hypothyroidism - weight up slightly, otherwise no new symptoms of low or high thyroid.    Hx of TBI - had some symptoms recur with yoga and now having stuggle with symptoms with exercise that are troublesome and concerning to her.  No visual changes, memory changes, concentration changes.  Headache/nausea after activity are predominant symptoms.    Exertional symptoms after exercise - afterward feels a headache and nausea.  Has also been having sleep disruption.  Waking up early and can't fall asleep.    Periods stopped for a few months around the time of her father's death, but now are regular again    Due for pap smear    Hx of breast cysts that come and go - all breast imaging and diagnostic studies/biopsies have been benign      Review of Systems   Constitutional: Negative for chills and fever.   HENT: Negative for congestion, ear pain, hearing loss and sore throat.    Eyes: Negative for pain and visual disturbance.   Respiratory: Negative for cough and shortness of breath.    Cardiovascular: Negative for chest pain, palpitations and peripheral edema.   Gastrointestinal: Negative for abdominal pain, constipation, diarrhea, heartburn, hematochezia and nausea.   Breasts:  Positive for breast mass. Negative for tenderness and discharge.   Genitourinary: Negative for dysuria, frequency, genital sores, hematuria, pelvic pain, urgency, vaginal bleeding and vaginal  "discharge.   Musculoskeletal: Negative for arthralgias, joint swelling and myalgias.   Skin: Positive for rash.   Neurological: Positive for headaches. Negative for dizziness, weakness and paresthesias.   Psychiatric/Behavioral: Negative for mood changes. The patient is not nervous/anxious.           OBJECTIVE:   /80 (BP Location: Right arm, Patient Position: Sitting, Cuff Size: Adult Regular)   Pulse 61   Temp 98.8  F (37.1  C) (Tympanic)   Resp 16   Ht 1.482 m (4' 10.35\")   Wt 59.9 kg (132 lb)   SpO2 100%   BMI 27.26 kg/m     Wt Readings from Last 4 Encounters:   03/08/22 59.9 kg (132 lb)   10/27/21 58.5 kg (129 lb)   05/06/19 56.7 kg (125 lb)   10/09/18 58.1 kg (128 lb)       Physical Exam  GENERAL: healthy, alert and no distress  EYES: Eyes grossly normal to inspection, PERRL and conjunctivae and sclerae normal  HENT: ear canals and TM's normal, nose and mouth without ulcers or lesions  NECK: no adenopathy, no asymmetry, masses, or scars and thyroid normal to palpation  RESP: lungs clear to auscultation - no rales, rhonchi or wheezes  BREAST: fibrous changes more pronounced in left breast, no discrete masses either breast, no tenderness or nipple discharge and no palpable axillary masses or adenopathy  CV: regular rate and rhythm, normal S1 S2, no S3 or S4, no murmur, click or rub, no peripheral edema and peripheral pulses strong  ABDOMEN: soft, nontender, no hepatosplenomegaly, no masses and bowel sounds normal   (female): normal female external genitalia, normal urethral meatus, vaginal mucosa pink, moist, well rugated, and normal cervix/adnexa/uterus without masses or discharge  MS: no gross musculoskeletal defects noted, no edema  SKIN: no suspicious lesions or rashes  NEURO: Normal strength and tone, mentation intact and speech normal  PSYCH: mentation appears normal, affect normal/bright    Diagnostic Test Results:  pending    ASSESSMENT/PLAN:       ICD-10-CM    1. Routine history and " "physical examination of adult  Z00.00 TSH with free T4 reflex     Basic metabolic panel  (Ca, Cl, CO2, Creat, Gluc, K, Na, BUN)     Vitamin D Deficiency     CBC with platelets     Vitamin B12     LDL cholesterol direct     COLOGUARD([employer])   2. Hypothyroidism, unspecified type  E03.9 TSH with free T4 reflex  Plan labs today and adjust synthroid accordingly     3. Vegetarian diet  Z78.9 CBC with platelets     Vitamin B12   4. Vitamin D deficiency  E55.9 Vitamin D Deficiency   5. Colon cancer screening  Z12.11 Plan Cologuard - patient is average risk   6. Cervical cancer screening  Z12.4 Pap screen with HPV - recommended age 30 - 65 years   7. 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    Weight as of this encounter: 59.9 kg (132 lb).    Weight management plan: Discussed healthy diet and exercise guidelines    She reports that she has never smoked. She has never used smokeless tobacco.      Counseling Resources:  ATP IV Guidelines  Pooled Cohorts Equation Calculator  Breast Cancer Risk Calculator  BRCA-Related Cancer Risk Assessment: FHS-7 Tool  FRAX Risk Assessment  ICSI Preventive Guidelines  Dietary Guidelines for Americans, 2010  USDA's MyPlate  ASA Prophylaxis  Lung CA Screening    Nyasia Wise MD  [facility] RASHID  "

## 2022-03-08 NOTE — PATIENT INSTRUCTIONS
Labs today    We'll send a cologuard test to your home for colon cancer screening - please mail back    Pap smear today    Expect a call to schedule with the concussion neurology team - please alert me if you haven't heard from them in 3 business days    Have a fabulous trip to [place]!        Preventive Health Recommendations  Female Ages 40 to 49    Yearly exam:     See your health care provider every year in order to  1. Review health changes.   2. Discuss preventive care.    3. Review your medicines if your doctor prescribed any.      Get a Pap test every three years (unless you have an abnormal result and your provider advises testing more often).      If you get Pap tests with HPV test, you only need to test every 5 years, unless you have an abnormal result. You do not need a Pap test if your uterus was removed (hysterectomy) and you have not had cancer.      You should be tested each year for STDs (sexually transmitted diseases), if you're at risk.     Ask your doctor if you should have a mammogram.      Have a colonoscopy (test for colon cancer) if someone in your family has had colon cancer or polyps before age 50.       Have a cholesterol test every 5 years.       Have a diabetes test (fasting glucose) after age 45. If you are at risk for diabetes, you should have this test every 3 years.    Shots: Get a flu shot each year. Get a tetanus shot every 10 years.     Nutrition:     Eat at least 5 servings of fruits and vegetables each day.    Eat whole-grain bread, whole-wheat pasta and brown rice instead of white grains and rice.    Get adequate Calcium and Vitamin D.      Lifestyle    Exercise at least 150 minutes a week (an average of 30 minutes a day, 5 days a week). This will help you control your weight and prevent disease.    Limit alcohol to one drink per day.    No smoking.     Wear sunscreen to prevent skin cancer.    See your dentist every six months for an exam and cleaning.

## 2022-03-09 LAB
ANION GAP SERPL CALCULATED.3IONS-SCNC: 11 MMOL/L (ref 3–14)
BUN SERPL-MCNC: 11 MG/DL (ref 7–30)
CALCIUM SERPL-MCNC: 10 MG/DL (ref 8.5–10.1)
CHLORIDE BLD-SCNC: 105 MMOL/L (ref 94–109)
CO2 SERPL-SCNC: 21 MMOL/L (ref 20–32)
CREAT SERPL-MCNC: 0.7 MG/DL (ref 0.52–1.04)
DEPRECATED CALCIDIOL+CALCIFEROL SERPL-MC: 29 UG/L (ref 20–75)
GFR SERPL CREATININE-BSD FRML MDRD: >90 ML/MIN/1.73M2
GLUCOSE BLD-MCNC: 83 MG/DL (ref 70–99)
LDLC SERPL CALC-MCNC: 153 MG/DL
POTASSIUM BLD-SCNC: 3.9 MMOL/L (ref 3.4–5.3)
SODIUM SERPL-SCNC: 137 MMOL/L (ref 133–144)
TSH SERPL DL<=0.005 MIU/L-ACNC: 1.61 MU/L (ref 0.4–4)

## 2022-03-11 LAB
BKR LAB AP GYN ADEQUACY: NORMAL
BKR LAB AP GYN INTERPRETATION: NORMAL
BKR LAB AP HPV REFLEX: NORMAL
BKR LAB AP PREVIOUS ABNORMAL: NORMAL
PATH REPORT.COMMENTS IMP SPEC: NORMAL
PATH REPORT.COMMENTS IMP SPEC: NORMAL
PATH REPORT.RELEVANT HX SPEC: NORMAL

## 2022-03-11 RX ORDER — LEVOTHYROXINE SODIUM 125 MCG
125 TABLET ORAL DAILY
Qty: 90 TABLET | Refills: 3 | Status: SHIPPED | OUTPATIENT
Start: 2022-03-11 | End: 2023-03-31

## 2022-03-15 LAB
HUMAN PAPILLOMA VIRUS 16 DNA: NEGATIVE
HUMAN PAPILLOMA VIRUS 18 DNA: NEGATIVE
HUMAN PAPILLOMA VIRUS FINAL DIAGNOSIS: NORMAL
HUMAN PAPILLOMA VIRUS OTHER HR: NEGATIVE

## 2022-03-21 LAB — COLOGUARD-ABSTRACT: NEGATIVE

## 2022-05-11 ENCOUNTER — OFFICE VISIT (OUTPATIENT)
Dept: PODIATRY | Facility: CLINIC | Age: 52
End: 2022-05-11
Payer: COMMERCIAL

## 2022-05-11 VITALS — DIASTOLIC BLOOD PRESSURE: 84 MMHG | BODY MASS INDEX: 26.02 KG/M2 | SYSTOLIC BLOOD PRESSURE: 134 MMHG | WEIGHT: 126 LBS

## 2022-05-11 DIAGNOSIS — M79.672 LEFT FOOT PAIN: Primary | ICD-10-CM

## 2022-05-11 DIAGNOSIS — M76.821 POSTERIOR TIBIAL TENDONITIS, RIGHT: ICD-10-CM

## 2022-05-11 PROCEDURE — 99203 OFFICE O/P NEW LOW 30 MIN: CPT | Performed by: PODIATRIST

## 2022-05-11 RX ORDER — DICLOFENAC SODIUM 75 MG/1
75 TABLET, DELAYED RELEASE ORAL 2 TIMES DAILY
Qty: 28 TABLET | Refills: 0 | Status: SHIPPED | OUTPATIENT
Start: 2022-05-11 | End: 2022-06-13

## 2022-05-11 NOTE — LETTER
5/11/2022         RE: Swetha Flores  [address] [place] [address]        Dear Colleague,    Thank you for referring your patient, Swetha Flores, to the [facility]. Please see a copy of my visit note below.    PATIENT HISTORY:   Swetha Flores is a 51 year old female who presents to clinic for pain to the left heel.  Notes its been going on for about 5 weeks.  States it is kind of a burning pain.  It is intermittent but worse after long walks.  She has been soaking in hot water and massaging it which helps a little bit.  Denies specific injury.  Pain can be 5 out of 10 at its worst.  She is wondering what is causing the pain and what can be done for it.    Review of Systems:  Patient denies fever, chills, rash, wound, stiffness,  numbness, weakness, heart burn, blood in stool, chest pain with activity, calf pain when walking, shortness of breath with activity, chronic cough, easy bleeding/bruising, swelling of ankles, excessive thirst, fatigue, depression, anxiety.  Patient admits to limping at times.     PAST MEDICAL HISTORY:   Past Medical History:   Diagnosis Date     Ankle pain 9/1/2009     Anxiety 4/4/2011     Arthropathy, unspecified, site unspecified     has been to PT without much relief.     Cervicalgia 5/23/2007     Concussion 2017     Hyperlipidemia LDL goal <160 10/31/2010     Hypertension 2004    Only during my second pregnancy     JOINT PAIN-LOWER LEG 3/21/2007     JOINT PAIN-PELVIS 4/9/2008     Unspecified hypothyroidism         PAST SURGICAL HISTORY:   Past Surgical History:   Procedure Laterality Date     [facility] NONSPECIFIC PROCEDURE  2001, 2004    2 NSVDs         MEDICATIONS:   Current Outpatient Medications:      SYNTHROID 125 MCG tablet, Take 1 tablet (125 mcg) by mouth daily, Disp: 90 tablet, Rfl: 3     ALLERGIES:    Allergies   Allergen Reactions     Seasonal Allergies      Barley Grass Rash        SOCIAL HISTORY:   Social History     Socioeconomic History     Marital  status:      Spouse name: Not on file     Number of children: Not on file     Years of education: Not on file     Highest education level: Not on file   Occupational History     Not on file   Tobacco Use     Smoking status: Never Smoker     Smokeless tobacco: Never Used   Substance and Sexual Activity     Alcohol use: No     Drug use: No     Sexual activity: Yes     Partners: Male     Birth control/protection: Condom   Other Topics Concern     Parent/sibling w/ CABG, MI or angioplasty before 65F 55M? No   Social History Narrative            2 children - [facility]     Social Determinants of Health     Financial Resource Strain: Low Risk      Difficulty of Paying Living Expenses: Not hard at all   Food Insecurity: No Food Insecurity     Worried About Running Out of Food in the Last Year: Never true     Ran Out of Food in the Last Year: Never true   Transportation Needs: No Transportation Needs     Lack of Transportation (Medical): No     Lack of Transportation (Non-Medical): No   Physical Activity: Insufficiently Active     Days of Exercise per Week: 4 days     Minutes of Exercise per Session: 30 min   Stress: No Stress Concern Present     Feeling of Stress : Only a little   Social Connections: Unknown     Frequency of Communication with Friends and Family: More than three times a week     Frequency of Social Gatherings with Friends and Family: Once a week     Attends Denominational Services: Patient refused     Active Member of Clubs or Organizations: Yes     Attends Club or Organization Meetings: Not on file     Marital Status:    Intimate Partner Violence: Not on file   Housing Stability: Unknown     Unable to Pay for Housing in the Last Year: No     Number of Places Lived in the Last Year: Not on file     Unstable Housing in the Last Year: No        FAMILY HISTORY:   Family History   Problem Relation Age of Onset     Lipids Mother      Depression Mother      Thyroid  Disease Mother      Hyperlipidemia Mother      Asthma Mother      Arthritis Father      Cardiovascular Father      Asthma Son      Breast Cancer No family hx of      Ovarian Cancer No family hx of         EXAM:Vitals: /84   Wt 57.2 kg (126 lb)   BMI 26.02 kg/m    BMI= Body mass index is 26.02 kg/m .      General appearance: Patient is alert and fully cooperative with history & exam.  No sign of distress is noted during the visit.     Psychiatric: Affect is pleasant & appropriate.  Patient appears motivated to improve health.     Respiratory: Breathing is regular & unlabored while sitting.     HEENT: Hearing is intact to spoken word.  Speech is clear.  No gross evidence of visual impairment that would impact ambulation.     Dermatologic: Skin is intact to both lower extremities without significant lesions, rash or abrasion.  No paronychia or evidence of soft tissue infection is noted.     Vascular: DP & PT pulses are intact & regular bilaterally.  No significant edema or varicosities noted.  CFT and skin temperature is normal to both lower extremities.     Neurologic: Lower extremity sensation is intact to light touch.  No evidence of weakness or contracture in the lower extremities.  No evidence of neuropathy.     Musculoskeletal: Patient is ambulatory without assistive device or brace.  Increased arch height.       ASSESSMENT:    Left foot pain  Posterior tibial tendonitis, right     Medical Decision Making/Plan:  [facility] Reviewed and discussed causes of tendonitis.  We discussed treatments such as immobiliation, icing, stretching, heel lifts, orthotics, physical therapy, MRI.     At this time recommend an ankle brace for 6 weeks.  After this recommend transitioning to shoes and inserts.  She was given stretches to do.  Recommend topical pain cream and was given a prescription for some oral anti-inflammatories for short course.  We will have her not go barefoot or in socks and have  a supportive shoe or sandal on even around the house.  If pain does not improve may recommend physical therapy, a boot for an MRI of the ankle.  All questions were answered to patient satisfaction she will call for the questions or concerns.    Patient risk factor: Patient is at low risk for infection.        Sangita Toussaint DPM, Podiatry/Foot and Ankle Surgery        Again, thank you for allowing me to participate in the care of your patient.        Sincerely,        Sangita Toussaint DPM, Podiatry/Foot and Ankle Surgery

## 2022-05-11 NOTE — PROGRESS NOTES
PATIENT HISTORY:   Swetha Flores is a 51 year old female who presents to clinic for pain to the left heel.  Notes its been going on for about 5 weeks.  States it is kind of a burning pain.  It is intermittent but worse after long walks.  She has been soaking in hot water and massaging it which helps a little bit.  Denies specific injury.  Pain can be 5 out of 10 at its worst.  She is wondering what is causing the pain and what can be done for it.    Review of Systems:  Patient denies fever, chills, rash, wound, stiffness,  numbness, weakness, heart burn, blood in stool, chest pain with activity, calf pain when walking, shortness of breath with activity, chronic cough, easy bleeding/bruising, swelling of ankles, excessive thirst, fatigue, depression, anxiety.  Patient admits to limping at times.     PAST MEDICAL HISTORY:   Past Medical History:   Diagnosis Date     Ankle pain 9/1/2009     Anxiety 4/4/2011     Arthropathy, unspecified, site unspecified     has been to PT without much relief.     Cervicalgia 5/23/2007     Concussion 2017     Hyperlipidemia LDL goal <160 10/31/2010     Hypertension 2004    Only during my second pregnancy     JOINT PAIN-LOWER LEG 3/21/2007     JOINT PAIN-PELVIS 4/9/2008     Unspecified hypothyroidism         PAST SURGICAL HISTORY:   Past Surgical History:   Procedure Laterality Date     [facility] NONSPECIFIC PROCEDURE  2001, 2004    2 NSVDs         MEDICATIONS:   Current Outpatient Medications:      SYNTHROID 125 MCG tablet, Take 1 tablet (125 mcg) by mouth daily, Disp: 90 tablet, Rfl: 3     ALLERGIES:    Allergies   Allergen Reactions     Seasonal Allergies      Barley Grass Rash        SOCIAL HISTORY:   Social History     Socioeconomic History     Marital status:      Spouse name: Not on file     Number of children: Not on file     Years of education: Not on file     Highest education level: Not on file   Occupational History     Not on file   Tobacco Use     Smoking status: Never Smoker      Smokeless tobacco: Never Used   Substance and Sexual Activity     Alcohol use: No     Drug use: No     Sexual activity: Yes     Partners: Male     Birth control/protection: Condom   Other Topics Concern     Parent/sibling w/ CABG, MI or angioplasty before 65F 55M? No   Social History Narrative            2 children - [facility]     Social Determinants of Health     Financial Resource Strain: Low Risk      Difficulty of Paying Living Expenses: Not hard at all   Food Insecurity: No Food Insecurity     Worried About Running Out of Food in the Last Year: Never true     Ran Out of Food in the Last Year: Never true   Transportation Needs: No Transportation Needs     Lack of Transportation (Medical): No     Lack of Transportation (Non-Medical): No   Physical Activity: Insufficiently Active     Days of Exercise per Week: 4 days     Minutes of Exercise per Session: 30 min   Stress: No Stress Concern Present     Feeling of Stress : Only a little   Social Connections: Unknown     Frequency of Communication with Friends and Family: More than three times a week     Frequency of Social Gatherings with Friends and Family: Once a week     Attends Congregation Services: Patient refused     Active Member of Clubs or Organizations: Yes     Attends Club or Organization Meetings: Not on file     Marital Status:    Intimate Partner Violence: Not on file   Housing Stability: Unknown     Unable to Pay for Housing in the Last Year: No     Number of Places Lived in the Last Year: Not on file     Unstable Housing in the Last Year: No        FAMILY HISTORY:   Family History   Problem Relation Age of Onset     Lipids Mother      Depression Mother      Thyroid Disease Mother      Hyperlipidemia Mother      Asthma Mother      Arthritis Father      Cardiovascular Father      Asthma Son      Breast Cancer No family hx of      Ovarian Cancer No family hx of         EXAM:Vitals: /84   Wt 57.2 kg (126  lb)   BMI 26.02 kg/m    BMI= Body mass index is 26.02 kg/m .      General appearance: Patient is alert and fully cooperative with history & exam.  No sign of distress is noted during the visit.     Psychiatric: Affect is pleasant & appropriate.  Patient appears motivated to improve health.     Respiratory: Breathing is regular & unlabored while sitting.     HEENT: Hearing is intact to spoken word.  Speech is clear.  No gross evidence of visual impairment that would impact ambulation.     Dermatologic: Skin is intact to both lower extremities without significant lesions, rash or abrasion.  No paronychia or evidence of soft tissue infection is noted.     Vascular: DP & PT pulses are intact & regular bilaterally.  No significant edema or varicosities noted.  CFT and skin temperature is normal to both lower extremities.     Neurologic: Lower extremity sensation is intact to light touch.  No evidence of weakness or contracture in the lower extremities.  No evidence of neuropathy.     Musculoskeletal: Patient is ambulatory without assistive device or brace.  Increased arch height.       ASSESSMENT:    Left foot pain  Posterior tibial tendonitis, right     Medical Decision Making/Plan:  Reviewed patient[facility] in [facility]. Reviewed and discussed causes of tendonitis.  We discussed treatments such as immobiliation, icing, stretching, heel lifts, orthotics, physical therapy, MRI.     At this time recommend an ankle brace for 6 weeks.  After this recommend transitioning to shoes and inserts.  She was given stretches to do.  Recommend topical pain cream and was given a prescription for some oral anti-inflammatories for short course.  We will have her not go barefoot or in socks and have a supportive shoe or sandal on even around the house.  If pain does not improve may recommend physical therapy, a boot for an MRI of the ankle.  All questions were answered to patient satisfaction she will call for the questions or  concerns.    Patient risk factor: Patient is at low risk for infection.        Sangita Toussaint DPM, Podiatry/Foot and Ankle Surgery

## 2022-06-02 ENCOUNTER — TELEPHONE (OUTPATIENT)
Dept: PHYSICAL MEDICINE AND REHAB | Facility: CLINIC | Age: 52
End: 2022-06-02
Payer: COMMERCIAL

## 2022-06-02 NOTE — TELEPHONE ENCOUNTER
Left detailed message for pt to call back and confirm of the appointment  time change for 6-13-22, time changed from 3:00pm to 11:00am.    Asked pt. To call back and confirm    Thanksstiven

## 2022-06-13 ENCOUNTER — OFFICE VISIT (OUTPATIENT)
Dept: PHYSICAL MEDICINE AND REHAB | Facility: CLINIC | Age: 52
End: 2022-06-13
Payer: COMMERCIAL

## 2022-06-13 VITALS — OXYGEN SATURATION: 96 % | HEART RATE: 72 BPM | DIASTOLIC BLOOD PRESSURE: 85 MMHG | SYSTOLIC BLOOD PRESSURE: 125 MMHG

## 2022-06-13 DIAGNOSIS — R42 DIZZINESS: ICD-10-CM

## 2022-06-13 DIAGNOSIS — R53.83 FATIGUE DUE TO OLD HEAD INJURY: Primary | ICD-10-CM

## 2022-06-13 DIAGNOSIS — G47.9 SLEEP DISTURBANCE: ICD-10-CM

## 2022-06-13 DIAGNOSIS — S09.90XS FATIGUE DUE TO OLD HEAD INJURY: Primary | ICD-10-CM

## 2022-06-13 DIAGNOSIS — Z87.820 HISTORY OF CONCUSSION: ICD-10-CM

## 2022-06-13 PROCEDURE — 99204 OFFICE O/P NEW MOD 45 MIN: CPT | Performed by: PHYSICAL MEDICINE & REHABILITATION

## 2022-06-13 ASSESSMENT — ANXIETY QUESTIONNAIRES
3. WORRYING TOO MUCH ABOUT DIFFERENT THINGS: NOT AT ALL
GAD7 TOTAL SCORE: 0
7. FEELING AFRAID AS IF SOMETHING AWFUL MIGHT HAPPEN: NOT AT ALL
GAD7 TOTAL SCORE: 0
6. BECOMING EASILY ANNOYED OR IRRITABLE: NOT AT ALL
2. NOT BEING ABLE TO STOP OR CONTROL WORRYING: NOT AT ALL
5. BEING SO RESTLESS THAT IT IS HARD TO SIT STILL: NOT AT ALL
1. FEELING NERVOUS, ANXIOUS, OR ON EDGE: NOT AT ALL

## 2022-06-13 ASSESSMENT — PATIENT HEALTH QUESTIONNAIRE - PHQ9: 5. POOR APPETITE OR OVEREATING: NOT AT ALL

## 2022-06-13 NOTE — PATIENT INSTRUCTIONS
"    GENERAL ADVICE:  ~ Gradually ease back into your usual activities.   ~ Rest as needed to help your symptoms go away.  - Consider pairing 50 minutes of activity with 10 minutes of rest.  ~ Allow yourself more time for activities.  ~ Write things down.  At home, at work, whenever there is something that you should remember, even it is simple.  SCREENS:  ~ Change settings on your phone and computer using the \"Blue Light Filter\" (Night Shift on all  Apple products)  ~ The goal is making screens more yellow and less blue.    ~ If this is not an option you can download this program, Basic6, to adjust your screen resolution.  ~ [employer] for various filter and font apps  ~ Turn screen brightness down  ~ Increase font size  ~ Limit screen activities including computer, TV and phones.  NECK PAIN:  ~ Ice or Heat are good~  ~ Massage is ok if it doesn't trigger more symptoms~  ~ Gentle stretches and range-of-motion are helpful.  DIZZINESS:  ~ No driving when dizzy.  ~ No biking, climbing heights or ladders if dizzy.  FATIGUE:  ~ Daily exercise is strongly encouraged.  Start with a 10 min walk and increase the time as tolerated until you are walking 30 minutes per day.    ~ Focus on Good sleep hygiene instead of napping . Your goal should be 8 hours of sleep every night.  ~ Try Melatonin 1 hour before bed  ANXIETY OR MOOD SWINGS:  ~ If you are irritable or anxious, take a break in a quiet room.  ~ Try using the free Calm nigel for guided breathing and mindfulness/meditation.  ~ Explore Buysight (https://www.headspace.com) for free and easy-to-use meditation guidance.  LIGHT SENSITIVITIES:  ~ Avoid florescent lighting when possible.  ~Yellow or lupe tinted lenses may help reduce computer or night-time road glare.             ~ Amazon has a $10.00 option: [employer]s yellow Night Vision.  NOISE SENSITIVITIES:  ~ Try listening to calming sounds such as the \"Calm Nigel\" to help shift your focus off of more irritating " sounds.  ~ Avoid crowded areas at first then slowly introduce yourself to small increments of crowded, noisy areas.  ~ Try High fidelity earplugs used by Musicians. Etymotic ETY-Plugs, can be found on Amazon for $13.00.  DIET:  - In principle incorporate more protein, lots of veggies, some fruit, whole grains.    - Less sweets and saturated fat.   - Mediterranean Diet is an easy-to-follow example.  ~ Drink plenty of water throughout the day (8-10 glasses per day)    [facility]   Phone # [phone]  Fax # [phone]  Scheduling; # [phone]      Thank you for allowing us to be a part of your care.

## 2022-06-13 NOTE — PROGRESS NOTES
[facility] Note     Patient Name: Swetha Flores : 1970 Medical Record: [id number]     Requesting Physician/clinician: No att. providers found           History of Present Illness:     Swetha Flores is a 51 year old female who suffered a concussion in 2017 the patient fell on ice hitting the right side of her head without associated loss of consciousness.      She experienced moderate right-sided headache, severe vertigo, nausea, vomiting, difficulty concentrating, and fatigue.  Went to ED.  Her head CT was unrevealing.  Symptoms completely resolved within the next 4-5 weeks.  Overall she has been doing quite well; however with certain positions and exertion  she gets dizziness like or feels wave of concussion symptoms at times, very infrequently now.            Symptoms  CONCUSSION SYMPTOMS ASSESSMENT 2017   Headache or Pressure In Head 3 - moderate 0 - none   Upset Stomach or Throwing Up 0 - none 0 - none   Problems with Balance 1 - mild 0 - none   Feeling Dizzy 2 - mild to moderate 0 - none   Sensitivity to Light 0 - none 0 - none   Sensitivity to Noise 3 - moderate 0 - none   Mood Changes 0 - none 0 - none   Feeling sluggish, hazy, or foggy 2 - mild to moderate 0 - none   Trouble Concentrating, Lack of Focus 2 - mild to moderate 0 - none   Motion Sickness 2 - mild to moderate 2 - mild to moderate   Vision Changes 0 - none 0 - none   Memory Problems 0 - none 0 - none   Feeling Confused 0 - none 0 - none   Neck Pain 1 - mild 0 - none   Trouble Sleeping 1 - mild 2 - mild to moderate   Total Number of Symptoms 9 2   Symptom Severity Score 17 4       Current:  Certain inversion positions for Yoga trigger headaches.  R side temple like.  Also sometimes with biking, gets symtoms like, cross country skiing as well, and towards the end, felt concussion like symtoms.  Poor sleeper.  Is seeing osme one for sleep apnea.  Denies issues with bowel or bladder.       Headache History:      Onset  History:  17    Current Headache Pattern:      Frequency (How many headache days per month?):  1-2     Duration of Headache:  24-72 hours     Aura: none     Associated Symptoms:  nausea and light sensitivity       Description of Headache Pain & Location:  dull pain, bilateral in the temporal area      Level of Pain as headache is startin/10      Level of Pain during usual headache:  3/10      Level of Pain during the worst headache:  5/10      Do headaches interfere with or prevent usual activities or diminish your productivity at home or work?  Yes - activities like yoga, hard to distinguish between migraines and concussion symptoms.      Treatments Tried:  Rest, tylenol  Physical therapy    Have you needed to utilize the Emergency Room to treat your headache symptoms?  If so, how often and when was the last time used:  No    Are Headaches worsening over time?  Yes, stays longer    What makes your headaches better?  quiet room and Tylenol    What makes your headaches worse or triggers your headaches?   Environment: none   Food: none   Stress: exercise and fatigue               Past Medical and Surgical History:     Past Medical History:   Diagnosis Date     Ankle pain 2009     Anxiety 2011     Arthropathy, unspecified, site unspecified     has been to PT without much relief.     Cervicalgia 2007     Concussion 2017     Hyperlipidemia LDL goal <160 10/31/2010     Hypertension 2004    Only during my second pregnancy     JOINT PAIN-LOWER LEG 3/21/2007     JOINT PAIN-PELVIS 2008     Unspecified hypothyroidism      Past Surgical History:   Procedure Laterality Date     [facility] NONSPECIFIC PROCEDURE  ,     2 NSVDs             Social History:     Social History     Tobacco Use     Smoking status: Never Smoker     Smokeless tobacco: Never Used   Substance Use Topics     Alcohol use: No     Living situation: house  Family support: yes   Vocational History:  Teach management  Recreational drug  "use: none          Functional history:     Swetha Flores is independent with all aspects of life.    ADLs: I   Assistive devices: none   iADLs (medication management and finances): I  Hand dominance: R  Driving: yes            Family History:     Family History   Problem Relation Age of Onset     Lipids Mother      Depression Mother      Thyroid Disease Mother      Hyperlipidemia Mother      Asthma Mother      Arthritis Father      Cardiovascular Father      Asthma Son      Breast Cancer No family hx of      Ovarian Cancer No family hx of             Medications:     Current Outpatient Medications   Medication Sig Dispense Refill     SYNTHROID 125 MCG tablet Take 1 tablet (125 mcg) by mouth daily 90 tablet 3            Allergies:     Allergies   Allergen Reactions     Seasonal Allergies      Barley Grass Rash              ROS:        ROS: 10 point ROS neg other than the symptoms noted above in the HPI.             Physical Examiniation:     VITAL SIGNS: /85   Pulse 72   SpO2 96%   BMI: Estimated body mass index is 26.02 kg/m  as calculated from the following:    Height as of 3/8/22: 1.482 m (4' 10.35\").    Weight as of 5/11/22: 57.2 kg (126 lb).    Gen: NAD, pleasant and cooperative   HEENT: NCAT, EOMI, no nystagmus, MICHAEL, there is no reproducible headache and eye strain with VOMS. No taut or tender cervical paraspinal muscles, no facial asymmetry   Cardio: 2+ radial pulse, well perfused  Pulm: non-labored breathing in room air, symmetrical chest rise  Abd: benign  Ext: WWP, no edema in BLE, no tenderness in calves  Neuro/MSK: 5/5 in c5-t1 and l2-s1 myotomes, SILT, negative marmolejo's b/l, CN 2-12 intact, negative romberg, AAOx3.  GAIT: WNFL               Laboratory/Imaging:     CT SCAN OF THE HEAD WITHOUT CONTRAST   1/17/2017 5:08 PM      HISTORY: Head injury, vertigo, nausea     TECHNIQUE: Axial images of the head and coronal reformations without  IV contrast material. Radiation dose for this scan was reduced " using  automated exposure control, adjustment of the mA and/or kV according  to patient size, or iterative reconstruction technique.     COMPARISON: None     FINDINGS: There is a small benign-appearing calcification in the  anterior left frontal lobe without any surrounding edema or mass  effect. Brain parenchyma is otherwise normal. Ventricles and  subarachnoid spaces are normal. There is no evidence for intracranial  hemorrhage, mass effect, acute infarct, or skull fracture.                                                                      IMPRESSION:  1. Small benign-appearing calcification in the left frontal lobe.  2. No evidence for intracranial hemorrhage, acute infarct, or any  focal mass lesions.           Assessment/Plan:     Swetha was seen today for head injury.    Diagnoses and all orders for this visit:    Fatigue due to old head injury    Dizziness    History of concussion    Sleep disturbance            1. Patient education: In depth discussion and education was provided about the assessment and implications of each of the below recommendations for management. Patient indicated readiness to learn, all questions were answered and understanding of material presented was confirmed.    2. Work-up:  None     3. Therapy/equipment/braces:  No additions    4. Medications:  No additions     5. Interventions:  Discussed sleep hygiene and brain injury recovery and endurance.  Discussed progressive return to activity as well as exercise and brain health.  Discussed Verilux Light therapy.     6. Referral / follow up with other providers:  PCP, Sleep specialist    7. Follow up:  6 months or as needed for progress.     Xiang Escalante, DO  Physical Medicine & Rehabilitation    I spent a total of 45 minutes face-to-face with Swetha Flores during today's office visit. Over 50% of this time was spent counseling the patient and/or coordinating care. See note for details.       45 minutes spent on the date of the  encounter doing chart review, history and exam, documentation and further activities as noted above

## 2022-06-13 NOTE — NURSING NOTE
Chief Complaint   Patient presents with     Head Injury     Concussion w/o LOC on 1/17/17 - fall on ice       Vitals:    06/13/22 1057   BP: 125/85   Pulse: 72   SpO2: 96%       There is no height or weight on file to calculate BMI.      WOUND EVALUATION:

## 2022-06-13 NOTE — LETTER
2022       RE: Swetha Flores  [address] [place] [address]     Dear Colleague,    Thank you for referring your patient, Swetha Flores, to the [facility] [place] [facility]. Please see a copy of my visit note below.           [facility] Note     Patient Name: Swetha Flores : 1970 Medical Record: [id number]     Requesting Physician/clinician: No att. providers found           History of Present Illness:     Swetha Flores is a 51 year old female who suffered a concussion in 2017 the patient fell on ice hitting the right side of her head without associated loss of consciousness.      She experienced moderate right-sided headache, severe vertigo, nausea, vomiting, difficulty concentrating, and fatigue.  Went to ED.  Her head CT was unrevealing.  Symptoms completely resolved within the next 4-5 weeks.  Overall she has been doing quite well; however with certain positions and exertion  she gets dizziness like or feels wave of concussion symptoms at times, very infrequently now.            Symptoms  CONCUSSION SYMPTOMS ASSESSMENT 2017   Headache or Pressure In Head 3 - moderate 0 - none   Upset Stomach or Throwing Up 0 - none 0 - none   Problems with Balance 1 - mild 0 - none   Feeling Dizzy 2 - mild to moderate 0 - none   Sensitivity to Light 0 - none 0 - none   Sensitivity to Noise 3 - moderate 0 - none   Mood Changes 0 - none 0 - none   Feeling sluggish, hazy, or foggy 2 - mild to moderate 0 - none   Trouble Concentrating, Lack of Focus 2 - mild to moderate 0 - none   Motion Sickness 2 - mild to moderate 2 - mild to moderate   Vision Changes 0 - none 0 - none   Memory Problems 0 - none 0 - none   Feeling Confused 0 - none 0 - none   Neck Pain 1 - mild 0 - none   Trouble Sleeping 1 - mild 2 - mild to moderate   Total Number of Symptoms 9 2   Symptom Severity Score 17 4       Current:  Certain  inversion positions for Yoga trigger headaches.  R side temple like.  Also sometimes with biking, gets symtoms like, cross country skiing as well, and towards the end, felt concussion like symtoms.  Poor sleeper.  Is seeing osme one for sleep apnea.  Denies issues with bowel or bladder.       Headache History:      Onset History:  17    Current Headache Pattern:      Frequency (How many headache days per month?):  1-2     Duration of Headache:  24-72 hours     Aura: none     Associated Symptoms:  nausea and light sensitivity       Description of Headache Pain & Location:  dull pain, bilateral in the temporal area      Level of Pain as headache is startin/10      Level of Pain during usual headache:  3/10      Level of Pain during the worst headache:  5/10      Do headaches interfere with or prevent usual activities or diminish your productivity at home or work?  Yes - activities like yoga, hard to distinguish between migraines and concussion symptoms.      Treatments Tried:  Rest, tylenol  Physical therapy    Have you needed to utilize the Emergency Room to treat your headache symptoms?  If so, how often and when was the last time used:  No    Are Headaches worsening over time?  Yes, stays longer    What makes your headaches better?  quiet room and Tylenol    What makes your headaches worse or triggers your headaches?   Environment: none   Food: none   Stress: exercise and fatigue               Past Medical and Surgical History:     Past Medical History:   Diagnosis Date     Ankle pain 2009     Anxiety 2011     Arthropathy, unspecified, site unspecified     has been to PT without much relief.     Cervicalgia 2007     Concussion 2017     Hyperlipidemia LDL goal <160 10/31/2010     Hypertension 2004    Only during my second pregnancy     JOINT PAIN-LOWER LEG 3/21/2007     JOINT PAIN-PELVIS 2008     Unspecified hypothyroidism      Past Surgical History:   Procedure Laterality Date     [facility]  "NONSPECIFIC PROCEDURE  2001, 2004    2 NSVDs             Social History:     Social History     Tobacco Use     Smoking status: Never Smoker     Smokeless tobacco: Never Used   Substance Use Topics     Alcohol use: No     Living situation: house  Family support: yes   Vocational History:  Teach management  Recreational drug use: none          Functional history:     Swetha Flores is independent with all aspects of life.    ADLs: I   Assistive devices: none   iADLs (medication management and finances): I  Hand dominance: R  Driving: yes            Family History:     Family History   Problem Relation Age of Onset     Lipids Mother      Depression Mother      Thyroid Disease Mother      Hyperlipidemia Mother      Asthma Mother      Arthritis Father      Cardiovascular Father      Asthma Son      Breast Cancer No family hx of      Ovarian Cancer No family hx of             Medications:     Current Outpatient Medications   Medication Sig Dispense Refill     SYNTHROID 125 MCG tablet Take 1 tablet (125 mcg) by mouth daily 90 tablet 3            Allergies:     Allergies   Allergen Reactions     Seasonal Allergies      Barley Grass Rash              ROS:        ROS: 10 point ROS neg other than the symptoms noted above in the HPI.             Physical Examiniation:     VITAL SIGNS: /85   Pulse 72   SpO2 96%   BMI: Estimated body mass index is 26.02 kg/m  as calculated from the following:    Height as of 3/8/22: 1.482 m (4' 10.35\").    Weight as of 5/11/22: 57.2 kg (126 lb).    Gen: NAD, pleasant and cooperative   HEENT: NCAT, EOMI, no nystagmus, MICHAEL, there is no reproducible headache and eye strain with VOMS. No taut or tender cervical paraspinal muscles, no facial asymmetry   Cardio: 2+ radial pulse, well perfused  Pulm: non-labored breathing in room air, symmetrical chest rise  Abd: benign  Ext: WWP, no edema in BLE, no tenderness in calves  Neuro/MSK: 5/5 in c5-t1 and l2-s1 myotomes, SILT, negative marmolejo's b/l, CN " 2-12 intact, negative romberg, AAOx3.  GAIT: WNFL               Laboratory/Imaging:     CT SCAN OF THE HEAD WITHOUT CONTRAST   1/17/2017 5:08 PM      HISTORY: Head injury, vertigo, nausea     TECHNIQUE: Axial images of the head and coronal reformations without  IV contrast material. Radiation dose for this scan was reduced using  automated exposure control, adjustment of the mA and/or kV according  to patient size, or iterative reconstruction technique.     COMPARISON: None     FINDINGS: There is a small benign-appearing calcification in the  anterior left frontal lobe without any surrounding edema or mass  effect. Brain parenchyma is otherwise normal. Ventricles and  subarachnoid spaces are normal. There is no evidence for intracranial  hemorrhage, mass effect, acute infarct, or skull fracture.                                                                      IMPRESSION:  1. Small benign-appearing calcification in the left frontal lobe.  2. No evidence for intracranial hemorrhage, acute infarct, or any  focal mass lesions.           Assessment/Plan:     Swetha was seen today for head injury.    Diagnoses and all orders for this visit:    Fatigue due to old head injury    Dizziness    History of concussion    Sleep disturbance            1. Patient education: In depth discussion and education was provided about the assessment and implications of each of the below recommendations for management. Patient indicated readiness to learn, all questions were answered and understanding of material presented was confirmed.    2. Work-up:  None     3. Therapy/equipment/braces:  No additions    4. Medications:  No additions     5. Interventions:  Discussed sleep hygiene and brain injury recovery and endurance.  Discussed progressive return to activity as well as exercise and brain health.  Discussed Verilux Light therapy.     6. Referral / follow up with other providers:  PCP, Sleep specialist    7. Follow up:  6 months or as  needed for progress.       Xiang Escalante, DO  Physical Medicine & Rehabilitation    I spent a total of 45 minutes face-to-face with Swetha Flores during today's office visit. Over 50% of this time was spent counseling the patient and/or coordinating care. See note for details.       45 minutes spent on the date of the encounter doing chart review, history and exam, documentation and further activities as noted above

## 2022-08-26 ENCOUNTER — HOSPITAL ENCOUNTER (OUTPATIENT)
Dept: MRI IMAGING | Facility: CLINIC | Age: 52
Discharge: HOME | End: 2022-08-26
Attending: PODIATRIST | Admitting: PODIATRIST
Payer: COMMERCIAL

## 2022-08-26 DIAGNOSIS — G89.29 CHRONIC PAIN OF LEFT ANKLE: ICD-10-CM

## 2022-08-26 DIAGNOSIS — M76.822 POSTERIOR TIBIAL TENDONITIS, LEFT: ICD-10-CM

## 2022-08-26 DIAGNOSIS — M25.572 CHRONIC PAIN OF LEFT ANKLE: ICD-10-CM

## 2022-08-26 PROCEDURE — 73721 MRI JNT OF LWR EXTRE W/O DYE: CPT | Mod: 26 | Performed by: RADIOLOGY

## 2022-08-26 PROCEDURE — 73721 MRI JNT OF LWR EXTRE W/O DYE: CPT | Mod: LT

## 2022-08-29 ENCOUNTER — TELEPHONE (OUTPATIENT)
Dept: PODIATRY | Facility: CLINIC | Age: 52
End: 2022-08-29

## 2022-08-29 NOTE — TELEPHONE ENCOUNTER
MRI is negative for tendon tear. No fractures or ligament tears. It is mostly negative. Does show inflammation at the heel which likely indicates some type of systemic arthritis such as gout, rheumatoid or reactive.....   systemic arthritis is worked up and treated with your primary care doctor or a rhuematologist with oral medication.   I recommend follow ing up with your primary care doctor for a work up as I do not do this and there is no treatable musculoskelatal condition based on xray.     Please let patient know.     Thanks,     Sangita Toussaint DPM

## 2022-08-31 ENCOUNTER — MYC MEDICAL ADVICE (OUTPATIENT)
Dept: PEDIATRICS | Facility: CLINIC | Age: 52
End: 2022-08-31

## 2022-08-31 DIAGNOSIS — M85.80 BONE EROSION: Primary | ICD-10-CM

## 2022-08-31 DIAGNOSIS — M19.90 INFLAMMATORY ARTHRITIS: ICD-10-CM

## 2022-10-19 ENCOUNTER — THERAPY VISIT (OUTPATIENT)
Dept: PHYSICAL THERAPY | Facility: CLINIC | Age: 52
End: 2022-10-19
Payer: COMMERCIAL

## 2022-10-19 DIAGNOSIS — M25.572 PAIN IN JOINT INVOLVING ANKLE AND FOOT, LEFT: Primary | ICD-10-CM

## 2022-10-19 PROCEDURE — 97110 THERAPEUTIC EXERCISES: CPT | Mod: GP | Performed by: PHYSICAL THERAPIST

## 2022-10-19 PROCEDURE — 97140 MANUAL THERAPY 1/> REGIONS: CPT | Mod: GP | Performed by: PHYSICAL THERAPIST

## 2022-10-19 PROCEDURE — 97161 PT EVAL LOW COMPLEX 20 MIN: CPT | Mod: GP | Performed by: PHYSICAL THERAPIST

## 2022-10-19 NOTE — PROGRESS NOTES
Physical Therapy Initial Evaluation  Subjective:  The history is provided by the patient. No  was used.   Patient Health History  Swetha Flores being seen for L ankle pain/foot.     Problem began: 6/1/2022.   Problem occurred: Insidious onset, over the past few years pt has noticed tightness in L leg   Pain is reported as 3/10 on pain scale.  General health as reported by patient is good.                  Current occupation is .   Primary job tasks include:  Computer work.                  Therapist Generated HPI Evaluation  Problem details: Pt notes insidious onset and gradual progression of L lower leg tightness and pain.  Pt notes pain anywhere from calf to heel of L foot, and it varies.      Pain starts after 1 mile of walking or even less.  Pain stays for the rest of the day or more depending on how hard she pushes it.  Pt has tried stretching without help.  .         Type of problem:  Left ankle.          Patient reports pain:  Posterior and sub calcaneus.  Pain is described as aching and is intermittent.  Pain radiates to:  No radiation. Pain is the same all the time.  Since onset symptoms are gradually worsening.  Associated symptoms:  Loss of strength and loss of motion/stiffness. Symptoms are exacerbated by ascending stairs, descending stairs, bending/squatting and walking  and relieved by rest.      Restrictions due to condition include:  Working in normal job without restrictions.  Barriers include:  None as reported by patient.                        Objective:    Gait:    Gait Type:  Antalgic               Ankle/Foot Evaluation  ROM:    AROM:    Dorsiflexion:  Left:   15  Right:   15  Plantarflexion:  Left:  45    Right:  55  Inversion:  Left:  Wnl     Right:  Wnl  Eversion:  Wnl     Right:  Wnl        Strength:    Dorsiflexion:  Left: 5-/5     Pain:   Right: 5/5   Pain:  Plantarflexion: Left: 4+/5   Pain:   Right: 5/5  Pain:            Posterior Tibialis: Left: 4+/5   Pain:  Right: 5-/5  Pain:              PALPATION:   Left ankle tenderness present at:  achilles tendon and posterior tibialis  Right ankle tenderness present at:   posterior tibialis                                                        General     ROS    Assessment/Plan:    Patient is a 51 year old female with left side ankle complaints.    Patient has the following significant findings with corresponding treatment plan.                Diagnosis 1:  L ankle/foot pain      Pain -  hot/cold therapy, manual therapy, self management, education and home program  Decreased ROM/flexibility - manual therapy and therapeutic exercise  Decreased strength - therapeutic exercise and therapeutic activities  Impaired gait - gait training  Impaired muscle performance - neuro re-education  Decreased function - therapeutic activities    Therapy Evaluation Codes:   1) History comprised of:   Personal factors that impact the plan of care:      None.    Comorbidity factors that impact the plan of care are:      None.     Medications impacting care: None.  2) Examination of Body Systems comprised of:   Body structures and functions that impact the plan of care:      Ankle.   Activity limitations that impact the plan of care are:      Squatting/kneeling and Walking.  3) Clinical presentation characteristics are:   Evolving/Changing.  4) Decision-Making    Low complexity using standardized patient assessment instrument and/or measureable assessment of functional outcome.  Cumulative Therapy Evaluation is: Low complexity.    Previous and current functional limitations:  (See Goal Flow Sheet for this information)    Short term and Long term goals: (See Goal Flow Sheet for this information)     Communication ability:  Patient appears to be able to clearly communicate and understand verbal and written communication and follow directions correctly.  Treatment Explanation - The following has been discussed with the patient:   RX ordered/plan  of care  Anticipated outcomes  Possible risks and side effects  This patient would benefit from PT intervention to resume normal activities.   Rehab potential is good.    Frequency:  1 X week, once daily  Duration:  for 6 weeks  Discharge Plan:  Achieve all LTG.  Independent in home treatment program.  Reach maximal therapeutic benefit.    Please refer to the daily flowsheet for treatment today, total treatment time and time spent performing 1:1 timed codes.

## 2022-10-24 ENCOUNTER — MYC MEDICAL ADVICE (OUTPATIENT)
Dept: PEDIATRICS | Facility: CLINIC | Age: 52
End: 2022-10-24

## 2022-10-24 DIAGNOSIS — G47.9 SLEEP DIFFICULTIES: Primary | ICD-10-CM

## 2022-10-24 NOTE — TELEPHONE ENCOUNTER
Dr. Wise,    Please see MC message from pt and advise  Sleep  pended    Andrei Jensen RN on 10/24/2022 at 5:00 PM

## 2022-11-04 ENCOUNTER — THERAPY VISIT (OUTPATIENT)
Dept: PHYSICAL THERAPY | Facility: CLINIC | Age: 52
End: 2022-11-04
Payer: COMMERCIAL

## 2022-11-04 DIAGNOSIS — M25.572 PAIN IN JOINT INVOLVING ANKLE AND FOOT, LEFT: Primary | ICD-10-CM

## 2022-11-04 PROCEDURE — 97110 THERAPEUTIC EXERCISES: CPT | Mod: GP | Performed by: PHYSICAL THERAPIST

## 2022-11-04 PROCEDURE — 97140 MANUAL THERAPY 1/> REGIONS: CPT | Mod: GP | Performed by: PHYSICAL THERAPIST

## 2022-11-15 ENCOUNTER — TRANSFERRED RECORDS (OUTPATIENT)
Dept: HEALTH INFORMATION MANAGEMENT | Facility: CLINIC | Age: 52
End: 2022-11-15

## 2022-11-20 ENCOUNTER — HEALTH MAINTENANCE LETTER (OUTPATIENT)
Age: 52
End: 2022-11-20

## 2022-11-22 NOTE — TELEPHONE ENCOUNTER
NOTES Status Details   OFFICE NOTE from referring provider Internal 08.31.2022 Nyasia Wise MD   OFFICE NOTE from other specialist     DISCHARGE SUMMARY from hospital     DISCHARGE REPORT from the ER     MEDICATION LIST Internal    LABS (Any and all labs)      Internal    Biopsy/pathology (Anything related to diagnoses I.e. fluid aspirations, lip biopsy, muscle biopsy)               Imaging (All imaging related to diagnoses)     Echo     HRCT     CXR     EMG                    Scleroderma/Dermatomyositis diagnoses     Previous Cardiology notes      Previous Pulmonary notes     Previous Dermatology notes     Previous GI notes     Lupus diagnoses     Previous Nephrology notes     Previous Dermatology notes     Previous Cardiology notes

## 2022-11-25 DIAGNOSIS — R70.0 ELEVATED ERYTHROCYTE SEDIMENTATION RATE: Primary | ICD-10-CM

## 2023-01-20 NOTE — PROGRESS NOTES
Discharge Note    Progress reporting period is from initial evaluation date (please see noted date below) to Nov 4, 2022.  Linked Episodes   Type: Episode: Status: Noted: Resolved: Last update: Updated by:   PHYSICAL THERAPY L ankle 10/19/2022 Active 10/19/2022  11/4/2022 12:04 PM Day, JO Greene      Comments:       Swetha failed to follow up and current status is unknown.  Please see information below for last relevant information on current status.  Patient seen for 2 visits.    SUBJECTIVE  Subjective changes noted by patient:  Pt is really improving, still has some stiffness in the AM for about 30 minutes or so.  Pt was able to do some trail walking as well.  .  Current pain level is  .     Previous pain level was   .   Changes in function:  Yes (See Goal flowsheet attached for changes in current functional level)  Adverse reaction to treatment or activity: None    OBJECTIVE  Changes noted in objective findings: Single leg stance 60 sec, L arch drops more than R.     ASSESSMENT/PLAN  Diagnosis: L ankle/foot pain   STG/LTGs have been met or progress has been made towards goals:  Yes, please see goal flowsheet for most current information  Assessment of Progress: current status is unknown.    Last current status: Pt is progressing as expected   Self Management Plans:  HEP  I have re-evaluated this patient and find that the nature, scope, duration and intensity of the therapy is appropriate for the medical condition of the patient.  Swetha continues to require the following intervention to meet STG and LTG's:  HEP.    Recommendations:  Discharge with current home program.  Patient to follow up with MD as needed.    Please refer to the daily flowsheet for treatment today, total treatment time and time spent performing 1:1 timed codes.

## 2023-02-09 ENCOUNTER — PRE VISIT (OUTPATIENT)
Dept: RHEUMATOLOGY | Facility: CLINIC | Age: 53
End: 2023-02-09

## 2023-02-16 ENCOUNTER — OFFICE VISIT (OUTPATIENT)
Dept: SLEEP MEDICINE | Facility: CLINIC | Age: 53
End: 2023-02-16
Attending: PEDIATRICS
Payer: COMMERCIAL

## 2023-02-16 VITALS
WEIGHT: 133 LBS | HEART RATE: 66 BPM | DIASTOLIC BLOOD PRESSURE: 86 MMHG | OXYGEN SATURATION: 100 % | BODY MASS INDEX: 26.81 KG/M2 | SYSTOLIC BLOOD PRESSURE: 134 MMHG | HEIGHT: 59 IN

## 2023-02-16 DIAGNOSIS — R51.9 MORNING HEADACHE: ICD-10-CM

## 2023-02-16 DIAGNOSIS — R06.83 SNORING: Primary | ICD-10-CM

## 2023-02-16 DIAGNOSIS — F41.9 ANXIETY: ICD-10-CM

## 2023-02-16 DIAGNOSIS — G47.00 INSOMNIA, UNSPECIFIED TYPE: ICD-10-CM

## 2023-02-16 PROCEDURE — 99205 OFFICE O/P NEW HI 60 MIN: CPT | Performed by: PHYSICIAN ASSISTANT

## 2023-02-16 NOTE — PATIENT INSTRUCTIONS
"      MY TREATMENT INFORMATION FOR SLEEP APNEA-  Swetha Sen    Am I having a home sleep study?  --->Watch the video for the device you are using:    -/drop off device-   https://www.youReclip.It.com/watch?v=yGGFBdELGhk    Frequently asked questions:  1. What is Obstructive Sleep Apnea (DANIELLE)? DANIELLE is the most common type of sleep apnea. Apnea means, \"without breath.\"  Apnea is most often caused by narrowing or collapse of the upper airway as muscles relax during sleep.   Almost everyone has occasional apneas. Most people with sleep apnea have had brief interruptions at night frequently for many years.  The severity of sleep apnea is related to how frequent and severe the events are.   2. What are the consequences of DANIELLE? Symptoms include: feeling sleepy during the day, snoring loudly, gasping or stopping of breathing, trouble sleeping, and occasionally morning headaches or heartburn at night.  Sleepiness can be serious and even increase the risk of falling asleep while driving. Other health consequences may include development of high blood pressure and other cardiovascular disease in persons who are susceptible. Untreated DANIELLE  can contribute to heart disease, stroke and diabetes.   3. What are the treatment options? In most situations, sleep apnea is a lifelong disease that must be managed with daily therapy. Medications are not effective for sleep apnea and surgery is generally not considered until other therapies have been tried. Your treatment is your choice . Continuous Positive Airway (CPAP) works right away and is the therapy that is effective in nearly everyone. An oral device to hold your jaw forward is usually the next most reliable option. Other options include postioning devices (to keep you off your back), weight loss, and surgery including a tongue pacing device. There is more detail about some of these options below.  4. Are my sleep studies covered by insurance? Although we will request verification " of coverage, we advise you also check in advance of the study to ensure there is coverage.    Important tips for those choosing CPAP and similar devices   Know your equipment:  CPAP is continuous positive airway pressure that prevents obstructive sleep apnea by keeping the throat from collapsing while you are sleeping. In most cases, the device is  smart  and can slowly self-adjusts if your throat collapses and keeps a record every day of how well you are treated-this information is available to you and your care team.  BPAP is bilevel positive airway pressure that keeps your throat open and also assists each breath with a pressure boost to maintain adequate breathing.  Special kinds of BPAP are used in patients who have inadequate breathing from lung or heart disease. In most cases, the device is  smart  and can slowly self-adjusts to assist breathing. Like CPAP, the device keeps a record of how well you are treated.  Your mask is your connection to the device. You get to choose what feels most comfortable and the staff will help to make sure if fits. Here: are some examples of the different masks that are available:       Key points to remember on your journey with sleep apnea:  Sleep study.  PAP devices often need to be adjusted during a sleep study to show that they are effective and adjusted right.  Good tips to remember: Try wearing just the mask during a quiet time during the day so your body adapts to wearing it. A humidifier is recommended for comfort in most cases to prevent drying of your nose and throat. Allergy medication from your provider may help you if you are having nasal congestion.  Getting settled-in. It takes more than one night for most of us to get used to wearing a mask. Try wearing just the mask during a quiet time during the day so your body adapts to wearing it. A humidifier is recommended for comfort in most cases. Our team will work with you carefully on the first day and will be in  contact within 4 days and again at 2 and 4 weeks for advice and remote device adjustments. Your therapy is evaluated by the device each day.   Use it every night. The more you are able to sleep naturally for 7-8 hours, the more likely you will have good sleep and to prevent health risks or symptoms from sleep apnea. Even if you use it 4 hours it helps. Occasionally all of us are unable to use a medical therapy, in sleep apnea, it is not dangerous to miss one night.   Communicate. Call our skilled team on the number provided on the first day if your visit for problems that make it difficult to wear the device. Over 2 out of 3 patients can learn to wear the device long-term with help from our team. Remember to call our team or your sleep providers if you are unable to wear the device as we may have other solutions for those who cannot adapt to mask CPAP therapy. It is recommended that you sleep your sleep provider within the first 3 months and yearly after that if you are not having problems.   Use it for your health. We encourage use of CPAP masks during daytime quiet periods to allow your face and brain to adapt to the sensation of CPAP so that it will be a more natural sensation to awaken to at night or during naps. This can be very useful during the first few weeks or months of adapting to CPAP though it does not help medically to wear CPAP during wakefulness and  should not be used as a strategy just to meet guidelines.  Take care of your equipment. Make sure you clean your mask and tubing using directions every day and that your filter and mask are replaced as recommended or if they are not working.     BESIDES CPAP, WHAT OTHER THERAPIES ARE THERE?    Positioning Device  Positioning devices are generally used when sleep apnea is mild and only occurs on your back.This example shows a pillow that straps around the waist. It may be appropriate for those whose sleep study shows milder sleep apnea that occurs  primarily when lying flat on one's back. Preliminary studies have shown benefit but effectiveness at home may need to be verified by a home sleep test. These devices are generally not covered by medical insurance.  Examples of devices that maintain sleeping on the back to prevent snoring and mild sleep apnea.    Belt type body positioner  http://[employer]/    Electronic reminder  http://nightshifttherapy.com/            Oral Appliance  What is oral appliance therapy?  An oral appliance device fits on your teeth at night like a retainer used after having braces. The device is made by a specialized dentist and requires several visits over 1-2 months before a manufactured device is made to fit your teeth and is adjusted to prevent your sleep apnea. Once an oral device is working properly, snoring should be improved. A home sleep test may be recommended at that time if to determine whether the sleep apnea is adequately treated.       Some things to remember:  -Oral devices are often, but not always, covered by your medical insurance. Be sure to check with your insurance provider.   -If you are referred for oral therapy, you will be given a list of specialized dentists to consider or you may choose to visit the Web site of the American Academy of Dental Sleep Medicine  -Oral devices are less likely to work if you have severe sleep apnea or are extremely overweight.     More detailed information  An oral appliance is a small acrylic device that fits over the upper and lower teeth  (similar to a retainer or a mouth guard). This device slightly moves jaw forward, which moves the base of the tongue forward, opens the airway, improves breathing for effective treat snoring and obstructive sleep apnea in perhaps 7 out of 10 people .  The best working devices are custom-made by a dental device  after a mold is made of the teeth 1, 2, 3.  When is an oral appliance indicated?  Oral appliance therapy is recommended  as a first-line treatment for patients with primary snoring, mild sleep apnea, and for patients with moderate sleep apnea who prefer appliance therapy to use of CPAP4, 5. Severity of sleep apnea is determined by sleep testing and is based on the number of respiratory events per hour of sleep.   How successful is oral appliance therapy?  The success rate of oral appliance therapy in patients with mild sleep apnea is 75-80% while in patients with moderate sleep apnea it is 50-70%. The chance of success in patients with severe sleep apnea is 40-50%. The research also shows that oral appliances have a beneficial effect on the cardiovascular health of DANIELLE patients at the same magnitude as CPAP therapy7.  Oral appliances should be a second-line treatment in cases of severe sleep apnea, but if not completely successful then a combination therapy utilizing CPAP plus oral appliance therapy may be effective. Oral appliances tend to be effective in a broad range of patients although studies show that the patients who have the highest success are females, younger patients, those with milder disease, and less severe obesity. 3, 6.   Finding a dentist that practices dental sleep medicine  Specific training is available through the American Academy of Dental Sleep Medicine for dentists interested in working in the field of sleep. To find a dentist who is educated in the field of sleep and the use of oral appliances, near you, visit the Web site of the American Academy of Dental Sleep Medicine.    References  1. Saumya et al. Objectively measured vs self-reported compliance during oral appliance therapy for sleep-disordered breathing. Chest 2013; 144(5): 5742-4040.  2. Gracie et al. Objective measurement of compliance during oral appliance therapy for sleep-disordered breathing. Thorax 2013; 68(1): 91-96.  3. Giselle et al. Mandibular advancement devices in 620 men and women with DANIELLE and snoring: tolerability and  predictors of treatment success. Chest 2004; 125: 8081-0594.  4. Andrés et al. Oral appliances for snoring and DANIELLE: a review. Sleep 2006; 29: 244-262.  5. Rafaela et al. Oral appliance treatment for DANIELLE: an update. J Clin Sleep Med 2014; 10(2): 215-227.  6. Davie et al. Predictors of OSAH treatment outcome. J Dent Res 2007; 86: 2957-4219.      Weight Loss:    Weight loss is a long-term strategy that may improve sleep apnea in some patients.    Weight management is a personal decision and the decision should be based on your interest and the potential benefits.  If you are interested in exploring weight loss strategies, the following discussion covers the impact on weight loss on sleep apnea and the approaches that may be successful.    Being overweight does not necessarily mean you will have health consequences.  Those who have BMI over 35 or over 27 with existing medical conditions carries greater risk.   Weight loss decreases severity of sleep apnea in most people with obesity. For those with mild obesity who have developed snoring with weight gain, even 15-30 pound weight loss can improve and occasionally eliminate sleep apnea.  Structured and life-long dietary and health habits are necessary to lose weight and keep healthier weight levels.     Though there may be significant health benefits from weight loss, long-term weight loss is very difficult to achieve- studies show success with dietary management in less than 10% of people. In addition, substantial weight loss may require years of dietary control and may be difficult if patients have severe obesity. In these cases, surgical management may be considered.  Finally, older individuals who have tolerated obesity without health complications may be less likely to benefit from weight loss strategies.      [unfilled]    Surgery:    Surgery for obstructive sleep apnea is considered generally only when other therapies fail to work. Surgery may be  discussed with you if you are having a difficult time tolerating CPAP and or when there is an abnormal structure that requires surgical correction.  Nose and throat surgeries often enlarge the airway to prevent collapse.  Most of these surgeries create pain for 1-2 weeks and up to half of the most common surgeries are not effective throughout life.  You should carefully discuss the benefits and drawbacks to surgery with your sleep provider and surgeon to determine if it is the best solution for you.   More information  Surgery for DANIELLE is directed at areas that are responsible for narrowing or complete obstruction of the airway during sleep.  There are a wide range of procedures available to enlarge and/or stabilize the airway to prevent blockage of breathing in the three major areas where it can occur: the palate, tongue, and nasal regions.  Successful surgical treatment depends on the accurate identification of the factors responsible for obstructive sleep apnea in each person.  A personalized approach is required because there is no single treatment that works well for everyone.  Because of anatomic variation, consultation with an examination by a sleep surgeon is a critical first step in determining what surgical options are best for each patient.  In some cases, examination during sedation may be recommended in order to guide the selection of procedures.  Patients will be counseled about risks and benefits as well as the typical recovery course after surgery. Surgery is typically not a cure for a person s DANIELLE.  However, surgery will often significantly improve one s DANIELLE severity (termed  success rate ).  Even in the absence of a cure, surgery will decrease the cardiovascular risk associated with OSA7; improve overall quality of life8 (sleepiness, functionality, sleep quality, etc).      Palate Procedures:  Patients with DANIELLE often have narrowing of their airway in the region of their tonsils and uvula.  The goals  of palate procedures are to widen the airway in this region as well as to help the tissues resist collapse.  Modern palate procedure techniques focus on tissue conservation and soft tissue rearrangement, rather than tissue removal.  Often the uvula is preserved in this procedure. Residual sleep apnea is common in patient after pharyngoplasty with an average reduction in sleep apnea events of 33%2.      Tongue Procedures:  ExamWhile patients are awake, the muscles that surround the throat are active and keep this region open for breathing. These muscles relax during sleep, allowing the tongue and other structures to collapse and block breathing.  There are several different tongue procedures available.  Selection of a tongue base procedure depends on characteristics seen on physical exam.  Generally, procedures are aimed at removing bulky tissues in this area or preventing the back of the tongue from falling back during sleep.  Success rates for tongue surgery range from 50-62%3.    Hypoglossal Nerve Stimulation:  Hypoglossal nerve stimulation has recently received approval from the [place] Food and Drug Administration for the treatment of obstructive sleep apnea.  This is based on research showing that the system was safe and effective in treating sleep apnea6.  Results showed that the median AHI score decreased 68%, from 29.3 to 9.0. This therapy uses an implant system that senses breathing patterns and delivers mild stimulation to airway muscles, which keeps the airway open during sleep.  The system consists of three fully implanted components: a small generator (similar in size to a pacemaker), a breathing sensor, and a stimulation lead.  Using a small handheld remote, a patient turns the therapy on before bed and off upon awakening.    Candidates for this device must be greater than 18 years of age, have moderate to severe DANIELLE (AHI between 15-65), BMI less than 35, have tried CPAP/oral appliance for at  least 8 weeks without success, and have appropriate upper airway anatomy (determined by a sleep endoscopy performed by Dr. Miguel Green).    Hypoglossal Nerve Stimulation Pathway:    The sleep surgeon s office will work with the patient through the insurance prior-authorization process (including communications and appeals).    Nasal Procedures:  Nasal obstruction can interfere with nasal breathing during the day and night.  Studies have shown that relief of nasal obstruction can improve the ability of some patients to tolerate positive airway pressure therapy for obstructive sleep apnea1.  Treatment options include medications such as nasal saline, topical corticosteroid and antihistamine sprays, and oral medications such as antihistamines or decongestants. Non-surgical treatments can include external nasal dilators for selected patients. If these are not successful by themselves, surgery can improve the nasal airway either alone or in combination with these other options.      Combination Procedures:  Combination of surgical procedures and other treatments may be recommended, particularly if patients have more than one area of narrowing or persistent positional disease.  The success rate of combination surgery ranges from 66-80%2,3.    References  Toshia BERNAL. The Role of the Nose in Snoring and Obstructive Sleep Apnoea: An Update.  Eur Arch Otorhinolaryngol. 2011; 268: 1365-73.   Captommy SM; Edil JA; Sylvain JR; Pallanch JF; Sharon MB; Severiano SG; Isatu BROOKS. Surgical modifications of the upper airway for obstructive sleep apnea in adults: a systematic review and meta-analysis. SLEEP 2010;33(10):8723-2041. Wilber DUNN. Hypopharyngeal surgery in obstructive sleep apnea: an evidence-based medicine review.  Arch Otolaryngol Head Neck Surg. 2006 Feb;132(2):206-13.  Manny YH1, Roel Y, Camilo SPRING. The efficacy of anatomically based multilevel surgery for obstructive sleep apnea. Otolaryngol Head Neck Surg. 2003  Oct;129(4):327-35.  Kezirian E, Goldberg A. Hypopharyngeal Surgery in Obstructive Sleep Apnea: An Evidence-Based Medicine Review. Arch Otolaryngol Head Neck Surg. 2006 Feb;132(2):206-13.  Anthony SEN et al. Upper-Airway Stimulation for Obstructive Sleep Apnea.  N Engl J Med. 2014 Jan 9;370(2):139-49.  Tad Y et al. Increased Incidence of Cardiovascular Disease in Middle-aged Men with Obstructive Sleep Apnea. Am J Respir Crit Care Med; 2002 166: 159-165  Etienne EM et al. Studying Life Effects and Effectiveness of Palatopharyngoplasty (SLEEP) study: Subjective Outcomes of Isolated Uvulopalatopharyngoplasty. Otolaryngol Head Neck Surg. 2011; 144: 623-631.    WHAT IF I ONLY HAVE SNORING?    Mandibular advancement devices, lateral sleep positioning, long-term weight loss and treatment of nasal allergies have been shown to improve snoring.  Exercising tongue muscles with a game (PillGuardttps://apps.[employer]/us/ana/soundly-reduce-snoring/ed7871754600) or stimulating the tongue during the day with a device (https://doi.org/10.3390/jmd97994486) have improved snoring in some individuals.    Remember to Drive Safe... Drive Alive     Sleep health profoundly affects your health, mood, and your safety.  Thirty three percent of the population (one in three of us) is not getting enough sleep and many have a sleep disorder. Not getting enough sleep or having an untreated / undertreated sleep condition may make us sleepy without even knowing it. In fact, our driving could be dramatically impaired due to our sleep health. As your provider, here are some things I would like you to know about driving:     Here are some warning signs for impairment and dangerous drowsy driving:              -Having been awake more than 16 hours               -Looking tired               -Eyelid drooping              -Head nodding (it could be too late at this point)              -Driving for more than 30 minutes     Some things you could do to make the  driving safer if you are experiencing some drowsiness:              -Stop driving and rest              -Call for transportation              -Make sure your sleep disorder is adequately treated     Some things that have been shown NOT to work when experiencing drowsiness while driving:              -Turning on the radio              -Opening windows              -Eating any  distracting  /  entertaining  foods (e.g., sunflower seeds, candy, or any other)              -Talking on the phone      Your decision may not only impact your life, but also the life of others. Please, remember to drive safe for yourself and all of us.

## 2023-02-16 NOTE — PROGRESS NOTES
Outpatient Sleep Medicine Consultation:      Name: Swetha Flores MRN# [id number]   Age: 52 year old YOB: 1970     Date of Consultation: February 16, 2023  Consultation is requested by: Nyasia Wise MD  [address] [place] [address] Nyasia Wise  Primary care provider: Nyasia Wise       Reason for Sleep Consult:     Swetha Flores is sent by Nyasia Wise for a sleep consultation regarding sleep difficulties.    Patient s Reason for visit  Swetha Flores main reason for visit:  I have always been a poor sleeper.  Often wake up at night super early and hard to fall asleep.  Snoring.  Migraines.  Patient states problem(s) started:  Always  Swetha Flores's goals for this visit:  Identify next steps for eventually starting to get 7-8 hours sleep per night         Assessment and Plan:     1. Snoring  2. Morning headache  3. Insomnia, sleep maintenance   4. Anxiety    Patient presents to clinic today for evaluation of snoring and concern that she may have underlying obstructive sleep apnea.  Patient reports loud snoring that is significant enough that her and her  no longer sleep in the same bed.  No witnessed apneas but again sleeps alone.  Denies excessive daytime sleepiness, ESS is 7/24.  She does wake up with morning headaches frequently, can turn into migraines and last all day.  Patient has sleep maintenance insomnia.  We reviewed pathophysiology of primary snoring versus DANIELLE.  We also reviewed cardiovascular consequences of untreated DANIELLE if present.  Patient would like to pursue overnight sleep study and an order for home sleep apnea testing was placed today.  Patient is concerned about how she would sleep with us in lab setting and HST is preferred by insurance.  If HST is inconclusive or negative could consider PSG.  - HST-Home Sleep Apnea Test - Noxturnal Returnable; Future    Briefly discussed potential treatment modalities in the event sleep apnea is  "identified on testing and additional information given in patient instructions. Will plan to discuss in more detail at next visit pending study results but she appears quite open to either CPAP or oral appliance therapy today.     Sleep maintenance insomnia multifactorial.  Untreated sleep apnea could certainly be playing a role.  Also admits to generalized anxiety playing a part, perimenopausal as well.  Patient voiced to me today she is not interested in any medication management for insomnia.  Will reevaluate after sleep study.  Could always consider CBT-I, though anxiety is more general than sleep specific.    Follow up 1-2 weeks following her study for review of results and to expedite care. Educational materials provided in instructions.          History of Present Illness:     Swetha Flores is a 52-year-old female with hypothyroidism, anxiety who presents to clinic today for evaluation of snoring and insomnia.  Primary reason for today's visit is to discuss snoring as she is concerned she may have sleep apnea.  She sleeps separately from her  most of the time due to snoring so no witnessed apneas.     SLEEP-WAKE SCHEDULE:     Work/School Days: Patient goes to school/work: Yes  Usually gets into bed between 10-10:45 PM  Takes patient about 0-10 minutes to fall asleep  Has trouble falling asleep 0-1 night per week  Wakes up in the middle of the night \"a few\" times  Wakes up due to various reasons-pain, need to use the restroom, anxiety, allergies, night sweats  She has trouble falling back asleep 4 times a week.   It usually takes 10 minutes or less to fall back asleep the first couple times, towards the end of the night will take \"long\" time to fall back asleep.  Admits anxiety plays a role with this, \"but just some general low-level anxiety stuff\".  Patient is usually up at 5:30-6:00AM    Weekends/Non-work Days/All Other Days:  Usually gets into bed at 10-10:45PM  Patient is usually up by " "8:00AM    Naps  Patient takes a purposeful nap 0 times a week - \"maybe once in a blue moon\"  She dozes off unintentionally 0 days per week  Patient has had a driving accident or near-miss due to sleepiness/drowsiness:  No  She had a total [place] Sleepiness Scale of 7 (02/16/23 1300), with scores of 10 or higher indicating abnormal levels of sleepiness.    SLEEP DISRUPTIONS:    Breathing/Snoring  Patient snores: Yes \"for forever\"  Other people complain about her snoring:  Yes  Patient has been told she stops breathing in her sleep: No but sleeps alone   No gasping/choking arousals   She has issues with the following:  Morning headaches \"not rare\"    Movement:  Denies restless legs syndrome   Patient has been told she kicks her legs at night: No     Behaviors in Sleep:  Dentist reports bruxism   No dream enactment, sleepwalking, nightmares    CAFFEINE AND OTHER SUBSTANCES:    Patient consumes caffeinated beverages per day: 1-2 cup of tea in AM  List of any prescribed or over the counter stimulants that patient takes: None  List of any prescribed or over the counter sleep medication patient takes: None  List of previous sleep medications that patient has tried:  None  Patient drinks alcohol to help them sleep:  No  Patient drinks alcohol near bedtime:  No    Family History:  Patient has a family member been diagnosed with a sleep disorder:  Mother with insomnia      SCALES:    EPWORTH SLEEPINESS SCALE      [place] Sleepiness Scale ( MARILEE Dewey  1990-1997[facility] [place]/English - Final version - 21 Nov 07 - [facility] [place].) 2/16/2023   [place] Score (Sleep) 7         INSOMNIA SEVERITY INDEX (OSORIO)      Insomnia Severity Index (OSORIO) 2/16/2023   Difficulty falling asleep 0   Difficulty staying asleep 2   Problems waking up too early 3   How SATISFIED/DISSATISFIED are you with your CURRENT sleep pattern? 4   How NOTICEABLE to others do you think your sleep problem is in terms of impairing the quality of your life? 1 "   How WORRIED/DISTRESSED are you about your current sleep problem? 2   To what extent do you consider your sleep problem to INTERFERE with your daily functioning (e.g. daytime fatigue, mood, ability to function at work/daily chores, concentration, memory, mood, etc.) CURRENTLY? 3   OSORIO Total Score 15       Guidelines for Scoring/Interpretation:  Total score categories:  0-7 = No clinically significant insomnia   8-14 = Subthreshold insomnia   15-21 = Clinical insomnia (moderate severity)  22-28 = Clinical insomnia (severe)  Used via courtesy of www.Zeomatrixealth.va.gov with permission from Sean Peterson PhD., [facility]      Allergies:    Allergies   Allergen Reactions     Seasonal Allergies      Barley Grass Rash       Medications:    Current Outpatient Medications   Medication Sig Dispense Refill     SYNTHROID 125 MCG tablet Take 1 tablet (125 mcg) by mouth daily 90 tablet 3       Problem List:  Patient Active Problem List    Diagnosis Date Noted     Pain in joint involving ankle and foot, left 10/19/2022     Priority: Medium     Psoriasis 10/09/2018     Priority: Medium     Bilateral breast cysts 10/09/2018     Priority: Medium     Vegan diet 05/02/2016     Priority: Medium     Vitamin D deficiency 11/27/2013     Priority: Medium     Hypothyroidism      Priority: Medium     Problem list name updated by automated process. Provider to review          Past Medical/Surgical History:  Past Medical History:   Diagnosis Date     Ankle pain 9/1/2009     Anxiety 4/4/2011     Arthropathy, unspecified, site unspecified     has been to PT without much relief.     Cervicalgia 5/23/2007     Concussion 2017     Hyperlipidemia LDL goal <160 10/31/2010     Hypertension 2004    Only during my second pregnancy     JOINT PAIN-LOWER LEG 3/21/2007     JOINT PAIN-PELVIS 4/9/2008     Unspecified hypothyroidism      Past Surgical History:   Procedure Laterality Date     [facility] NONSPECIFIC PROCEDURE  2001, 2004    2 NSVDs        Social  History:  Social History     Socioeconomic History     Marital status:      Spouse name: Not on file     Number of children: Not on file     Years of education: Not on file     Highest education level: Not on file   Occupational History     Not on file   Tobacco Use     Smoking status: Never     Smokeless tobacco: Never   Vaping Use     Vaping Use: Never used   Substance and Sexual Activity     Alcohol use: No     Drug use: No     Sexual activity: Yes     Partners: Male     Birth control/protection: Condom   Other Topics Concern     Parent/sibling w/ CABG, MI or angioplasty before 65F 55M? No   Social History Narrative            2 children - [facility]     Social Determinants of Health     Financial Resource Strain: Low Risk      Difficulty of Paying Living Expenses: Not hard at all   Food Insecurity: No Food Insecurity     Worried About Running Out of Food in the Last Year: Never true     Ran Out of Food in the Last Year: Never true   Transportation Needs: No Transportation Needs     Lack of Transportation (Medical): No     Lack of Transportation (Non-Medical): No   Physical Activity: Insufficiently Active     Days of Exercise per Week: 4 days     Minutes of Exercise per Session: 30 min   Stress: No Stress Concern Present     Feeling of Stress : Only a little   Social Connections: Unknown     Frequency of Communication with Friends and Family: More than three times a week     Frequency of Social Gatherings with Friends and Family: Once a week     Attends Congregational Services: Patient refused     Active Member of Clubs or Organizations: Yes     Attends Club or Organization Meetings: Not on file     Marital Status:    Intimate Partner Violence: Not on file   Housing Stability: Unknown     Unable to Pay for Housing in the Last Year: No     Number of Places Lived in the Last Year: Not on file     Unstable Housing in the Last Year: No       Family History:  Family History  "  Problem Relation Age of Onset     Lipids Mother      Depression Mother      Thyroid Disease Mother      Hyperlipidemia Mother      Asthma Mother      Arthritis Father      Cardiovascular Father      Asthma Son      Breast Cancer No family hx of      Ovarian Cancer No family hx of      Review of Systems:  Positive for night sweats, pain at night, difficulty breathing through nose, sore throat in the morning, urinating more than once at night, headaches in the morning, anxiety    Physical Examination:  Vitals: /86   Pulse 66   Ht 1.499 m (4' 11\")   Wt 60.3 kg (133 lb)   SpO2 100%   BMI 26.86 kg/m    BMI= Body mass index is 26.86 kg/m .  General appearance: Awake, alert, cooperative. Well groomed. Sitting comfortably in chair. In no apparent distress.  HEENT: Head: Normocephalic, atraumatic. Eyes: PERRL. Conjunctiva clear. Sclera normal.  Nose: External appears normal.  Oropharynx: Mallampati classification II.   Neck:  Neck Cir (cm): 33 cm (2/16/2023  1:00 PM)  Skin:   No rashes or significant lesions.   Neurologic:Alert, oriented x3. No focal neurological deficit. Gait normal.   Psychiatric: Mood euthymic. Affect congruent with full range and intensity.         Data: All pertinent previous laboratory data reviewed     Recent Labs   Lab Test 03/08/22  1136 10/12/21  0838    138   POTASSIUM 3.9 4.1   CHLORIDE 105 107   CO2 21 24   ANIONGAP 11 7   GLC 83 80   BUN 11 15   CR 0.70 0.71   REGINALDO 10.0 9.2       Recent Labs   Lab Test 03/08/22  1136   WBC 6.3   RBC 4.46   HGB 13.1   HCT 38.4   MCV 86   MCH 29.4   MCHC 34.1   RDW 13.1          Recent Labs   Lab Test 10/12/21  0838   PROTTOTAL 7.2   ALBUMIN 3.6   BILITOTAL 0.6   ALKPHOS 60   AST 13   ALT 23       TSH (mU/L)   Date Value   03/08/2022 1.61   10/12/2021 0.60   11/07/2018 0.70   09/28/2018 0.30 (L)       No results found for: UAMP, UBARB, BENZODIAZEUR, UCANN, UCOC, OPIT, UPCP    Iron Saturation Index   Date/Time Value Ref Range Status "   09/01/2010 08:15 AM 23 15 - 46 % Final       No results found for: PH, PHARTERIAL, PO2, [id number], SAT, PCO2, HCO3, BASEEXCESS, TAYE, BEB    @LABRCNTIPR(phv:4,pco2v:4,po2v:4,hco3v:4,jose manuel:4,o2per:4)@    Echocardiology: No results found for this or any previous visit (from the past 4320 hour(s)).    Chest x-ray: No results found for this or any previous visit from the past 365 days.      Chest CT: No results found for this or any previous visit from the past 365 days.      PFT: Most Recent [place] Pulmonary Function Testing    No results found for: 20001  No results found for: 20002  No results found for: 20003  No results found for: 20015  No results found for: 20016  No results found for: 20027  No results found for: 20028  No results found for: 20029  No results found for: 20079  No results found for: 20080  No results found for: 20081  No results found for: 20335  No results found for: 20105  No results found for: 20053  No results found for: 20054  No results found for: 20055      Jodi Frost PA-C 2/16/2023     [facility] [place]  [address] [address]     [facility]  [address] [address]    Chart documentation was completed, in part, with ubigrate voice-recognition software. Even though reviewed, some grammatical, spelling, and word errors may remain.    60 minutes spent on day of encounter reviewing medical records, history and physical examination, review of previous testing and interpretation, documentation and further activities as noted above

## 2023-02-16 NOTE — NURSING NOTE
"Chief Complaint   Patient presents with     Sleep Problem     Trouble staying asleep, Snoring,  causes Migraines, Bruxism       Initial BP (!) 143/90   Pulse 66   Ht 1.499 m (4' 11\")   Wt 60.3 kg (133 lb)   SpO2 100%   BMI 26.86 kg/m   Estimated body mass index is 26.86 kg/m  as calculated from the following:    Height as of this encounter: 1.499 m (4' 11\").    Weight as of this encounter: 60.3 kg (133 lb).    Medication Reconciliation: complete  ESS 7  Neck circumference: 33 centimeters.  Trina Reagan MA  "

## 2023-02-26 ENCOUNTER — LAB (OUTPATIENT)
Dept: LAB | Facility: CLINIC | Age: 53
End: 2023-02-26
Payer: COMMERCIAL

## 2023-02-26 DIAGNOSIS — R70.0 ELEVATED ERYTHROCYTE SEDIMENTATION RATE: ICD-10-CM

## 2023-02-26 LAB
BASOPHILS # BLD AUTO: 0 10E3/UL (ref 0–0.2)
BASOPHILS NFR BLD AUTO: 0 %
EOSINOPHIL # BLD AUTO: 0.1 10E3/UL (ref 0–0.7)
EOSINOPHIL NFR BLD AUTO: 1 %
ERYTHROCYTE [DISTWIDTH] IN BLOOD BY AUTOMATED COUNT: 13.2 % (ref 10–15)
ERYTHROCYTE [SEDIMENTATION RATE] IN BLOOD BY WESTERGREN METHOD: 16 MM/HR (ref 0–30)
HCT VFR BLD AUTO: 36.6 % (ref 35–47)
HGB BLD-MCNC: 12.7 G/DL (ref 11.7–15.7)
LYMPHOCYTES # BLD AUTO: 1.7 10E3/UL (ref 0.8–5.3)
LYMPHOCYTES NFR BLD AUTO: 26 %
MCH RBC QN AUTO: 28.7 PG (ref 26.5–33)
MCHC RBC AUTO-ENTMCNC: 34.7 G/DL (ref 31.5–36.5)
MCV RBC AUTO: 83 FL (ref 78–100)
MONOCYTES # BLD AUTO: 0.5 10E3/UL (ref 0–1.3)
MONOCYTES NFR BLD AUTO: 7 %
NEUTROPHILS # BLD AUTO: 4.1 10E3/UL (ref 1.6–8.3)
NEUTROPHILS NFR BLD AUTO: 65 %
PLATELET # BLD AUTO: 184 10E3/UL (ref 150–450)
RBC # BLD AUTO: 4.43 10E6/UL (ref 3.8–5.2)
WBC # BLD AUTO: 6.4 10E3/UL (ref 4–11)

## 2023-02-26 PROCEDURE — 85025 COMPLETE CBC W/AUTO DIFF WBC: CPT

## 2023-02-26 PROCEDURE — 86140 C-REACTIVE PROTEIN: CPT

## 2023-02-26 PROCEDURE — 85652 RBC SED RATE AUTOMATED: CPT

## 2023-02-26 PROCEDURE — 36415 COLL VENOUS BLD VENIPUNCTURE: CPT

## 2023-02-27 LAB — CRP SERPL-MCNC: <3 MG/L

## 2023-04-15 ENCOUNTER — HEALTH MAINTENANCE LETTER (OUTPATIENT)
Age: 53
End: 2023-04-15

## 2023-05-04 ENCOUNTER — OFFICE VISIT (OUTPATIENT)
Dept: SLEEP MEDICINE | Facility: CLINIC | Age: 53
End: 2023-05-04
Attending: PHYSICIAN ASSISTANT
Payer: COMMERCIAL

## 2023-05-04 DIAGNOSIS — F41.9 ANXIETY: ICD-10-CM

## 2023-05-04 DIAGNOSIS — G47.00 INSOMNIA, UNSPECIFIED TYPE: ICD-10-CM

## 2023-05-04 DIAGNOSIS — R06.83 SNORING: ICD-10-CM

## 2023-05-04 DIAGNOSIS — R51.9 MORNING HEADACHE: ICD-10-CM

## 2023-05-04 PROCEDURE — G0399 HOME SLEEP TEST/TYPE 3 PORTA: HCPCS | Performed by: INTERNAL MEDICINE

## 2023-05-04 NOTE — PROGRESS NOTES
Pt is completing a home sleep test. Pt was instructed on how to put on the Noxturnal T3 device and associated equipment before going to bed and given the opportunity to practice putting it on before leaving the sleep center. Pt was reminded to bring the home sleep test kit back to the center tomorrow, at agreed upon time for download and reporting.   Neck circumference: 33 CM / 13 inches.

## 2023-05-05 ENCOUNTER — DOCUMENTATION ONLY (OUTPATIENT)
Dept: SLEEP MEDICINE | Facility: CLINIC | Age: 53
End: 2023-05-05
Payer: COMMERCIAL

## 2023-05-05 NOTE — PROGRESS NOTES
HST POST-STUDY QUESTIONNAIRE    1. What time did you go to bed?  10:00 p.m.  2. How long do you think it took to fall asleep?  About 15 - 20 min  3. What time did you wake up to start the day?  6 a.m.  4. Did you get up during the night at all?  Yes, multiple times  5. If you woke up, do you remember approximately what time(s)? 1:00 a.m. for longer, a couple of times before, 2 after that  6. Did you have any difficulty with the equipment?  No  7. Did you us any type of treatment with this study?  None  8. Was the head of the bed elevated? No  9. Did you sleep in a recliner?  No  10. Did you stop using CPAP at least 3 days before this test?  NA  11. Any other information you'd like us to know? It was uncomfortable especially the nose prongs, so I slept worse than usual

## 2023-05-05 NOTE — PROGRESS NOTES
This HSAT was performed using a Noxturnal T3 device which recorded snore, sound, movement activity, body position, nasal pressure, oronasal thermal airflow, pulse, oximetry and both chest and abdominal respiratory effort. HSAT data was restricted to the time patient states they were in bed.     HSAT was scored using 1B 4% hypopnea rule.     HST AHI (Non-PAT): 6.3  Snoring was reported as mild.  Time with SpO2 below 89% was 2.9 minutes.   Overall signal quality was good.     Pt will follow up with sleep provider to determine appropriate therapy.

## 2023-05-05 NOTE — PROCEDURES
"HOME SLEEP STUDY INTERPRETATION        Patient: Swetha Flores  MRN: [id number]  YOB: 1970  Study Date: 5/4/2023  PCP/Referring Provider: Nyasia Wise;   Ordering Provider: Jodi Frost PA-C         Indications for Home Study: Swetha Flores is a 52 year old female who presents with symptoms suggestive of obstructive sleep apnea.    Estimated body mass index is 26.86 kg/m  as calculated from the following:    Height as of 2/16/23: 1.499 m (4' 11\").    Weight as of 2/16/23: 60.3 kg (133 lb).  Total score - [place]: 7 (2/16/2023  1:00 PM)      Data: A full night home sleep study was performed recording the standard physiologic parameters including body position, movement, sound, nasal pressure, thermal oral airflow, chest and abdominal movements with respiratory inductance plethysmography, and oxygen saturation by pulse oximetry. Pulse rate was estimated by oximetry recording. This study was considered adequate based on > 4 hours of quality oximetry and respiratory recording. As specified by the AASM Manual for the Scoring of Sleep and Associated events, version 2.3, Rule VIII.D 1B, 4% oxygen desaturation scoring for hypopneas is used as a standard of care on all home sleep apnea testing.        Analysis Time:  482 minutes        Respiration:   Sleep Associated Hypoxemia: sustained hypoxemia was not present. Baseline oxygen saturation was 98%.  Time with saturation less than or equal to 88% was 2.9 minutes. The lowest oxygen saturation was 80%.   Snoring: Snoring was present.  Respiratory events: The home study revealed a presence of 36 obstructive apneas and 3 mixed and central apneas. There were 6 hypopneas resulting in a combined apnea/hypopnea index [AHI] of 6.3 events per hour.  AHI was 6.9 per hour supine, - per hour prone, 1.2 per hour on left side, and 7 per hour on right side.   Pattern: Excluding events noted above, respiratory rate and pattern was Normal.      Position: Percent of time " spent: supine - 48.9%, prone - 0.4%, on left - 10%, on right - 30%.      Heart Rate: By pulse oximetry normal rate was noted.       Assessment:     Mild obstructive sleep apnea.    Sleep associated hypoxemia was not present.    Recommendations:    If treatment of mild obstructive sleep apnea is clinically indicated, consider following therapy options.    If there is excessive daytime sleepiness or other qualified medical comorbidity, consider auto titrating CPAP therapy for treatment.    Alternatively, patient can consider oral appliance therapy through referral to dental sleep medicine for management of mild obstructive sleep apnea.    Suggest optimizing sleep hygiene and avoiding sleep deprivation.      Diagnosis Code(s): Obstructive Sleep Apnea G47.33    Dharmesh Vasquez MD, May 5, 2023   Diplomate, American Board of Psychiatry and Neurology, Sleep Medicine

## 2023-05-17 NOTE — PROGRESS NOTES
"[facility]   Outpatient Sleep Medicine  May 18, 2023       Name: Swetha Flores MRN# [id number]   Age: 52 year old YOB: 1970            Assessment and Plan:   1. DANIELLE (obstructive sleep apnea)  During this visit, we reviewed the summary of the study including position, event distribution, oximetry and heart rate. Mild obstructive sleep apnea was seen with AHI 6.3 events per hour. Sleep associated hypoxemia was not present.    Discussed potential treatment options for her mild sleep apnea if wanting to pursue treatment including CPAP, mandibular advancement device. Would like to pursue oral appliance therapy and referral to sleep dentist placed.   - Sleep Dental Referral; Future    Follow-up per sleep dentistry recommendations.        Chief Complaint      Chief Complaint   Patient presents with     Results     Follow up sleep test          History of Present Illness:   Swetha Flores is a 52 year old female who presents to the clinic for results of recent sleep study completed on 5/4/2023. Study was completed to evaluate for sleep apnea.      Reviewed results of sleep study with patient as follows:    HOME SLEEP STUDY INTERPRETATION   Estimated body mass index is 26.86 kg/m  as calculated from the following:    Height as of 2/16/23: 1.499 m (4' 11\").    Weight as of 2/16/23: 60.3 kg (133 lb).  Total score - [place]: 7 (2/16/2023  1:00 PM)     Analysis Time:  482 minutes      Respiration:   Sleep Associated Hypoxemia: sustained hypoxemia was not present. Baseline oxygen saturation was 98%.  Time with saturation less than or equal to 88% was 2.9 minutes. The lowest oxygen saturation was 80%.   Snoring: Snoring was present.  Respiratory events: The home study revealed a presence of 36 obstructive apneas and 3 mixed and central apneas. There were 6 hypopneas resulting in a combined apnea/hypopnea index [AHI] of 6.3 events per hour.  AHI was 6.9 per hour supine, - per hour prone, 1.2 per hour on " "left side, and 7 per hour on right side.   Pattern: Excluding events noted above, respiratory rate and pattern was Normal.     Position: Percent of time spent: supine - 48.9%, prone - 0.4%, on left - 10%, on right - 30%.     Heart Rate: By pulse oximetry normal rate was noted.      Past medical/surgical history, family history, social history, medications and allergies were reviewed.           Physical Examination:   /82   Pulse 71   Ht 1.499 m (4' 11\")   Wt 58.4 kg (128 lb 12.8 oz)   SpO2 99%   BMI 26.01 kg/m    General appearance: Awake, alert, cooperative. Well groomed. Sitting comfortably in chair. In no apparent distress.  HEENT: Head: Normocephalic, atraumatic. Eyes:Conjunctiva clear. Sclera normal. Nose: External appearance without deformity.   Pulmonary:  Able to speak easily in full sentences. No cough or wheeze.   Skin:  No rashes or significant lesions on visible skin.   Neurologic: Alert, oriented x3.   Psychiatric: Mood euthymic. Affect congruent with full range and intensity.      CC:  Nyasia Wise PA-C  May 18, 2023     [facility]  [address] [place] [address]     [facility]  [address] [address]    Chart documentation was completed, in part, with [employer] voice-recognition software. Even though reviewed, some grammatical, spelling, and word errors may remain.    26 minutes spent on day of encounter doing chart review, history and exam, documentation, and further activities as noted above      "

## 2023-05-18 ENCOUNTER — OFFICE VISIT (OUTPATIENT)
Dept: SLEEP MEDICINE | Facility: CLINIC | Age: 53
End: 2023-05-18
Payer: COMMERCIAL

## 2023-05-18 VITALS
SYSTOLIC BLOOD PRESSURE: 123 MMHG | WEIGHT: 128.8 LBS | HEIGHT: 59 IN | DIASTOLIC BLOOD PRESSURE: 82 MMHG | HEART RATE: 71 BPM | OXYGEN SATURATION: 99 % | BODY MASS INDEX: 25.96 KG/M2

## 2023-05-18 DIAGNOSIS — G47.33 OSA (OBSTRUCTIVE SLEEP APNEA): Primary | ICD-10-CM

## 2023-05-18 PROCEDURE — 99213 OFFICE O/P EST LOW 20 MIN: CPT | Performed by: PHYSICIAN ASSISTANT

## 2023-05-18 NOTE — NURSING NOTE
"Chief Complaint   Patient presents with     Results     Follow up sleep test       Initial /82   Pulse 71   Ht 1.499 m (4' 11\")   Wt 58.4 kg (128 lb 12.8 oz)   SpO2 99%   BMI 26.01 kg/m   Estimated body mass index is 26.01 kg/m  as calculated from the following:    Height as of this encounter: 1.499 m (4' 11\").    Weight as of this encounter: 58.4 kg (128 lb 12.8 oz).    Medication Reconciliation: complete  Billy Collins MA      "

## 2023-05-18 NOTE — PATIENT INSTRUCTIONS
[facility]  Search engine: https://mms.aadsm.org/members/directory/search_bootstrap.php?org_id=ADSM&   Certified in Dental Sleep Medicine    Yovani Grant  Degree: DDS  [address]  [address]  [place] [address]  Professional Phone: [phone]  Website: [email or web address]    Luis Kathleen  Degree: DDS  [facility]  [address]  [address]  [place] [address]  Professional Phone: [phone]: [phone]    Mely Duarte  [facility]  [address]  [place] [address]  Professional Phone: [phone]  Website: [email or web address]      Philippe Vann  Degree: DDS  [address]  [address]  [place] [address]  Professional Phone: [phone]  Fax: [phone]    Lee Ruth  Degree: DDS  [facility]  [address]  [place] [address]  Professional Phone: [phone]  Website: [email or web address]      [facility]-you should verify insurance coverage  [address]  [address]  [place] [address]  Professional Phone: [phone]  Website: [email or web address]      Ely Ashby--DOES NOT ACCEPT INSURANCE  Degree: DDS--you should verify insurance coverage  [facility]  [address]  [address]  [place] [address]  Professional Phone: [phone]     Lizet Lees  Degree: DDS, PhD  [facility]  [address] [place] [address] [address] [place] [address]   [address] [facility],   [address]   [place] [address]   Appointments: [phone]   Fax: [phone]   [facility] [place]   [address] [place] [address]   [address]   [place] [address]   Appointments: [phone]   Fax: [phone]                Magdiel  Portillo  Degree: JOSE  [address] [place] [address]  Professional Phone: [phone]  Fax: [phone]  Website: [email or web address]      Sacha Bowser  Degree: JOSE  [facility]  [address] [place] [address]    Mariona Mulet Pradera  Degree: MS JOSE  [facility]  [address] [place] [address]  Professional Phone: [phone]      Patrica Guerra  Degree: MS JOSE  [facility]  [address] [facility] [facility]  [address]  [place] [address]  Professional Phone: [phone]    Paula Alfaro  Degree: JOSE  [facility]  [address]  [address]  [place] [address]  Larose Office     Preston Donis  Degree: JOSE  [facility]  [address] [place] [address]  Professional Phone: [phone]  Website: [email or web address]      Glenn Giron  Degree: JOSE  [facility]  [address] [place] [address]  Professional Phone: [phone]  Fax: [phone]      Brian Wilcox  Degree: JOSE [facility]  [address] [facility] [address] [facility] [address]  [address]  [place] [address]                 ACCEPT MEDICARE  Herman Orozco DDS  [address] [facility], [address], [place] [address]  [phone]; [phone] (fax)  [employer]    Emre Lewis DDS, MS   [facility]   [address]   [address]   [place] [address]   Appointments: [phone]   Fax: [phone]     Montrell Nath DDS   [address]   [place] [address]  Appointments [phone]      ADDITIONAL PROVIDERS    Shayne Freitas DDS   [facility]   [address] [facility] [address],   [address]   [place] [address]   Appointments: [phone]   Fax: [phone]       Rafael Vasquez DDS, [facility] Professional [address] [address]   Appointments: [phone] Ext: [phone]  Fax: [phone]   [email or web address]

## 2023-06-28 DIAGNOSIS — E03.9 HYPOTHYROIDISM, UNSPECIFIED TYPE: ICD-10-CM

## 2023-06-29 RX ORDER — LEVOTHYROXINE SODIUM 125 MCG
125 TABLET ORAL DAILY
Qty: 90 TABLET | Refills: 0 | Status: SHIPPED | OUTPATIENT
Start: 2023-06-29 | End: 2023-08-31

## 2023-07-10 NOTE — TELEPHONE ENCOUNTER
Detail Level: Generalized Patient sent DineroMail message regarding MRI results. Sent results and recommendations to patient via DineroMail. See additional encounter dated 8/29/22 for further documentation.    Slime Rollins MBA, ATC     Detail Level: Detailed

## 2023-08-27 ENCOUNTER — MYC MEDICAL ADVICE (OUTPATIENT)
Dept: PEDIATRICS | Facility: CLINIC | Age: 53
End: 2023-08-27
Payer: COMMERCIAL

## 2023-08-27 DIAGNOSIS — E03.9 HYPOTHYROIDISM, UNSPECIFIED TYPE: Primary | ICD-10-CM

## 2023-08-28 NOTE — TELEPHONE ENCOUNTER
See patient's MyChart message   - Patient requesting thyroid labs to check her thyroid levels   - Patient reports that she has been experiencing elevated insomnia and sweating   - Pended TSH with free T4 reflex     Dr. Wise, please review and order as appropriate.     Lu ROJAS RN   [facility]

## 2023-08-29 ENCOUNTER — LAB (OUTPATIENT)
Dept: LAB | Facility: CLINIC | Age: 53
End: 2023-08-29
Payer: COMMERCIAL

## 2023-08-29 DIAGNOSIS — E03.9 HYPOTHYROIDISM, UNSPECIFIED TYPE: ICD-10-CM

## 2023-08-29 LAB
T4 FREE SERPL-MCNC: 1.49 NG/DL (ref 0.9–1.7)
TSH SERPL DL<=0.005 MIU/L-ACNC: 0.1 UIU/ML (ref 0.3–4.2)

## 2023-08-29 PROCEDURE — 84439 ASSAY OF FREE THYROXINE: CPT

## 2023-08-29 PROCEDURE — 84443 ASSAY THYROID STIM HORMONE: CPT

## 2023-08-29 PROCEDURE — 36415 COLL VENOUS BLD VENIPUNCTURE: CPT

## 2023-08-31 ENCOUNTER — OFFICE VISIT (OUTPATIENT)
Dept: PEDIATRICS | Facility: CLINIC | Age: 53
End: 2023-08-31
Payer: COMMERCIAL

## 2023-08-31 VITALS
DIASTOLIC BLOOD PRESSURE: 70 MMHG | RESPIRATION RATE: 16 BRPM | OXYGEN SATURATION: 99 % | HEART RATE: 80 BPM | BODY MASS INDEX: 26.6 KG/M2 | TEMPERATURE: 98.1 F | SYSTOLIC BLOOD PRESSURE: 122 MMHG | HEIGHT: 58 IN | WEIGHT: 126.7 LBS

## 2023-08-31 DIAGNOSIS — Z00.00 ROUTINE GENERAL MEDICAL EXAMINATION AT A HEALTH CARE FACILITY: Primary | ICD-10-CM

## 2023-08-31 DIAGNOSIS — F41.9 ANXIETY: ICD-10-CM

## 2023-08-31 DIAGNOSIS — E03.9 HYPOTHYROIDISM, UNSPECIFIED TYPE: ICD-10-CM

## 2023-08-31 DIAGNOSIS — Z12.31 VISIT FOR SCREENING MAMMOGRAM: ICD-10-CM

## 2023-08-31 PROCEDURE — 99396 PREV VISIT EST AGE 40-64: CPT | Performed by: PEDIATRICS

## 2023-08-31 RX ORDER — LEVOTHYROXINE SODIUM 112 UG/1
112 TABLET ORAL DAILY
Qty: 90 TABLET | Refills: 3 | Status: CANCELLED | OUTPATIENT
Start: 2023-08-31

## 2023-08-31 RX ORDER — LEVOTHYROXINE SODIUM 100 UG/1
100 TABLET ORAL DAILY
Qty: 90 TABLET | Refills: 3 | Status: SHIPPED | OUTPATIENT
Start: 2023-08-31 | End: 2024-09-30

## 2023-08-31 RX ORDER — ALPRAZOLAM 0.5 MG
0.5 TABLET ORAL 2 TIMES DAILY PRN
Qty: 10 TABLET | Refills: 0 | Status: SHIPPED | OUTPATIENT
Start: 2023-08-31

## 2023-08-31 SDOH — HEALTH STABILITY: PHYSICAL HEALTH: ON AVERAGE, HOW MANY DAYS PER WEEK DO YOU ENGAGE IN MODERATE TO STRENUOUS EXERCISE (LIKE A BRISK WALK)?: 4 DAYS

## 2023-08-31 SDOH — ECONOMIC STABILITY: INCOME INSECURITY: HOW HARD IS IT FOR YOU TO PAY FOR THE VERY BASICS LIKE FOOD, HOUSING, MEDICAL CARE, AND HEATING?: NOT HARD AT ALL

## 2023-08-31 SDOH — ECONOMIC STABILITY: FOOD INSECURITY: WITHIN THE PAST 12 MONTHS, THE FOOD YOU BOUGHT JUST DIDN'T LAST AND YOU DIDN'T HAVE MONEY TO GET MORE.: NEVER TRUE

## 2023-08-31 SDOH — ECONOMIC STABILITY: FOOD INSECURITY: WITHIN THE PAST 12 MONTHS, YOU WORRIED THAT YOUR FOOD WOULD RUN OUT BEFORE YOU GOT MONEY TO BUY MORE.: NEVER TRUE

## 2023-08-31 SDOH — ECONOMIC STABILITY: INCOME INSECURITY: IN THE LAST 12 MONTHS, WAS THERE A TIME WHEN YOU WERE NOT ABLE TO PAY THE MORTGAGE OR RENT ON TIME?: NO

## 2023-08-31 SDOH — HEALTH STABILITY: PHYSICAL HEALTH: ON AVERAGE, HOW MANY MINUTES DO YOU ENGAGE IN EXERCISE AT THIS LEVEL?: 30 MIN

## 2023-08-31 ASSESSMENT — ENCOUNTER SYMPTOMS
HEMATURIA: 0
NERVOUS/ANXIOUS: 0
CHILLS: 0
HEARTBURN: 0
EYE PAIN: 0
BREAST MASS: 0
FEVER: 0
PALPITATIONS: 0
NAUSEA: 0
ARTHRALGIAS: 0
MYALGIAS: 0
HEADACHES: 1
PARESTHESIAS: 0
COUGH: 0
DYSURIA: 0
SORE THROAT: 0
WEAKNESS: 0
ABDOMINAL PAIN: 0
HEMATOCHEZIA: 0
SHORTNESS OF BREATH: 0
JOINT SWELLING: 0
CONSTIPATION: 0
DIARRHEA: 0
DIZZINESS: 0
FREQUENCY: 0

## 2023-08-31 ASSESSMENT — LIFESTYLE VARIABLES
HOW MANY STANDARD DRINKS CONTAINING ALCOHOL DO YOU HAVE ON A TYPICAL DAY: PATIENT DOES NOT DRINK
AUDIT-C TOTAL SCORE: 0
HOW OFTEN DO YOU HAVE SIX OR MORE DRINKS ON ONE OCCASION: NEVER
HOW OFTEN DO YOU HAVE A DRINK CONTAINING ALCOHOL: NEVER
SKIP TO QUESTIONS 9-10: 1

## 2023-08-31 ASSESSMENT — SOCIAL DETERMINANTS OF HEALTH (SDOH)
IN A TYPICAL WEEK, HOW MANY TIMES DO YOU TALK ON THE PHONE WITH FAMILY, FRIENDS, OR NEIGHBORS?: MORE THAN THREE TIMES A WEEK
DO YOU BELONG TO ANY CLUBS OR ORGANIZATIONS SUCH AS CHURCH GROUPS UNIONS, FRATERNAL OR ATHLETIC GROUPS, OR SCHOOL GROUPS?: NO
HOW OFTEN DO YOU GET TOGETHER WITH FRIENDS OR RELATIVES?: ONCE A WEEK
HOW OFTEN DO YOU ATTEND CHURCH OR RELIGIOUS SERVICES?: NEVER

## 2023-08-31 NOTE — PATIENT INSTRUCTIONS
Acupuncture - Please let me know how it goes, [employer]    [employer] [place], [facility]    Adore Rios - I'll message her     Decrease levothyroxine to 100mg daily and recheck labs in 6 weeks    Please schedule your mammogram via MyChart    COVID booster this fall    Think about Shingrix vaccine - can get in the pharmacy anytime        Preventive Health Recommendations  Female Ages 50 - 64    Yearly exam: See your health care provider every year in order to  Review health changes.   Discuss preventive care.    Review your medicines if your doctor has prescribed any.    Get a Pap test every three years (unless you have an abnormal result and your provider advises testing more often).  If you get Pap tests with HPV test, you only need to test every 5 years, unless you have an abnormal result.   You do not need a Pap test if your uterus was removed (hysterectomy) and you have not had cancer.  You should be tested each year for STDs (sexually transmitted diseases) if you're at risk.   Have a mammogram every 1 to 2 years.  Have a colonoscopy at age 50, or have a yearly FIT test (stool test). These exams screen for colon cancer.    Have a cholesterol test every 5 years, or more often if advised.  Have a diabetes test (fasting glucose) every three years. If you are at risk for diabetes, you should have this test more often.   If you are at risk for osteoporosis (brittle bone disease), think about having a bone density scan (DEXA).    Shots: Get a flu shot each year. Get a tetanus shot every 10 years.    Nutrition:   Eat at least 5 servings of fruits and vegetables each day.  Eat whole-grain bread, whole-wheat pasta and brown rice instead of white grains and rice.  Get adequate Calcium and Vitamin D.     Lifestyle  Exercise at least 150 minutes a week (30 minutes a day, 5 days a week). This will help you control your weight and prevent disease.  Limit alcohol to one drink per day.  No smoking.   Wear  sunscreen to prevent skin cancer.   See your dentist every six months for an exam and cleaning.  See your eye doctor every 1 to 2 years.

## 2023-08-31 NOTE — PROGRESS NOTES
SUBJECTIVE:   CC: Swetha is an 52 year old who presents for preventive health visit.       8/31/2023     3:02 PM   Additional Questions   Roomed by Pratibha Lockett CMA   Accompanied by FELIX       Healthy Habits:     Getting at least 3 servings of Calcium per day:  Yes    Bi-annual eye exam:  NO    Dental care twice a year:  Yes    Sleep apnea or symptoms of sleep apnea:  Excessive snoring    Diet:  Vegetarian/vegan    Frequency of exercise:  4-5 days/week    Duration of exercise:  30-45 minutes    Taking medications regularly:  Yes    Medication side effects:  Not applicable    Additional concerns today:  Yes          Social History     Tobacco Use    Smoking status: Never    Smokeless tobacco: Never   Substance Use Topics    Alcohol use: No             8/31/2023    12:47 PM   Alcohol Use   Prescreen: >3 drinks/day or >7 drinks/week? Not Applicable     Reviewed orders with patient.  Reviewed health maintenance and updated orders accordingly - Yes  Labs reviewed in EPIC    Breast Cancer Screening:        3/8/2022     8:27 AM   Breast CA Risk Assessment (FHS-7)   Do you have a family history of breast, colon, or ovarian cancer? No / Unknown         Mammogram Screening: Recommended annual mammography  Pertinent mammograms are reviewed under the imaging tab.    History of abnormal Pap smear: NO - age 30- 65 PAP every 3 years recommended      Latest Ref Rng & Units 3/8/2022    11:20 AM 3/16/2015     9:51 AM 3/16/2015    12:00 AM   PAP / HPV   PAP  Negative for Intraepithelial Lesion or Malignancy (NILM)      PAP (Historical)    NIL    HPV 16 DNA Negative Negative  Negative     HPV 18 DNA Negative Negative  Negative     Other HR HPV Negative Negative  Negative       Reviewed and updated as needed this visit by clinical staff   Tobacco  Allergies  Meds              Reviewed and updated as needed this visit by Provider                   Wenceslao vincent- adjusting to changes in life dynamics with son and daughter both out of  "house.  Rediscovering interests, like languages - working on her [place].    Hypothyroidism - would very much like to reduce her levothyroxine dosing if possible.  Concerned about long term effects of thyroid replacement, especially if over treated    DANIELLE - seen by sleep medicine    Hot flashes and night sweats.  Started a couple years ago -   Insomnia getting worse and worse.  She's worked on sleep hygiene and has cut out most caffeine and spicy foods to no avail.     Periods very irregular - now spacing out.  Last 4/6.      Anxiety has become more of an issue.  Strong family hx.   Very interested in counseling and CBT, looking for recommendations. No concern for SI or her personal safety due to mood.    Review of Systems   Constitutional:  Negative for chills and fever.   HENT:  Negative for congestion, ear pain, hearing loss and sore throat.    Eyes:  Negative for pain.   Respiratory:  Negative for cough and shortness of breath.    Cardiovascular:  Negative for chest pain, palpitations and peripheral edema.   Gastrointestinal:  Negative for abdominal pain, constipation, diarrhea, heartburn, hematochezia and nausea.   Breasts:  Negative for tenderness, breast mass and discharge.   Genitourinary:  Negative for dysuria, frequency, genital sores, hematuria, pelvic pain, urgency, vaginal bleeding and vaginal discharge.   Musculoskeletal:  Negative for arthralgias, joint swelling and myalgias.   Skin:  Positive for rash.   Neurological:  Positive for headaches. Negative for dizziness, weakness and paresthesias.   Psychiatric/Behavioral:  Negative for mood changes. The patient is not nervous/anxious.           OBJECTIVE:   /70 (BP Location: Right arm, Patient Position: Sitting, Cuff Size: Adult Regular)   Pulse 80   Temp 98.1  F (36.7  C) (Tympanic)   Resp 16   Ht 1.48 m (4' 10.27\")   Wt 57.5 kg (126 lb 11.2 oz)   LMP 04/06/2023   SpO2 99%   BMI 26.24 kg/m    Wt Readings from Last 4 Encounters:   08/31/23 " 57.5 kg (126 lb 11.2 oz)   05/18/23 58.4 kg (128 lb 12.8 oz)   02/16/23 60.3 kg (133 lb)   05/11/22 57.2 kg (126 lb)       Physical Exam  GENERAL: healthy, alert and no distress  EYES: Eyes grossly normal to inspection, PERRL and conjunctivae and sclerae normal  HENT: ear canals and TM's normal, nose and mouth without ulcers or lesions  NECK: no adenopathy, no asymmetry, masses, or scars and thyroid normal to palpation  RESP: lungs clear to auscultation - no rales, rhonchi or wheezes  CV: regular rate and rhythm, normal S1 S2, no S3 or S4, no murmur, click or rub, no peripheral edema and peripheral pulses strong  ABDOMEN: soft, nontender, no hepatosplenomegaly, no masses and bowel sounds normal  MS: no gross musculoskeletal defects noted, no edema  SKIN: no suspicious lesions or rashes  NEURO: Normal strength and tone, mentation intact and speech normal  PSYCH: mentation appears normal, affect normal/bright    Diagnostic Test Results:  Labs reviewed in Epic  New labs ordered    ASSESSMENT/PLAN:       ICD-10-CM    1. Routine general medical examination at a health care facility  Z00.00 Lipid panel reflex to direct LDL Fasting     Comprehensive metabolic panel (BMP + Alb, Alk Phos, ALT, AST, Total. Bili, TP)      2. Hypothyroidism, unspecified type  E03.9 TSH with free T4 reflex     levothyroxine (SYNTHROID/LEVOTHROID) 100 MCG tablet    TSH suppressed on recent labs - will adjust dosing and recheck in 6-8 weeks.        3. Visit for screening mammogram  Z12.31 MA Screen Bilateral w/Vladimir      4. Anxiety  F41.9 ALPRAZolam (XANAX) 0.5 MG tablet    Patient very interested in starting CBT - recommendations for psychologists provided, she can also access EAP resources through work.  For short term use while getting under control, small # of alprazolam provided as this has been very helpful for multiple family members.  Medication reviewed - patient doesn't drink alcohol and will not drive on this medication.    Patient also  "interested in pursuing acupuncture and recommendations around this provided today.    She will alert me if not improving              COUNSELING:  Reviewed preventive health counseling, as reflected in patient instructions      BMI:   Estimated body mass index is 26.24 kg/m  as calculated from the following:    Height as of this encounter: 1.48 m (4' 10.27\").    Weight as of this encounter: 57.5 kg (126 lb 11.2 oz).   Weight management plan: Discussed healthy diet and exercise guidelines      She reports that she has never smoked. She has never used smokeless tobacco.          Nyasia Wise MD  [facility] RASHID  "

## 2023-09-13 ENCOUNTER — MYC MEDICAL ADVICE (OUTPATIENT)
Dept: PEDIATRICS | Facility: CLINIC | Age: 53
End: 2023-09-13
Payer: COMMERCIAL

## 2023-10-23 ENCOUNTER — LAB (OUTPATIENT)
Dept: LAB | Facility: CLINIC | Age: 53
End: 2023-10-23
Payer: COMMERCIAL

## 2023-10-23 ENCOUNTER — MYC MEDICAL ADVICE (OUTPATIENT)
Dept: PEDIATRICS | Facility: CLINIC | Age: 53
End: 2023-10-23

## 2023-10-23 DIAGNOSIS — Z00.00 ROUTINE GENERAL MEDICAL EXAMINATION AT A HEALTH CARE FACILITY: ICD-10-CM

## 2023-10-23 DIAGNOSIS — E03.9 HYPOTHYROIDISM, UNSPECIFIED TYPE: ICD-10-CM

## 2023-10-23 DIAGNOSIS — E55.9 VITAMIN D DEFICIENCY: Primary | ICD-10-CM

## 2023-10-23 LAB
ALBUMIN SERPL BCG-MCNC: 4.2 G/DL (ref 3.5–5.2)
ALP SERPL-CCNC: 84 U/L (ref 35–104)
ALT SERPL W P-5'-P-CCNC: 22 U/L (ref 0–50)
ANION GAP SERPL CALCULATED.3IONS-SCNC: 9 MMOL/L (ref 7–15)
AST SERPL W P-5'-P-CCNC: 22 U/L (ref 0–45)
BILIRUB SERPL-MCNC: 0.4 MG/DL
BUN SERPL-MCNC: 9.5 MG/DL (ref 6–20)
CALCIUM SERPL-MCNC: 9.4 MG/DL (ref 8.6–10)
CHLORIDE SERPL-SCNC: 105 MMOL/L (ref 98–107)
CHOLEST SERPL-MCNC: 252 MG/DL
CREAT SERPL-MCNC: 0.7 MG/DL (ref 0.51–0.95)
DEPRECATED HCO3 PLAS-SCNC: 26 MMOL/L (ref 22–29)
EGFRCR SERPLBLD CKD-EPI 2021: >90 ML/MIN/1.73M2
GLUCOSE SERPL-MCNC: 82 MG/DL (ref 70–99)
HDLC SERPL-MCNC: 71 MG/DL
LDLC SERPL CALC-MCNC: 166 MG/DL
NONHDLC SERPL-MCNC: 181 MG/DL
POTASSIUM SERPL-SCNC: 4 MMOL/L (ref 3.4–5.3)
PROT SERPL-MCNC: 6.9 G/DL (ref 6.4–8.3)
SODIUM SERPL-SCNC: 140 MMOL/L (ref 135–145)
TRIGL SERPL-MCNC: 76 MG/DL
TSH SERPL DL<=0.005 MIU/L-ACNC: 1.58 UIU/ML (ref 0.3–4.2)
VIT D+METAB SERPL-MCNC: 25 NG/ML (ref 20–50)

## 2023-10-23 PROCEDURE — 80053 COMPREHEN METABOLIC PANEL: CPT

## 2023-10-23 PROCEDURE — 36415 COLL VENOUS BLD VENIPUNCTURE: CPT

## 2023-10-23 PROCEDURE — 82306 VITAMIN D 25 HYDROXY: CPT | Performed by: PEDIATRICS

## 2023-10-23 PROCEDURE — 80061 LIPID PANEL: CPT

## 2023-10-23 PROCEDURE — 84443 ASSAY THYROID STIM HORMONE: CPT

## 2023-11-10 ENCOUNTER — MYC MEDICAL ADVICE (OUTPATIENT)
Dept: PEDIATRICS | Facility: CLINIC | Age: 53
End: 2023-11-10

## 2023-11-10 ENCOUNTER — ANCILLARY PROCEDURE (OUTPATIENT)
Dept: MAMMOGRAPHY | Facility: CLINIC | Age: 53
End: 2023-11-10
Attending: PEDIATRICS
Payer: COMMERCIAL

## 2023-11-10 DIAGNOSIS — Z12.31 VISIT FOR SCREENING MAMMOGRAM: ICD-10-CM

## 2023-11-10 DIAGNOSIS — E03.9 HYPOTHYROIDISM, UNSPECIFIED TYPE: ICD-10-CM

## 2023-11-10 PROCEDURE — 77067 SCR MAMMO BI INCL CAD: CPT | Mod: TC | Performed by: RADIOLOGY

## 2023-11-10 PROCEDURE — 77063 BREAST TOMOSYNTHESIS BI: CPT | Mod: TC | Performed by: RADIOLOGY

## 2023-11-13 RX ORDER — LEVOTHYROXINE SODIUM 100 MCG
100 TABLET ORAL DAILY
Qty: 90 TABLET | Refills: 3 | Status: SHIPPED | OUTPATIENT
Start: 2023-11-13

## 2023-11-13 NOTE — TELEPHONE ENCOUNTER
Patient sending KIHEITAI message, stating refill for thyroid medication should be for brand name. Needs DIANA checked.    Disp Refills Start End DIANA   levothyroxine (SYNTHROID/LEVOTHROID) 100 MCG tablet          Please advise on refills.     Derrick EATON RN 11/13/2023 at 1:41 PM

## 2024-02-09 ENCOUNTER — MYC MEDICAL ADVICE (OUTPATIENT)
Dept: PEDIATRICS | Facility: CLINIC | Age: 54
End: 2024-02-09
Payer: COMMERCIAL

## 2024-02-09 DIAGNOSIS — M25.551 BILATERAL HIP PAIN: ICD-10-CM

## 2024-02-09 DIAGNOSIS — M25.552 BILATERAL HIP PAIN: ICD-10-CM

## 2024-02-09 DIAGNOSIS — M77.12 LATERAL EPICONDYLITIS OF BOTH ELBOWS: Primary | ICD-10-CM

## 2024-02-09 DIAGNOSIS — M77.11 LATERAL EPICONDYLITIS OF BOTH ELBOWS: Primary | ICD-10-CM

## 2024-02-09 NOTE — TELEPHONE ENCOUNTER
Faxed, updated referral. To [facility] [phone]     CHRIS CLINE on 2/9/2024 at 2:23 PM

## 2024-03-01 ENCOUNTER — TRANSFERRED RECORDS (OUTPATIENT)
Dept: HEALTH INFORMATION MANAGEMENT | Facility: CLINIC | Age: 54
End: 2024-03-01
Payer: COMMERCIAL

## 2024-04-12 ENCOUNTER — TELEPHONE (OUTPATIENT)
Dept: PEDIATRICS | Facility: CLINIC | Age: 54
End: 2024-04-12
Payer: COMMERCIAL

## 2024-04-12 NOTE — TELEPHONE ENCOUNTER
General Call    Contacts         Type Contact Phone/Fax    04/12/2024 03:46 PM CDT Fax (Incoming) Ami Caballero [phone]          Reason for Call:  signature needed on PT orders    What are your questions or concerns:  none    Date of last appointment with provider: unknown    Could we send this information to you in [facility] or would you prefer to receive a phone call?:   No preference   Fax orders back to [facility]  Okay to leave a detailed message?: No at Other phone number:[phone] O

## 2024-06-11 ENCOUNTER — E-VISIT (OUTPATIENT)
Dept: PEDIATRICS | Facility: CLINIC | Age: 54
End: 2024-06-11
Payer: COMMERCIAL

## 2024-06-11 ENCOUNTER — MYC MEDICAL ADVICE (OUTPATIENT)
Dept: PEDIATRICS | Facility: CLINIC | Age: 54
End: 2024-06-11

## 2024-06-11 DIAGNOSIS — M13.0 POLYARTHROPATHY: Primary | ICD-10-CM

## 2024-06-11 PROCEDURE — 99421 OL DIG E/M SVC 5-10 MIN: CPT | Performed by: PEDIATRICS

## 2024-06-11 NOTE — TELEPHONE ENCOUNTER
RN recommended E-visit (per clinic policy) in order to address patient request for medical advice.     Gordon Martinez RN on 6/11/2024 at 11:35 AM

## 2024-06-11 NOTE — TELEPHONE ENCOUNTER
Upon chart review, the patient submitted an eVisit.     Lu ROJAS RN   [facility]

## 2024-08-01 ENCOUNTER — PATIENT OUTREACH (OUTPATIENT)
Dept: CARE COORDINATION | Facility: CLINIC | Age: 54
End: 2024-08-01
Payer: COMMERCIAL

## 2024-08-15 ENCOUNTER — PATIENT OUTREACH (OUTPATIENT)
Dept: CARE COORDINATION | Facility: CLINIC | Age: 54
End: 2024-08-15
Payer: COMMERCIAL

## 2024-09-06 ENCOUNTER — HOSPITAL ENCOUNTER (EMERGENCY)
Facility: CLINIC | Age: 54
Discharge: HOME | End: 2024-09-06
Attending: EMERGENCY MEDICINE | Admitting: EMERGENCY MEDICINE
Payer: COMMERCIAL

## 2024-09-06 ENCOUNTER — NURSE TRIAGE (OUTPATIENT)
Dept: PEDIATRICS | Facility: CLINIC | Age: 54
End: 2024-09-06
Payer: COMMERCIAL

## 2024-09-06 ENCOUNTER — APPOINTMENT (OUTPATIENT)
Dept: GENERAL RADIOLOGY | Facility: CLINIC | Age: 54
End: 2024-09-06
Attending: EMERGENCY MEDICINE
Payer: COMMERCIAL

## 2024-09-06 VITALS
TEMPERATURE: 97.8 F | WEIGHT: 127.87 LBS | BODY MASS INDEX: 25.78 KG/M2 | HEIGHT: 59 IN | RESPIRATION RATE: 16 BRPM | SYSTOLIC BLOOD PRESSURE: 160 MMHG | OXYGEN SATURATION: 99 % | DIASTOLIC BLOOD PRESSURE: 95 MMHG | HEART RATE: 72 BPM

## 2024-09-06 DIAGNOSIS — U07.1 COVID-19 VIRUS INFECTION: ICD-10-CM

## 2024-09-06 DIAGNOSIS — R07.9 CHEST PAIN, UNSPECIFIED TYPE: ICD-10-CM

## 2024-09-06 LAB
ALBUMIN SERPL BCG-MCNC: 4.6 G/DL (ref 3.5–5.2)
ALP SERPL-CCNC: 78 U/L (ref 40–150)
ALT SERPL W P-5'-P-CCNC: 19 U/L (ref 0–50)
ANION GAP SERPL CALCULATED.3IONS-SCNC: 11 MMOL/L (ref 7–15)
AST SERPL W P-5'-P-CCNC: 20 U/L (ref 0–45)
BASOPHILS # BLD AUTO: 0 10E3/UL (ref 0–0.2)
BASOPHILS NFR BLD AUTO: 0 %
BILIRUB SERPL-MCNC: 0.3 MG/DL
BUN SERPL-MCNC: 13 MG/DL (ref 6–20)
CALCIUM SERPL-MCNC: 9.3 MG/DL (ref 8.8–10.4)
CHLORIDE SERPL-SCNC: 103 MMOL/L (ref 98–107)
CREAT SERPL-MCNC: 0.69 MG/DL (ref 0.51–0.95)
EGFRCR SERPLBLD CKD-EPI 2021: >90 ML/MIN/1.73M2
EOSINOPHIL # BLD AUTO: 0.2 10E3/UL (ref 0–0.7)
EOSINOPHIL NFR BLD AUTO: 3 %
ERYTHROCYTE [DISTWIDTH] IN BLOOD BY AUTOMATED COUNT: 13 % (ref 10–15)
FLUAV RNA SPEC QL NAA+PROBE: NEGATIVE
FLUBV RNA RESP QL NAA+PROBE: NEGATIVE
GLUCOSE SERPL-MCNC: 89 MG/DL (ref 70–99)
HCO3 SERPL-SCNC: 24 MMOL/L (ref 22–29)
HCT VFR BLD AUTO: 41.8 % (ref 35–47)
HGB BLD-MCNC: 13.7 G/DL (ref 11.7–15.7)
IMM GRANULOCYTES # BLD: 0 10E3/UL
IMM GRANULOCYTES NFR BLD: 0 %
LYMPHOCYTES # BLD AUTO: 2 10E3/UL (ref 0.8–5.3)
LYMPHOCYTES NFR BLD AUTO: 28 %
MCH RBC QN AUTO: 28.1 PG (ref 26.5–33)
MCHC RBC AUTO-ENTMCNC: 32.8 G/DL (ref 31.5–36.5)
MCV RBC AUTO: 86 FL (ref 78–100)
MONOCYTES # BLD AUTO: 0.5 10E3/UL (ref 0–1.3)
MONOCYTES NFR BLD AUTO: 6 %
NEUTROPHILS # BLD AUTO: 4.6 10E3/UL (ref 1.6–8.3)
NEUTROPHILS NFR BLD AUTO: 62 %
NRBC # BLD AUTO: 0 10E3/UL
NRBC BLD AUTO-RTO: 0 /100
PLATELET # BLD AUTO: 250 10E3/UL (ref 150–450)
POTASSIUM SERPL-SCNC: 4 MMOL/L (ref 3.4–5.3)
PROT SERPL-MCNC: 7.4 G/DL (ref 6.4–8.3)
RBC # BLD AUTO: 4.88 10E6/UL (ref 3.8–5.2)
RSV RNA SPEC NAA+PROBE: NEGATIVE
SARS-COV-2 RNA RESP QL NAA+PROBE: POSITIVE
SODIUM SERPL-SCNC: 138 MMOL/L (ref 135–145)
TROPONIN T SERPL HS-MCNC: <6 NG/L
WBC # BLD AUTO: 7.3 10E3/UL (ref 4–11)

## 2024-09-06 PROCEDURE — 36415 COLL VENOUS BLD VENIPUNCTURE: CPT | Performed by: EMERGENCY MEDICINE

## 2024-09-06 PROCEDURE — 93005 ELECTROCARDIOGRAM TRACING: CPT | Performed by: EMERGENCY MEDICINE

## 2024-09-06 PROCEDURE — 82040 ASSAY OF SERUM ALBUMIN: CPT | Performed by: EMERGENCY MEDICINE

## 2024-09-06 PROCEDURE — 99285 EMERGENCY DEPT VISIT HI MDM: CPT | Mod: 25 | Performed by: EMERGENCY MEDICINE

## 2024-09-06 PROCEDURE — 71046 X-RAY EXAM CHEST 2 VIEWS: CPT

## 2024-09-06 PROCEDURE — 84484 ASSAY OF TROPONIN QUANT: CPT | Performed by: EMERGENCY MEDICINE

## 2024-09-06 PROCEDURE — 87637 SARSCOV2&INF A&B&RSV AMP PRB: CPT | Performed by: EMERGENCY MEDICINE

## 2024-09-06 PROCEDURE — 85025 COMPLETE CBC W/AUTO DIFF WBC: CPT | Performed by: EMERGENCY MEDICINE

## 2024-09-06 ASSESSMENT — ACTIVITIES OF DAILY LIVING (ADL)
ADLS_ACUITY_SCORE: 33
ADLS_ACUITY_SCORE: 35
ADLS_ACUITY_SCORE: 35

## 2024-09-06 ASSESSMENT — COLUMBIA-SUICIDE SEVERITY RATING SCALE - C-SSRS
1. IN THE PAST MONTH, HAVE YOU WISHED YOU WERE DEAD OR WISHED YOU COULD GO TO SLEEP AND NOT WAKE UP?: NO
6. HAVE YOU EVER DONE ANYTHING, STARTED TO DO ANYTHING, OR PREPARED TO DO ANYTHING TO END YOUR LIFE?: NO
2. HAVE YOU ACTUALLY HAD ANY THOUGHTS OF KILLING YOURSELF IN THE PAST MONTH?: NO

## 2024-09-06 NOTE — ED PROVIDER NOTES
"    History     Chief Complaint:  Chest Pain       HPI   Swetha Flores is a 53 year old female with left sided chest pain and arm pain.  She has this in the past and attribute to symptom she feels with disrupted sleep as this has been the case 2 nights ago.  However she took aspirin yesterday and today and had normal sleep last night and sx have persisted.  Also  covid + 2 days ago.  No fever or chills or cough.  Runny nose.  No SOB.  No abd pain NVD dysuria.  No leg swelling or pain.        Independent Historian:        Review of External Notes:  PCP note 8/2023  Normal stress 2010.    Medications:    ALPRAZolam (XANAX) 0.5 MG tablet  levothyroxine (SYNTHROID/LEVOTHROID) 100 MCG tablet  SYNTHROID 100 MCG tablet        Past Medical History:    Past Medical History:   Diagnosis Date    Ankle pain 9/1/2009    Anxiety 4/4/2011    Arthropathy, unspecified, site unspecified     Cervicalgia 5/23/2007    Concussion 2017    Hyperlipidemia LDL goal <160 10/31/2010    Hypertension 2004    JOINT PAIN-LOWER LEG 3/21/2007    JOINT PAIN-PELVIS 4/9/2008    Unspecified hypothyroidism        Past Surgical History:    Past Surgical History:   Procedure Laterality Date    [facility] NONSPECIFIC PROCEDURE  2001, 2004    2 NSVDs           Physical Exam   Patient Vitals for the past 24 hrs:   BP Temp Pulse Resp SpO2 Height Weight   09/06/24 1715 -- -- -- -- 99 % -- --   09/06/24 1714 (!) 162/106 -- 70 -- -- -- --   09/06/24 1500 (!) 144/97 97.8  F (36.6  C) 82 16 100 % 1.499 m (4' 11\") 58 kg (127 lb 13.9 oz)        Physical Exam  General: Patient is well appearing. No distress.  Head: Atraumatic.  Eyes: Conjunctivae and EOM are normal. No scleral icterus.  Neck: Normal range of motion. Neck supple.   Cardiovascular: Normal rate, regular rhythm, normal heart sounds and intact distal pulses.   Pulmonary/Chest: Breath sounds normal. No respiratory distress.  Abdominal: Soft. Bowel sounds are normal. No distension. No tenderness. No rebound or " guarding.   Musculoskeletal: Normal range of motion.  Skin: Warm and dry. No rash noted. Not diaphoretic.      Emergency Department Course   ECG  NSR HR 67 no acute injury or morphology changes.      Imaging:  XR Chest 2 Views   Final Result   IMPRESSION: Negative chest.          Laboratory:  Labs Ordered and Resulted from Time of ED Arrival to Time of ED Departure   INFLUENZA A/B, RSV, & SARS-COV2 PCR - Abnormal       Result Value    Influenza A PCR Negative      Influenza B PCR Negative      RSV PCR Negative      SARS CoV2 PCR Positive (*)    COMPREHENSIVE METABOLIC PANEL - Normal    Sodium 138      Potassium 4.0      Carbon Dioxide (CO2) 24      Anion Gap 11      Urea Nitrogen 13.0      Creatinine 0.69      GFR Estimate >90      Calcium 9.3      Chloride 103      Glucose 89      Alkaline Phosphatase 78      AST 20      ALT 19      Protein Total 7.4      Albumin 4.6      Bilirubin Total 0.3     TROPONIN T, HIGH SENSITIVITY - Normal    Troponin T, High Sensitivity <6     CBC WITH PLATELETS AND DIFFERENTIAL    WBC Count 7.3      RBC Count 4.88      Hemoglobin 13.7      Hematocrit 41.8      MCV 86      MCH 28.1      MCHC 32.8      RDW 13.0      Platelet Count 250      % Neutrophils 62      % Lymphocytes 28      % Monocytes 6      % Eosinophils 3      % Basophils 0      % Immature Granulocytes 0      NRBCs per 100 WBC 0      Absolute Neutrophils 4.6      Absolute Lymphocytes 2.0      Absolute Monocytes 0.5      Absolute Eosinophils 0.2      Absolute Basophils 0.0      Absolute Immature Granulocytes 0.0      Absolute NRBCs 0.0          Procedures       Emergency Department Course & Assessments:    Interventions:  Medications - No data to display     Assessments:      Independent Interpretation (X-rays, CTs, rhythm strip):  CXR no effusions pneumonia or abnl.      Consultations/Discussion of Management or Tests:         Social Determinants of Health affecting care:       Disposition:  The patient was  discharged.    Impression & Plan           Medical Decision Making:  Swetha Flores is a 53 year old female presented to the Emergency Department with a complaint of chest pain. Fortunately the workup in the ED has been unremarkable and at this time I am not concerned for ACS. The EKG shows NSR. The troponin is negative after 3days.     I considered other possible causes of chest pain including PE, infection, pneumothorax, aortic dissection, inflammatory conditions (percarditis, etc.) and even more benign causes such as reflux and esophageal motility issues. With patient  covid positive and patient as well testing positive this could likely be a sensation and sx of covid without hypoxia CXR abnormality or serious sequale.  The physical exam, laboratory, and radiological findings listed above make life threatening conditions less likely. At this time I believe the patient is safe for discharge. I have encouraged close outpatient follow up.     Anticipatory guidance given prior to discharge.       Diagnosis:    ICD-10-CM    1. Chest pain, unspecified type  R07.9       2. COVID-19 virus infection  U07.1            Discharge Medications:  New Prescriptions    No medications on file            9/6/2024   Jeffy Taylor MD Stevens, Andrew C, MD  09/06/24 1847

## 2024-09-06 NOTE — TELEPHONE ENCOUNTER
I agree that she should be seen in ER.   Needs in person evaluation prior to any advanced imaging and would recommend she be seen ASAP.    Nyasia Wise MD

## 2024-09-06 NOTE — TELEPHONE ENCOUNTER
Nurse Triage SBAR    Is this a 2nd Level Triage? YES, LICENSED PRACTITIONER REVIEW IS REQUIRED    Situation: Pt calls to report chest pain and request CT scan asap.    Background: Pt also sent Geneluxt message with her request. Pt reports chronic insomnia and states periods of poor sleep can exude physical symptoms including nausea and pain. Pt reports occasionally waking with left sided chest pain with radiation to left arm. Pt reports poor sleep the last 2-3 nights and believes this is the cause of her current chest pain. Pt notes a doctor friend recommended she request a CT scan. Pt denies personal cardiac history but states her father had cardiac problems. Pt states she has had stress ECG in the past which was unremarkable.     Assessment: Pt reports current episode of chest pain began yesterday. Pt describes pain as constant, located at the left chest wall with radiation to left arm and left upper back. Pt rates current pain 4/10. Pt states she took an aspirin last night which she feels may have helped her sleep better. Pt denies SOB, diaphoresis, dizziness and nausea.     Protocol Recommended Disposition:   Call  Now    Recommendation: Recommended pt call 911 EMS now per protocol. Pt refuses disposition and instead requests provider recommendations and CT scan. Reiterated 911 recommendation now and with any new/worsening symptoms. Pt verbalized understanding and again requests provider input. Routing to provider to review and advise.     Routed to provider    Reason for Disposition   Chest pain lasting longer than 5 minutes and ANY of the following:         Pain is crushing, pressure-like, or heavy         Over 44 years old          Over 30 years old and one cardiac risk factor (e.g diabetes, high blood pressure, high cholesterol, smoker, or family history of heart disease)         History of heart disease (e.g. angina, heart attack, heart failure, bypass surgery, takes nitroglycerin)    Protocols used:  Chest Pain-A-OH    Gordon Martinez RN on 9/6/2024 at 12:40 PM

## 2024-09-06 NOTE — ED TRIAGE NOTES
Patient ambulatory to triage reporting chest pain for the past few days. Patient took aspirin this morning, noticed mild relief. Patient's  recently tested positive for Covid, patient denies any symptoms and has been isolating from him. Denies shortness of breath.

## 2024-09-06 NOTE — TELEPHONE ENCOUNTER
Provider Recommendation Follow Up:   Reached patient/caregiver. Informed of provider's recommendations. Patient verbalized understanding and agrees with the plan.     - Informed the patient of Dr. Wise's comments/recommendations   - Patient states that she will go to the Emergency Room at this time     Lu ROJAS RN   [facility]

## 2024-09-07 LAB
ATRIAL RATE - MUSE: 67 BPM
DIASTOLIC BLOOD PRESSURE - MUSE: NORMAL MMHG
INTERPRETATION ECG - MUSE: NORMAL
P AXIS - MUSE: 57 DEGREES
PR INTERVAL - MUSE: 152 MS
QRS DURATION - MUSE: 92 MS
QT - MUSE: 386 MS
QTC - MUSE: 407 MS
R AXIS - MUSE: 30 DEGREES
SYSTOLIC BLOOD PRESSURE - MUSE: NORMAL MMHG
T AXIS - MUSE: 53 DEGREES
VENTRICULAR RATE- MUSE: 67 BPM

## 2024-09-09 ENCOUNTER — PATIENT OUTREACH (OUTPATIENT)
Dept: PEDIATRICS | Facility: CLINIC | Age: 54
End: 2024-09-09
Payer: COMMERCIAL

## 2024-09-09 NOTE — TELEPHONE ENCOUNTER
"Call patient for hospital follow up.  Most Recent Admission Date: 9/6/2024   Most Recent Admission Diagnosis:      Most Recent Discharge Date: 9/6/2024   Most Recent Discharge Diagnosis: Chest pain, unspecified type - R07.9  COVID-19 virus infection - U07.1     Med changes: Medication Changes- None    After Visit Summary follow up recommendations:   \"Follow-Ups: Schedule an appointment with Nyasia Wise MD (Internal Medicine - Pediatrics)\"    Primary care appointment needed within 30days    Primary care hospital follow up appointment has not been made.    Gordon Martinez RN on 9/9/2024 at 11:24 AM        "

## 2024-09-10 NOTE — TELEPHONE ENCOUNTER
ED / Discharge Outreach Protocol    Patient Contact    Attempt # 1    Was call answered?  No.  Left message on voicemail with information to call nurse back.    Leigh BERNAL RN on 9/10/2024 at 11:52 AM

## 2024-09-10 NOTE — TELEPHONE ENCOUNTER
Transitions of Care Outreach  Chief Complaint   Patient presents with    Hospital F/U       Most Recent Admission Date: 9/6/2024   Most Recent Admission Diagnosis:      Most Recent Discharge Date: 9/6/2024   Most Recent Discharge Diagnosis: Chest pain, unspecified type - R07.9  COVID-19 virus infection - U07.1     Transitions of Care Assessment    Discharge Assessment  How are you doing now that you are home?: Chest pain and arm pain fully resolved, still wanting to follow upwith primary care team. Pt denies SOB, dizziness or diaphoresis.  How are your symptoms? (Red Flag symptoms escalate to triage hotline per guidelines): Improved  Do you know how to contact your clinic care team if you have future questions or changes to your health status? : Yes  Does the patient have their discharge instructions? : Yes  Does the patient have questions regarding their discharge instructions? : No  Were you started on any new medications or were there changes to any of your previous medications? : No  Does the patient have all of their medications?: Yes  Do you have questions regarding any of your medications? : No  Do you have all of your needed medical supplies or equipment (DME)?  (i.e. oxygen tank, CPAP, cane, etc.): Yes    Follow up Plan     Discharge Follow-Up  Discharge follow up appointment scheduled in alignment with recommended follow up timeframe or Transitions of Risk Category? (Low = within 30 days; Moderate= within 14 days; High= within 7 days): Yes (Pt reports she will be out of country soon but returned in time for follow up visit.)  Discharge Follow Up Appointment Date: 09/30/24  Discharge Follow Up Appointment Scheduled with?: Primary Care Provider    Future Appointments   Date Time Provider Department Center   9/30/2024  1:30 PM Nyasia Wise MD [facility]       Outpatient Plan as outlined on AVS reviewed with patient.    For any urgent concerns, please contact our 24 hour nurse triage line:  [phone] ([phone])       Gordon Martinez RN

## 2024-09-30 ENCOUNTER — OFFICE VISIT (OUTPATIENT)
Dept: PEDIATRICS | Facility: CLINIC | Age: 54
End: 2024-09-30
Payer: COMMERCIAL

## 2024-09-30 VITALS
RESPIRATION RATE: 16 BRPM | WEIGHT: 126.6 LBS | TEMPERATURE: 98.4 F | HEIGHT: 59 IN | DIASTOLIC BLOOD PRESSURE: 84 MMHG | BODY MASS INDEX: 25.52 KG/M2 | SYSTOLIC BLOOD PRESSURE: 127 MMHG | OXYGEN SATURATION: 99 % | HEART RATE: 73 BPM

## 2024-09-30 DIAGNOSIS — G47.00 INSOMNIA, UNSPECIFIED TYPE: Primary | ICD-10-CM

## 2024-09-30 DIAGNOSIS — Z82.49 FAMILY HISTORY OF ISCHEMIC HEART DISEASE: ICD-10-CM

## 2024-09-30 DIAGNOSIS — E03.9 HYPOTHYROIDISM, UNSPECIFIED TYPE: ICD-10-CM

## 2024-09-30 DIAGNOSIS — R07.9 CHEST PAIN, UNSPECIFIED TYPE: ICD-10-CM

## 2024-09-30 PROCEDURE — 99214 OFFICE O/P EST MOD 30 MIN: CPT | Performed by: PEDIATRICS

## 2024-09-30 RX ORDER — TRAZODONE HYDROCHLORIDE 50 MG/1
50-100 TABLET, FILM COATED ORAL AT BEDTIME
Qty: 60 TABLET | Refills: 3 | Status: SHIPPED | OUTPATIENT
Start: 2024-09-30

## 2024-09-30 ASSESSMENT — PAIN SCALES - GENERAL: PAINLEVEL: NO PAIN (0)

## 2024-09-30 NOTE — PROGRESS NOTES
"  Assessment & Plan       ICD-10-CM    1. Insomnia, unspecified type  G47.00 traZODone (DESYREL) 50 MG tablet    Patient plans to try treating DANIELLE with CPAP, if not effective, will try THC/CBD or trazodone.  Trazodone reviewed today.      Will review at physical this fall.  Patient well versed in sleep hygiene.      2. Hypothyroidism, unspecified type  E03.9 TSH WITH FREE T4 REFLEX     CBC with platelets    Due for repeat, will adjust thyroid replacement as needed based on results      3. Chest pain, unspecified type  R07.9 CT Coronary Calcium Scan     Echocardiogram Exercise Stress    Concern for ischemic etiology - plan additional testing for risk stratification      4. Family history of ischemic heart disease  Z82.49 CT Coronary Calcium Scan     Echocardiogram Exercise Stress     Comprehensive metabolic panel (BMP + Alb, Alk Phos, ALT, AST, Total. Bili, TP)     Lipid panel reflex to direct LDL Fasting                MED REC REQUIRED  Post Medication Reconciliation Status: discharge medications reconciled and changed, per note/orders  BMI  Estimated body mass index is 25.57 kg/m  as calculated from the following:    Height as of this encounter: 1.499 m (4' 11\").    Weight as of this encounter: 57.4 kg (126 lb 9.6 oz).   Weight management plan: Discussed healthy diet and exercise guidelines      See Patient Instructions    Subjective   Swetha is a 53 year old, presenting for the following health issues:  ER F/U        9/30/2024     1:30 PM   Additional Questions   Roomed by maryam   Accompanied by antonieta         9/30/2024     1:30 PM   Patient Reported Additional Medications   Patient reports taking the following new medications na     HPI       ED/UC Followup:    Facility:  [facility] [place] [facility] [place] [facility]  Date of visit: 9/6/2024  Reason for visit: chest pain   Current Status: no concerns currently     + COVID in ER when sent for chest pain    Has had intermittent left sided chest pain with radiation down arm in the past - usually " "associated with poor sleep.   Does have hx of heart disease in father    Hypothyroidism - doing well on current Synthroid prescription without new symptoms of low or high thyroid    Sleep - chronic issue with significant insomnia.  Strong genetic component - mother also struggles.   Melatonin not effective.   Known DANIELLE - mouth guard caused issues with jaw pain - now awaiting CPAP and will give it a trial.               Objective    /84   Pulse 73   Temp 98.4  F (36.9  C) (Temporal)   Resp 16   Ht 1.499 m (4' 11\")   Wt 57.4 kg (126 lb 9.6 oz)   SpO2 99%   BMI 25.57 kg/m    Body mass index is 25.57 kg/m .  Wt Readings from Last 4 Encounters:   09/30/24 57.4 kg (126 lb 9.6 oz)   09/06/24 58 kg (127 lb 13.9 oz)   08/31/23 57.5 kg (126 lb 11.2 oz)   05/18/23 58.4 kg (128 lb 12.8 oz)       Physical Exam   GENERAL: alert and no distress  RESP: lungs clear to auscultation - no rales, rhonchi or wheezes  CV: regular rate and rhythm, normal S1 S2, no S3 or S4, no murmur, click or rub, no peripheral edema   NEURO: Normal strength and tone, mentation intact and speech normal  PSYCH: mentation appears normal, affect normal/bright    ER labs and imaging reviewed - reassuring apart from + Covid        Signed Electronically by: Nyasia Wise MD    "

## 2024-10-04 ENCOUNTER — DOCUMENTATION ONLY (OUTPATIENT)
Dept: SLEEP MEDICINE | Facility: CLINIC | Age: 54
End: 2024-10-04
Payer: COMMERCIAL

## 2024-10-04 DIAGNOSIS — G47.33 OSA (OBSTRUCTIVE SLEEP APNEA): Primary | ICD-10-CM

## 2024-10-04 NOTE — PROGRESS NOTES
Patient was offered choice of vendor and chose [facility].  Patient Swetha Flores was set up at [place] on October 4, 2024. Patient received a Resmed Airsense 11 Pressures were set at  5-15 cm H2O.   Patient s ramp is 5 cm H2O for Auto and FLEX/EPR is EPR, 2.  Patient received a Resmed Mask name: AIRFIT N20  Nasal mask size Medium, heated tubing and heated humidifier.  Patient has the following compliance requirements: using and visit requirements  Patient has a follow up on TBD with Jodi Frost PA-C .    Susan Israel

## 2024-10-07 ENCOUNTER — DOCUMENTATION ONLY (OUTPATIENT)
Dept: SLEEP MEDICINE | Facility: CLINIC | Age: 54
End: 2024-10-07
Payer: COMMERCIAL

## 2024-10-07 NOTE — PROGRESS NOTES
3 day Sleep therapy management telephone visit    Diagnostic AHI:    HST: 6.3        LEFT VOICE MESSAGE FOR PATIENT TO RETURN CALL      Order settings:  CPAP MIN CPAP MAX   5 cm H2O 15 cm H2O         Device settings:  CPAP MIN CPAP MAX EPR RESMED SOFT RESPONSE SETTING   5 cm  H20 15 cm  H20 2 OFF         Patient has the following upcoming sleep appts:      Replacement device: No  STM ordered by provider: Yes     Total time spent on accessing and  interpreting remote patient PAP therapy data  10 minutes    Total time spent counseling, coaching  and reviewing PAP therapy data with patient  0 minutes

## 2024-10-11 ENCOUNTER — PATIENT OUTREACH (OUTPATIENT)
Dept: CARE COORDINATION | Facility: CLINIC | Age: 54
End: 2024-10-11

## 2024-10-11 ENCOUNTER — HOSPITAL ENCOUNTER (OUTPATIENT)
Dept: CARDIOLOGY | Facility: CLINIC | Age: 54
Discharge: HOME | End: 2024-10-11
Attending: PEDIATRICS | Admitting: PEDIATRICS
Payer: COMMERCIAL

## 2024-10-11 DIAGNOSIS — R07.9 CHEST PAIN, UNSPECIFIED TYPE: ICD-10-CM

## 2024-10-11 DIAGNOSIS — Z82.49 FAMILY HISTORY OF ISCHEMIC HEART DISEASE: ICD-10-CM

## 2024-10-11 PROCEDURE — 93350 STRESS TTE ONLY: CPT | Mod: 26 | Performed by: INTERNAL MEDICINE

## 2024-10-11 PROCEDURE — 93017 CV STRESS TEST TRACING ONLY: CPT

## 2024-10-11 PROCEDURE — 93321 DOPPLER ECHO F-UP/LMTD STD: CPT | Mod: 26 | Performed by: INTERNAL MEDICINE

## 2024-10-11 PROCEDURE — 93325 DOPPLER ECHO COLOR FLOW MAPG: CPT | Mod: 26 | Performed by: INTERNAL MEDICINE

## 2024-10-11 PROCEDURE — 93018 CV STRESS TEST I&R ONLY: CPT | Performed by: INTERNAL MEDICINE

## 2024-10-11 PROCEDURE — 93325 DOPPLER ECHO COLOR FLOW MAPG: CPT | Mod: TC

## 2024-10-11 PROCEDURE — 93016 CV STRESS TEST SUPVJ ONLY: CPT | Performed by: INTERNAL MEDICINE

## 2024-10-22 ENCOUNTER — DOCUMENTATION ONLY (OUTPATIENT)
Dept: SLEEP MEDICINE | Facility: CLINIC | Age: 54
End: 2024-10-22
Payer: COMMERCIAL

## 2024-10-22 NOTE — PROGRESS NOTES
14 day Sleep therapy management telephone visit    Diagnostic AHI:    HST:         LEFT VOICE MESSAGE FOR PATIENT TO RETURN CALL      Objective measures: 14 day rolling measures   COMPLIANCE LEAK AHI AVERAGE USE IN MINUTES   7 % 3 0.18 47   GOAL >70% GOAL < 24 LPM GOAL <5 GOAL >240          Device settings:  CPAP MIN CPAP MAX EPR RESMED SOFT RESPONSE SETTING   5.0 cm  H20 15.0 cm  H20 TWO OFF         Patient has the following upcoming sleep appts:      Replacement device: No  STM ordered by provider: Yes     Total time spent on accessing and  interpreting remote patient PAP therapy data  10 minutes    Total time spent counseling, coaching  and reviewing PAP therapy data with patient  0 minutes

## 2024-10-25 ENCOUNTER — THERAPY VISIT (OUTPATIENT)
Dept: PHYSICAL THERAPY | Facility: CLINIC | Age: 54
End: 2024-10-25
Payer: COMMERCIAL

## 2024-10-25 DIAGNOSIS — G89.29 CHRONIC PAIN OF RIGHT KNEE: ICD-10-CM

## 2024-10-25 DIAGNOSIS — M25.561 CHRONIC PAIN OF RIGHT KNEE: ICD-10-CM

## 2024-10-25 DIAGNOSIS — M25.551 RIGHT HIP PAIN: Primary | ICD-10-CM

## 2024-10-25 PROCEDURE — 97110 THERAPEUTIC EXERCISES: CPT | Mod: GP | Performed by: PHYSICAL THERAPIST

## 2024-10-25 PROCEDURE — 97161 PT EVAL LOW COMPLEX 20 MIN: CPT | Mod: GP | Performed by: PHYSICAL THERAPIST

## 2024-10-25 ASSESSMENT — ACTIVITIES OF DAILY LIVING (ADL)
WALKING_DOWN_STEEP_HILLS: EXTREME DIFFICULTY
ADL_TOTAL_ITEM_SCORE: 0
WALKING_15_MINUTES_OR_GREATER: NO DIFFICULTY AT ALL
LIMPING: THE SYMPTOM AFFECTS MY ACTIVITY SLIGHTLY
GO UP STAIRS: ACTIVITY IS SOMEWHAT DIFFICULT
HOW_WOULD_YOU_RATE_YOUR_CURRENT_LEVEL_OF_FUNCTION_DURING_YOUR_USUAL_ACTIVITIES_OF_DAILY_LIVING_FROM_0_TO_100_WITH_100_BEING_YOUR_LEVEL_OF_FUNCTION_PRIOR_TO_YOUR_HIP_PROBLEM_AND_0_BEING_THE_INABILITY_TO_PERFORM_ANY_OF_YOUR_USUAL_DAILY_ACTIVITIES?: 60
SITTING FOR 15 MINUTES: NO DIFFICULTY AT ALL
HOW_WOULD_YOU_RATE_THE_OVERALL_FUNCTION_OF_YOUR_KNEE_DURING_YOUR_USUAL_DAILY_ACTIVITIES?: ABNORMAL
GIVING WAY, BUCKLING OR SHIFTING OF KNEE: THE SYMPTOM AFFECTS MY ACTIVITY MODERATELY
SITTING_FOR_15_MINUTES: NO DIFFICULTY AT ALL
LIGHT_TO_MODERATE_WORK: NO DIFFICULTY AT ALL
HOW_WOULD_YOU_RATE_THE_OVERALL_FUNCTION_OF_YOUR_KNEE_DURING_YOUR_USUAL_DAILY_ACTIVITIES?: ABNORMAL
HOW_WOULD_YOU_RATE_YOUR_CURRENT_LEVEL_OF_FUNCTION_DURING_YOUR_SPORTS_RELATED_ACTIVITIES_FROM_0_TO_100_WITH_100_BEING_YOUR_LEVEL_OF_FUNCTION_PRIOR_TO_YOUR_HIP_PROBLEM_AND_0_BEING_THE_INABILITY_TO_PERFORM_ANY_OF_YOUR_USUAL_DAILY_ACTIVITIES?: 60
AS_A_RESULT_OF_YOUR_KNEE_INJURY,_HOW_WOULD_YOU_RATE_YOUR_CURRENT_LEVEL_OF_DAILY_ACTIVITY?: ABNORMAL
SIT WITH YOUR KNEE BENT: ACTIVITY IS FAIRLY DIFFICULT
GOING DOWN 1 FLIGHT OF STAIRS: MODERATE DIFFICULTY
WEAKNESS: THE SYMPTOM AFFECTS MY ACTIVITY SLIGHTLY
ROLLING OVER IN BED: NO DIFFICULTY AT ALL
LIGHT_TO_MODERATE_WORK: NO DIFFICULTY AT ALL
PLEASE_INDICATE_YOR_PRIMARY_REASON_FOR_REFERRAL_TO_THERAPY:: HIP
SPORTS_TOTAL_ITEM_SCORE: 0
HOS_ADL_ITEM_SCORE_TOTAL: 49
STANDING FOR 15 MINUTES: NO DIFFICULTY AT ALL
PUTTING ON SOCKS AND SHOES: NO DIFFICULTY AT ALL
ADL_HIGHEST_POTENTIAL_SCORE: 68
SPORTS_HIGHEST_POTENTIAL_SCORE: 36
STARTING_AND_STOPPING_QUICKLY: EXTREME DIFFICULTY
HOW_WOULD_YOU_RATE_YOUR_CURRENT_LEVEL_OF_FUNCTION_DURING_YOUR_USUAL_ACTIVITIES_OF_DAILY_LIVING_FROM_0_TO_100_WITH_100_BEING_YOUR_LEVEL_OF_FUNCTION_PRIOR_TO_YOUR_HIP_PROBLEM_AND_0_BEING_THE_INABILITY_TO_PERFORM_ANY_OF_YOUR_USUAL_DAILY_ACTIVITIES?: 60
GOING_DOWN_1_FLIGHT_OF_STAIRS: MODERATE DIFFICULTY
LOW_IMPACT_ACTIVITIES_LIKE_FAST_WALKING: NO DIFFICULTY AT ALL
TWISTING/PIVOTING_ON_INVOLVED_LEG: EXTREME DIFFICULTY
ADL_COUNT: 17
PUTTING_ON_SOCKS_AND_SHOES: NO DIFFICULTY AT ALL
ABILITY_TO_PARTICIPATE_IN_YOUR_DESIRED_SPORT_AS_LONG_AS_YOU_WOULD_LIKE: MODERATE DIFFICULTY
WALKING_APPROXIMATELY_10_MINUTES: NO DIFFICULTY AT ALL
GO DOWN STAIRS: ACTIVITY IS SOMEWHAT DIFFICULT
DEEP_SQUATTING: MODERATE DIFFICULTY
STAND: ACTIVITY IS NOT DIFFICULT
WALK: ACTIVITY IS MINIMALLY DIFFICULT
HEAVY_WORK: MODERATE DIFFICULTY
WEAKNESS: THE SYMPTOM AFFECTS MY ACTIVITY SLIGHTLY
PAIN: THE SYMPTOM AFFECTS MY ACTIVITY MODERATELY
SQUAT: ACTIVITY IS FAIRLY DIFFICULT
HOS_ADL_SCORE(%): 72.06
GOING UP 1 FLIGHT OF STAIRS: MODERATE DIFFICULTY
WALKING_FOR_APPROXIMATELY_10_MINUTES: NO DIFFICULTY AT ALL
PLEASE_INDICATE_YOR_PRIMARY_REASON_FOR_REFERRAL_TO_THERAPY:: KNEE
SWELLING: I DO NOT HAVE THE SYMPTOM
GO UP STAIRS: ACTIVITY IS SOMEWHAT DIFFICULT
WALK: ACTIVITY IS MINIMALLY DIFFICULT
RAW_SCORE: 47
RISE FROM A CHAIR: ACTIVITY IS MINIMALLY DIFFICULT
WALKING_15_MINUTES_OR_GREATER: NO DIFFICULTY AT ALL
WALKING_DOWN_STEEP_HILLS: EXTREME DIFFICULTY
SWELLING: I DO NOT HAVE THE SYMPTOM
KNEE_ACTIVITY_OF_DAILY_LIVING_SCORE: 67.14
GETTING INTO AND OUT OF AN AVERAGE CAR: NO DIFFICULTY AT ALL
TWISTING/PIVOTING ON INVOLVED LEG: EXTREME DIFFICULTY
KNEE_ACTIVITY_OF_DAILY_LIVING_SUM: 47
HOS_ADL_HIGHEST_POTENTIAL_SCORE: 68
GIVING WAY, BUCKLING OR SHIFTING OF KNEE: THE SYMPTOM AFFECTS MY ACTIVITY MODERATELY
STAND: ACTIVITY IS NOT DIFFICULT
RECREATIONAL_ACTIVITIES: MODERATE DIFFICULTY
STEPPING_UP_AND_DOWN_CURBS: NO DIFFICULTY AT ALL
WALKING_INITIALLY: NO DIFFICULTY AT ALL
HEAVY_WORK: MODERATE DIFFICULTY
KNEEL ON THE FRONT OF YOUR KNEE: ACTIVITY IS MINIMALLY DIFFICULT
HOW_WOULD_YOU_RATE_YOUR_CURRENT_LEVEL_OF_FUNCTION?: ABNORMAL
ABILITY_TO_PERFORM_ACTIVITY_WITH_YOUR_NORMAL_TECHNIQUE: SLIGHT DIFFICULTY
LIMPING: THE SYMPTOM AFFECTS MY ACTIVITY SLIGHTLY
SPORTS_SCORE(%): 0
GETTING_INTO_AND_OUT_OF_A_BATHTUB: NO DIFFICULTY AT ALL
RUNNING_ONE_MILE: MODERATE DIFFICULTY
LANDING: SLIGHT DIFFICULTY
STIFFNESS: I DO NOT HAVE THE SYMPTOM
STEPPING UP AND DOWN CURBS: NO DIFFICULTY AT ALL
AS_A_RESULT_OF_YOUR_KNEE_INJURY,_HOW_WOULD_YOU_RATE_YOUR_CURRENT_LEVEL_OF_DAILY_ACTIVITY?: ABNORMAL
ROLLING_OVER_IN_BED: NO DIFFICULTY AT ALL
RECREATIONAL ACTIVITIES: MODERATE DIFFICULTY
GOING_UP_1_FLIGHT_OF_STAIRS: MODERATE DIFFICULTY
SIT WITH YOUR KNEE BENT: ACTIVITY IS FAIRLY DIFFICULT
JUMPING: MODERATE DIFFICULTY
KNEEL ON THE FRONT OF YOUR KNEE: ACTIVITY IS MINIMALLY DIFFICULT
STANDING_FOR_15_MINUTES: NO DIFFICULTY AT ALL
PAIN: THE SYMPTOM AFFECTS MY ACTIVITY MODERATELY
RISE FROM A CHAIR: ACTIVITY IS MINIMALLY DIFFICULT
SWINGING_OBJECTS_LIKE_A_GOLF_CLUB: NO DIFFICULTY AT ALL
STIFFNESS: I DO NOT HAVE THE SYMPTOM
WALKING_UP_STEEP_HILLS: EXTREME DIFFICULTY
GO DOWN STAIRS: ACTIVITY IS SOMEWHAT DIFFICULT
CUTTING/LATERAL_MOVEMENTS: MODERATE DIFFICULTY
GETTING_INTO_AND_OUT_OF_A_BATHTUB: NO DIFFICULTY AT ALL
SPORTS_COUNT: 9
WALKING_INITIALLY: NO DIFFICULTY AT ALL
SQUAT: ACTIVITY IS FAIRLY DIFFICULT
DEEP SQUATTING: MODERATE DIFFICULTY
ADL_SCORE(%): 0
WALKING_UP_STEEP_HILLS: EXTREME DIFFICULTY
GETTING_INTO_AND_OUT_OF_AN_AVERAGE_CAR: NO DIFFICULTY AT ALL

## 2024-10-25 NOTE — PROGRESS NOTES
PHYSICAL THERAPY EVALUATION  Type of Visit: Evaluation       Fall Risk Screen:  Fall screen completed by: PT  Have you fallen 2 or more times in the past year?: (Patient-Rptd) No  Have you fallen and had an injury in the past year?: (Patient-Rptd) No  Is patient a fall risk?: No    Subjective       Presenting condition or subjective complaint: (Patient-Rptd) hip and knee pain due to driving long hours R hip and knee pain, pt commutes for roughly an hour a few days a week and started to notice pain in R hip that radiates down into her knee.  Pt tried some PT at a different location but none of it seemed to help.  Pt has reached a point where she is restricted from driving which is very concerning for her.  Pt likes to go hiking as well, and feels limited with that as well.  Pt does have a history of low back pain when she was pregnant, but doesn't feel to her like it is related to her current condition.   Date of onset:  (chronic)    Relevant medical history:     Dates & types of surgery:      Prior diagnostic imaging/testing results:       Prior therapy history for the same diagnosis, illness or injury: (Patient-Rptd) Yes      Prior Level of Function  Transfers:   Ambulation:   ADL:   IADL:     Living Environment  Social support: (Patient-Rptd) With a significant other or spouse   Type of home: (Patient-Rptd) House; Multi-level   Stairs to enter the home: (Patient-Rptd) No       Ramp: (Patient-Rptd) No   Stairs inside the home: (Patient-Rptd) Yes (Patient-Rptd) 15 Is there a railing: (Patient-Rptd) Yes     Help at home: (Patient-Rptd) Home management tasks (cooking, cleaning); Home and Yard maintenance tasks  Equipment owned:       Employment: (Patient-Rptd) Yes (Patient-Rptd) technology management  Hobbies/Interests: (Patient-Rptd) hiking    Patient goals for therapy: (Patient-Rptd) drive and climb stairs without pain       Objective   KNEE EVALUATION  PAIN: Pain Level at Rest: 1/10  Pain Level with Use: 6/10  Pain  Location: hip and knee  Pain Quality: Aching, Sharp, Stabbing, and Tender  Pain Frequency: intermittent  Pain is Worst: daytime  Pain is Exacerbated By: driving, squatting, stairs  Pain is Relieved By: rest  Pain Progression: Unchanged  INTEGUMENTARY (edema, incisions):   POSTURE:   GAIT:  Weightbearing Status:   Assistive Device(s):   Gait Deviations: WNL  BALANCE/PROPRIOCEPTION:   WEIGHTBEARING ALIGNMENT:   NON-WEIGHTBEARING ALIGNMENT:   ROM:   (Degrees) Left AROM Left PROM  Right AROM Right PROM   Knee Flexion wnl  wnl    Knee Extension wnl  wnl    Hip ROM: R hip ER mild limitation  Pain:   End feel:     STRENGTH:  MMT: quad 5-/5 B, glute med 4/5 B, glute max 4+/5 B  FLEXIBILITY:  milt tightness R>L quadriceps  SPECIAL TESTS:   FUNCTIONAL TESTS:   PALPATION:   JOINT MOBILITY:     Assessment & Plan   CLINICAL IMPRESSIONS  Medical Diagnosis: R hip/knee pain    Treatment Diagnosis: R hip/knee pain   Impression/Assessment: Patient is a 53 year old female with R hip/knee complaints.  The following significant findings have been identified: Pain, Decreased ROM/flexibility, Decreased strength, Inflammation, Impaired muscle performance, Decreased activity tolerance, and Impaired posture. These impairments interfere with their ability to perform self care tasks, work tasks, recreational activities, household chores, driving , household mobility, and community mobility as compared to previous level of function.     Clinical Decision Making (Complexity):  Clinical Presentation: Stable/Uncomplicated  Clinical Presentation Rationale: based on medical and personal factors listed in PT evaluation  Clinical Decision Making (Complexity): Low complexity    PLAN OF CARE  Treatment Interventions:  Interventions: Gait Training, Manual Therapy, Neuromuscular Re-education, Therapeutic Activity, Therapeutic Exercise, Self-Care/Home Management    Long Term Goals     PT Goal 1  Goal Identifier: driving  Goal Description: Pt will be able  to drive 60 min pain free  Rationale: to maximize safety and independence with performance of ADLs and functional tasks  Target Date: 12/20/24      Frequency of Treatment: 1x/week  Duration of Treatment: 8 weeks    Recommended Referrals to Other Professionals:   Education Assessment:   Learner/Method: Patient;Listening;Reading;Demonstration;Pictures/Video  Education Comments: Pt instructed on findings of evaluation, expectations regarding frequency/intensity of HEP.    Risks and benefits of evaluation/treatment have been explained.   Patient/Family/caregiver agrees with Plan of Care.     Evaluation Time:     PT Eval, Low Complexity Minutes (49039): 15     Signing Clinician: Jc Hopkins PT

## 2024-11-01 ENCOUNTER — THERAPY VISIT (OUTPATIENT)
Dept: PHYSICAL THERAPY | Facility: CLINIC | Age: 54
End: 2024-11-01
Payer: COMMERCIAL

## 2024-11-01 DIAGNOSIS — G89.29 CHRONIC PAIN OF RIGHT KNEE: ICD-10-CM

## 2024-11-01 DIAGNOSIS — M25.551 RIGHT HIP PAIN: Primary | ICD-10-CM

## 2024-11-01 DIAGNOSIS — M25.561 CHRONIC PAIN OF RIGHT KNEE: ICD-10-CM

## 2024-11-01 PROCEDURE — 97110 THERAPEUTIC EXERCISES: CPT | Mod: GP | Performed by: PHYSICAL THERAPIST

## 2024-11-08 ENCOUNTER — PATIENT OUTREACH (OUTPATIENT)
Dept: CARE COORDINATION | Facility: CLINIC | Age: 54
End: 2024-11-08

## 2024-11-14 ENCOUNTER — THERAPY VISIT (OUTPATIENT)
Dept: PHYSICAL THERAPY | Facility: CLINIC | Age: 54
End: 2024-11-14
Payer: COMMERCIAL

## 2024-11-14 ENCOUNTER — DOCUMENTATION ONLY (OUTPATIENT)
Dept: SLEEP MEDICINE | Facility: CLINIC | Age: 54
End: 2024-11-14
Payer: COMMERCIAL

## 2024-11-14 DIAGNOSIS — G89.29 CHRONIC PAIN OF RIGHT KNEE: ICD-10-CM

## 2024-11-14 DIAGNOSIS — M25.551 RIGHT HIP PAIN: Primary | ICD-10-CM

## 2024-11-14 DIAGNOSIS — M25.561 CHRONIC PAIN OF RIGHT KNEE: ICD-10-CM

## 2024-11-14 PROCEDURE — 97112 NEUROMUSCULAR REEDUCATION: CPT | Mod: GP | Performed by: PHYSICAL THERAPIST

## 2024-11-14 PROCEDURE — 97110 THERAPEUTIC EXERCISES: CPT | Mod: GP | Performed by: PHYSICAL THERAPIST

## 2024-11-14 NOTE — PROGRESS NOTES
PT RETURNED CPAP ON 10/30 per [facility] due to REASON FOR RETURN: PTS STATES IS CAUSING NAUSEA AND HEADACHES- NO LONGER WANTS TO TRY

## 2024-11-24 DIAGNOSIS — E03.9 HYPOTHYROIDISM, UNSPECIFIED TYPE: ICD-10-CM

## 2024-11-25 RX ORDER — LEVOTHYROXINE SODIUM 100 MCG
100 TABLET ORAL DAILY
Qty: 90 TABLET | Refills: 3 | Status: SHIPPED | OUTPATIENT
Start: 2024-11-25

## 2024-12-03 ENCOUNTER — THERAPY VISIT (OUTPATIENT)
Dept: PHYSICAL THERAPY | Facility: CLINIC | Age: 54
End: 2024-12-03
Payer: COMMERCIAL

## 2024-12-03 DIAGNOSIS — G89.29 CHRONIC PAIN OF RIGHT KNEE: ICD-10-CM

## 2024-12-03 DIAGNOSIS — M25.561 CHRONIC PAIN OF RIGHT KNEE: ICD-10-CM

## 2024-12-03 DIAGNOSIS — M25.551 RIGHT HIP PAIN: Primary | ICD-10-CM

## 2024-12-03 PROCEDURE — 97112 NEUROMUSCULAR REEDUCATION: CPT | Mod: GP | Performed by: PHYSICAL THERAPIST

## 2024-12-03 PROCEDURE — 97110 THERAPEUTIC EXERCISES: CPT | Mod: GP | Performed by: PHYSICAL THERAPIST

## 2024-12-20 PROBLEM — M54.2 NECK PAIN: Status: ACTIVE | Noted: 2024-12-20

## 2025-01-06 DIAGNOSIS — F41.9 ANXIETY: ICD-10-CM

## 2025-01-06 RX ORDER — ALPRAZOLAM 0.5 MG
0.5 TABLET ORAL 2 TIMES DAILY PRN
Qty: 10 TABLET | Refills: 0 | Status: SHIPPED | OUTPATIENT
Start: 2025-01-06

## 2025-01-10 ENCOUNTER — HOSPITAL ENCOUNTER (OUTPATIENT)
Dept: CT IMAGING | Facility: CLINIC | Age: 55
Discharge: HOME | End: 2025-01-10
Attending: PEDIATRICS | Admitting: PEDIATRICS
Payer: COMMERCIAL

## 2025-01-10 DIAGNOSIS — R07.9 CHEST PAIN, UNSPECIFIED TYPE: ICD-10-CM

## 2025-01-10 DIAGNOSIS — Z82.49 FAMILY HISTORY OF ISCHEMIC HEART DISEASE: ICD-10-CM

## 2025-01-10 PROCEDURE — 75571 CT HRT W/O DYE W/CA TEST: CPT

## 2025-01-15 ENCOUNTER — MYC MEDICAL ADVICE (OUTPATIENT)
Dept: PEDIATRICS | Facility: CLINIC | Age: 55
End: 2025-01-15
Payer: COMMERCIAL

## 2025-01-15 DIAGNOSIS — R93.1 ELEVATED CORONARY ARTERY CALCIUM SCORE: Primary | ICD-10-CM

## 2025-01-15 RX ORDER — ASPIRIN 81 MG/1
81 TABLET ORAL DAILY
Qty: 90 TABLET | Refills: 3 | Status: SHIPPED | OUTPATIENT
Start: 2025-01-15

## 2025-01-15 NOTE — TELEPHONE ENCOUNTER
"Dr Wise,     Please see patient response to result note:  CT Calcium Screening:   \"The results show that your Agatston calcium score (as a marker for hard plaque in the coronary arteries) is elevated, most prominently in the left anterior descending artery. Your current scores place you in the 97% percentile compared to age and gender matched peers.     Based on these results, I would like to start you on a cholesterol medication (rosuvastatin) and a baby aspirin to help reduce your cardiovascular risk over time. Given the distribution of the calcium, I would also like to have our cardiologists see you to help make recommendations about additional heart testing and surveillance over time.    Please let me know if it is ok to send the new cholesterol medication, aspirin, and the cardiology referral. \"      - Patient OK with aspirin   - Patient OK with seeing cardiology  - Patient wondering if OK to have retest for cholesterol in 3 months after hard work and exercise?     Leigh Miranda RN    "

## 2025-01-16 NOTE — TELEPHONE ENCOUNTER
Routing to Dr. Wise to review and advise on fish oil. Thanks!    See patient MyChart message.    RN replied via [employer]- advised to schedule lab only appt in 3 months to recheck lipid panel.    Maryam CRUZ RN  [facility]

## 2025-01-20 ENCOUNTER — LAB (OUTPATIENT)
Dept: LAB | Facility: CLINIC | Age: 55
End: 2025-01-20
Payer: COMMERCIAL

## 2025-01-20 ENCOUNTER — OFFICE VISIT (OUTPATIENT)
Dept: CARDIOLOGY | Facility: CLINIC | Age: 55
End: 2025-01-20
Attending: PEDIATRICS
Payer: COMMERCIAL

## 2025-01-20 VITALS
HEART RATE: 60 BPM | DIASTOLIC BLOOD PRESSURE: 99 MMHG | WEIGHT: 126.1 LBS | BODY MASS INDEX: 25.42 KG/M2 | SYSTOLIC BLOOD PRESSURE: 148 MMHG | HEIGHT: 59 IN | OXYGEN SATURATION: 99 %

## 2025-01-20 DIAGNOSIS — I25.10 CORONARY ARTERY DISEASE DUE TO CALCIFIED CORONARY LESION: Primary | ICD-10-CM

## 2025-01-20 DIAGNOSIS — I25.10 CORONARY ARTERY DISEASE DUE TO CALCIFIED CORONARY LESION: ICD-10-CM

## 2025-01-20 DIAGNOSIS — R03.0 ELEVATED BLOOD PRESSURE READING WITHOUT DIAGNOSIS OF HYPERTENSION: ICD-10-CM

## 2025-01-20 DIAGNOSIS — I25.84 CORONARY ARTERY DISEASE DUE TO CALCIFIED CORONARY LESION: ICD-10-CM

## 2025-01-20 DIAGNOSIS — R93.1 ELEVATED CORONARY ARTERY CALCIUM SCORE: ICD-10-CM

## 2025-01-20 DIAGNOSIS — I25.84 CORONARY ARTERY DISEASE DUE TO CALCIFIED CORONARY LESION: Primary | ICD-10-CM

## 2025-01-20 LAB — APO A-I SERPL-MCNC: 110 MG/DL

## 2025-01-20 PROCEDURE — 83695 ASSAY OF LIPOPROTEIN(A): CPT | Performed by: INTERNAL MEDICINE

## 2025-01-20 PROCEDURE — 36415 COLL VENOUS BLD VENIPUNCTURE: CPT | Performed by: INTERNAL MEDICINE

## 2025-01-20 PROCEDURE — 99204 OFFICE O/P NEW MOD 45 MIN: CPT | Performed by: INTERNAL MEDICINE

## 2025-01-20 RX ORDER — FAMOTIDINE 20 MG
TABLET ORAL
COMMUNITY

## 2025-01-20 NOTE — LETTER
2025    Nyasia Wise MD  [address] [place] [address]    RE: Swetha Flores       Dear Colleague,     I had the pleasure of seeing Swetha Flores in the [facility].  HPI and Plan:     I had a pleasure of seeing Swetha Flores in cardiology consultation on request of primary care provider for hyperlipidemia and abnormal coronary calcium scan.    She is originally from [place].  She works as a  for [employer].  She has a sedentary lifestyle.  She has some stress and anxiety in her personal life with her to sons were about 20 years of age.  She usually has intermittent insomnia and when she has that she has been having left-sided chest pain radiating to the arm that is nonexertional.  She has had it for years and usually related to times when she is not able to get good sleep.  It usually is mild in intensity, dull ache, not associated with exertion.  She has had a stress echocardiogram last year for that which was negative for ischemia although had occasional PVCs and short run of SVT during the exercise.    More recently, she was diagnosed with abnormal coronary calcium scan.  She was referred for coronary calcium scan because of family history and hyperlipidemia.  A coronary calcium scan was abnormal with a score of 101, with LAD score of 94.3 and RCA score of 6.54.  This places her in 97th percentile for age and sex matched controls.  In addition, she has been having occasional elevated blood pressures including today.  She is not a diabetic.  Does not smoke.  No significant alcohol use.    Her father had some kind of heart disease and was on Inderal for several years although it was not ever confirmed with an angiogram.  He  at age 81.    She does intermittently exercise including elliptical and has had no exertional symptoms.    On exam, regular rate and rhythm.  No murmurs.  Chest was clear to auscultation.    EKG done by her primary care physician was reviewed by  me and revealed sinus rhythm without ischemic changes.  Stress echo results were reviewed.  Labs including cholesterol profile was reviewed.  LDL was 143.  HDL 71.  Total cholesterol 228.  Triglycerides 72.  She was offered statin but she wants to try diet and exercise for at least few months before trying statin.  Her TSH was normal.    Impression  Coronary artery disease on basis of abnormal coronary calcium   Atypical chest pain, stress echo negative for ischemia, October 2024  Generalized anxiety disorder  Insomnia  Hyperlipidemia  Elevated blood pressure without formal diagnosis of hypertension    Discussion  At this time, we had a long discussion regarding pathophysiology of plaque progression and rupture.  She does have abnormal coronary calcium scan placing the 97 percentile for age and sex matched controls.  She is at high risk of future cardiovascular events and therefore I recommended statin for lowering LDL.  She wants to try diet and exercise first.  She has opportunities to lower intake of saturated fats and wants to pursue that first.  After much discussion, we will take her approach but I told her that if LDL does not improve to less than 100 and preferably less than 70 x 8 to 10 weeks, we would start a statin.  She is agreeable.  In addition, I will check a lipoprotein a to ensure that there is no other genetic predisposing to premature atherosclerosis.  If lipoprotein a is elevated, we may have to start statin now has been tolerated and will be texted 60.    She is willing to continue baby aspirin.  For elevated blood pressure, will monitor it closely.  Have asked her to buy a home instrument and try and monitor blood pressures closely.  We talked about losing weight, lowering salt intake and handling anxiety stress and sleep issues better.    Plan  Continue baby aspirin  Lipid profile in 2 months and if elevated, start statin  Monitor blood pressures closely at home  Check lipoprotein a  Pursue  low-fat diet encouraged regular exercise    Follow-up with me in 2 to 3 months or earlier as needed.    Thank you for allowing us to personally care of this nice patient.  Today's visit I reviewed the EKGs and stress echo done last year by self, labs.  Discussed plan with her and her  was present in the clinic.  Today's medical decision making was of moderate complexity.    Sincerely,    Phillip Devine MD          No orders of the defined types were placed in this encounter.      Orders Placed This Encounter   Medications     Vitamin D, Cholecalciferol, 25 MCG (1000 UT) CAPS     Sig: Take by mouth.       There are no discontinued medications.      Encounter Diagnosis   Name Primary?     Elevated coronary artery calcium score        CURRENT MEDICATIONS:  Current Outpatient Medications   Medication Sig Dispense Refill     ALPRAZolam (XANAX) 0.5 MG tablet TAKE 1 TABLET BY MOUTH 2 TIMES DAILY AS NEEDED FOR ANXIETY. 10 tablet 0     aspirin 81 MG EC tablet Take 1 tablet (81 mg) by mouth daily. 90 tablet 3     SYNTHROID 100 MCG tablet TAKE 1 TABLET BY MOUTH EVERY DAY 90 tablet 3     traZODone (DESYREL) 50 MG tablet Take 1-2 tablets ( mg) by mouth at bedtime. 60 tablet 3     Vitamin D, Cholecalciferol, 25 MCG (1000 UT) CAPS Take by mouth.         ALLERGIES     Allergies   Allergen Reactions     Seasonal Allergies      Barley Grass Rash       PAST MEDICAL HISTORY:  Past Medical History:   Diagnosis Date     Ankle pain 9/1/2009     Anxiety 4/4/2011     Arthropathy, unspecified, site unspecified     has been to PT without much relief.     Cervicalgia 5/23/2007     Concussion 2017     Hyperlipidemia LDL goal <160 10/31/2010     Hypertension 2004    Only during my second pregnancy     JOINT PAIN-LOWER LEG 3/21/2007     JOINT PAIN-PELVIS 4/9/2008     Unspecified hypothyroidism        PAST SURGICAL HISTORY:  Past Surgical History:   Procedure Laterality Date     [facility] NONSPECIFIC PROCEDURE  2001, 2004    2 NSVDs         FAMILY HISTORY:  Family History   Problem Relation Age of Onset     Lipids Mother      Depression Mother      Thyroid Disease Mother      Hyperlipidemia Mother      Asthma Mother      Arthritis Father      Cardiovascular Father      Asthma Son      Breast Cancer No family hx of      Ovarian Cancer No family hx of        SOCIAL HISTORY:  Social History     Socioeconomic History     Marital status:      Spouse name: None     Number of children: None     Years of education: None     Highest education level: None   Tobacco Use     Smoking status: Never     Smokeless tobacco: Never   Vaping Use     Vaping status: Never Used   Substance and Sexual Activity     Alcohol use: No     Drug use: No     Sexual activity: Yes     Partners: Male     Birth control/protection: Condom   Other Topics Concern     Parent/sibling w/ CABG, MI or angioplasty before 65F 55M? No   Social History Narrative            2 children - [employer]     Social Drivers of Health     Financial Resource Strain: Low Risk  (8/31/2023)    Overall Financial Resource Strain (CARDIA)      Difficulty of Paying Living Expenses: Not hard at all   Food Insecurity: No Food Insecurity (8/31/2023)    Hunger Vital Sign      Worried About Running Out of Food in the Last Year: Never true      Ran Out of Food in the Last Year: Never true   Transportation Needs: No Transportation Needs (8/31/2023)    PRAPARE - Transportation      Lack of Transportation (Medical): No      Lack of Transportation (Non-Medical): No   Physical Activity: Insufficiently Active (8/31/2023)    Exercise Vital Sign      Days of Exercise per Week: 4 days      Minutes of Exercise per Session: 30 min   Stress: Stress Concern Present (8/31/2023)    [employer] - Occupational Stress Questionnaire      Feeling of Stress : To some extent   Social Connections: Moderately Isolated (8/31/2023)    Social Connection and Isolation Panel  [NHANES]      Frequency of Communication with Friends and Family: More than three times a week      Frequency of Social Gatherings with Friends and Family: Once a week      Attends Christianity Services: Never      Active Member of Clubs or Organizations: No      Marital Status:    Interpersonal Safety: Low Risk  (9/30/2024)    Interpersonal Safety      Do you feel physically and emotionally safe where you currently live?: Yes      Within the past 12 months, have you been hit, slapped, kicked or otherwise physically hurt by someone?: No      Within the past 12 months, have you been humiliated or emotionally abused in other ways by your partner or ex-partner?: No   Housing Stability: Low Risk  (8/31/2023)    Housing Stability Vital Sign      Unable to Pay for Housing in the Last Year: No      Number of Places Lived in the Last Year: 1      Unstable Housing in the Last Year: No       Review of Systems:  Skin:          Eyes:         ENT:         Respiratory:  Positive for sleep apnea (mild; insurance didn't want to cover sleep applicanes)     Cardiovascular:    Positive for, chest pain (nocturnal pain)    Gastroenterology:        Genitourinary:         Musculoskeletal:         Neurologic:         Psychiatric:  Positive for sleep disturbances    Heme/Lymph/Imm:         Endocrine:  Positive for thyroid disorder (TSH last week)      Physical Exam:  Vitals: Wt 57.2 kg (126 lb 1.6 oz)   BMI 25.47 kg/m          CC  Nyasia Wise MD  [address] [place] [address] [place] [address]                  Thank you for allowing me to participate in the care of your patient.      Sincerely,     Phillip Devine MD     [facility]  cc:   Nyasia Wise MD  [address] [place] [address] [place] [address]

## 2025-01-20 NOTE — PROGRESS NOTES
HPI and Plan:     I had a pleasure of seeing Swetha Flores in cardiology consultation on request of primary care provider for hyperlipidemia and abnormal coronary calcium scan.    She is originally from [place].  She works as a  for [employer].  She has a sedentary lifestyle.  She has some stress and anxiety in her personal life with her to sons were about 20 years of age.  She usually has intermittent insomnia and when she has that she has been having left-sided chest pain radiating to the arm that is nonexertional.  She has had it for years and usually related to times when she is not able to get good sleep.  It usually is mild in intensity, dull ache, not associated with exertion.  She has had a stress echocardiogram last year for that which was negative for ischemia although had occasional PVCs and short run of SVT during the exercise.    More recently, she was diagnosed with abnormal coronary calcium scan.  She was referred for coronary calcium scan because of family history and hyperlipidemia.  A coronary calcium scan was abnormal with a score of 101, with LAD score of 94.3 and RCA score of 6.54.  This places her in 97th percentile for age and sex matched controls.  In addition, she has been having occasional elevated blood pressures including today.  She is not a diabetic.  Does not smoke.  No significant alcohol use.    Her father had some kind of heart disease and was on Inderal for several years although it was not ever confirmed with an angiogram.  He  at age 81.    She does intermittently exercise including elliptical and has had no exertional symptoms.    On exam, regular rate and rhythm.  No murmurs.  Chest was clear to auscultation.    EKG done by her primary care physician was reviewed by me and revealed sinus rhythm without ischemic changes.  Stress echo results were reviewed.  Labs including cholesterol profile was reviewed.  LDL was 143.  HDL 71.  Total cholesterol 228.  Triglycerides  72.  She was offered statin but she wants to try diet and exercise for at least few months before trying statin.  Her TSH was normal.    Impression  Coronary artery disease on basis of abnormal coronary calcium   Atypical chest pain, stress echo negative for ischemia, October 2024  Generalized anxiety disorder  Insomnia  Hyperlipidemia  Elevated blood pressure without formal diagnosis of hypertension    Discussion  At this time, we had a long discussion regarding pathophysiology of plaque progression and rupture.  She does have abnormal coronary calcium scan placing the 97 percentile for age and sex matched controls.  She is at high risk of future cardiovascular events and therefore I recommended statin for lowering LDL.  She wants to try diet and exercise first.  She has opportunities to lower intake of saturated fats and wants to pursue that first.  After much discussion, we will take her approach but I told her that if LDL does not improve to less than 100 and preferably less than 70 x 8 to 10 weeks, we would start a statin.  She is agreeable.  In addition, I will check a lipoprotein a to ensure that there is no other genetic predisposing to premature atherosclerosis.  If lipoprotein a is elevated, we may have to start statin now has been tolerated and will be texted 60.    She is willing to continue baby aspirin.  For elevated blood pressure, will monitor it closely.  Have asked her to buy a home instrument and try and monitor blood pressures closely.  We talked about losing weight, lowering salt intake and handling anxiety stress and sleep issues better.    Plan  Continue baby aspirin  Lipid profile in 2 months and if elevated, start statin  Monitor blood pressures closely at home  Check lipoprotein a  Pursue low-fat diet encouraged regular exercise    Follow-up with me in 2 to 3 months or earlier as needed.    Thank you for allowing us to personally care of this nice patient.  Today's visit I reviewed the EKGs  and stress echo done last year by self, labs.  Discussed plan with her and her  was present in the clinic.  Today's medical decision making was of moderate complexity.    Sincerely,    Phillip Devine MD          No orders of the defined types were placed in this encounter.      Orders Placed This Encounter   Medications    Vitamin D, Cholecalciferol, 25 MCG (1000 UT) CAPS     Sig: Take by mouth.       There are no discontinued medications.      Encounter Diagnosis   Name Primary?    Elevated coronary artery calcium score        CURRENT MEDICATIONS:  Current Outpatient Medications   Medication Sig Dispense Refill    ALPRAZolam (XANAX) 0.5 MG tablet TAKE 1 TABLET BY MOUTH 2 TIMES DAILY AS NEEDED FOR ANXIETY. 10 tablet 0    aspirin 81 MG EC tablet Take 1 tablet (81 mg) by mouth daily. 90 tablet 3    SYNTHROID 100 MCG tablet TAKE 1 TABLET BY MOUTH EVERY DAY 90 tablet 3    traZODone (DESYREL) 50 MG tablet Take 1-2 tablets ( mg) by mouth at bedtime. 60 tablet 3    Vitamin D, Cholecalciferol, 25 MCG (1000 UT) CAPS Take by mouth.         ALLERGIES     Allergies   Allergen Reactions    Seasonal Allergies     Barley Grass Rash       PAST MEDICAL HISTORY:  Past Medical History:   Diagnosis Date    Ankle pain 9/1/2009    Anxiety 4/4/2011    Arthropathy, unspecified, site unspecified     has been to PT without much relief.    Cervicalgia 5/23/2007    Concussion 2017    Hyperlipidemia LDL goal <160 10/31/2010    Hypertension 2004    Only during my second pregnancy    JOINT PAIN-LOWER LEG 3/21/2007    JOINT PAIN-PELVIS 4/9/2008    Unspecified hypothyroidism        PAST SURGICAL HISTORY:  Past Surgical History:   Procedure Laterality Date    ZZC NONSPECIFIC PROCEDURE  2001, 2004    2 NSVDs        FAMILY HISTORY:  Family History   Problem Relation Age of Onset    Lipids Mother     Depression Mother     Thyroid Disease Mother     Hyperlipidemia Mother     Asthma Mother     Arthritis Father     Cardiovascular Father      Asthma Son     Breast Cancer No family hx of     Ovarian Cancer No family hx of        SOCIAL HISTORY:  Social History     Socioeconomic History    Marital status:      Spouse name: None    Number of children: None    Years of education: None    Highest education level: None   Tobacco Use    Smoking status: Never    Smokeless tobacco: Never   Vaping Use    Vaping status: Never Used   Substance and Sexual Activity    Alcohol use: No    Drug use: No    Sexual activity: Yes     Partners: Male     Birth control/protection: Condom   Other Topics Concern    Parent/sibling w/ CABG, MI or angioplasty before 65F 55M? No   Social History Narrative            2 children - [facility]     Social Drivers of Health     Financial Resource Strain: Low Risk  (8/31/2023)    Overall Financial Resource Strain (CARDIA)     Difficulty of Paying Living Expenses: Not hard at all   Food Insecurity: No Food Insecurity (8/31/2023)    Hunger Vital Sign     Worried About Running Out of Food in the Last Year: Never true     Ran Out of Food in the Last Year: Never true   Transportation Needs: No Transportation Needs (8/31/2023)    PRAPARE - Transportation     Lack of Transportation (Medical): No     Lack of Transportation (Non-Medical): No   Physical Activity: Insufficiently Active (8/31/2023)    Exercise Vital Sign     Days of Exercise per Week: 4 days     Minutes of Exercise per Session: 30 min   Stress: Stress Concern Present (8/31/2023)    [place] [employer] - Occupational Stress Questionnaire     Feeling of Stress : To some extent   Social Connections: Moderately Isolated (8/31/2023)    Social Connection and Isolation Panel [NHANES]     Frequency of Communication with Friends and Family: More than three times a week     Frequency of Social Gatherings with Friends and Family: Once a week     Attends Christianity Services: Never     Active Member of Clubs or Organizations: No      Marital Status:    Interpersonal Safety: Low Risk  (9/30/2024)    Interpersonal Safety     Do you feel physically and emotionally safe where you currently live?: Yes     Within the past 12 months, have you been hit, slapped, kicked or otherwise physically hurt by someone?: No     Within the past 12 months, have you been humiliated or emotionally abused in other ways by your partner or ex-partner?: No   Housing Stability: Low Risk  (8/31/2023)    Housing Stability Vital Sign     Unable to Pay for Housing in the Last Year: No     Number of Places Lived in the Last Year: 1     Unstable Housing in the Last Year: No       Review of Systems:  Skin:          Eyes:         ENT:         Respiratory:  Positive for sleep apnea (mild; insurance didn't want to cover sleep applicanes)     Cardiovascular:    Positive for, chest pain (nocturnal pain)    Gastroenterology:        Genitourinary:         Musculoskeletal:         Neurologic:         Psychiatric:  Positive for sleep disturbances    Heme/Lymph/Imm:         Endocrine:  Positive for thyroid disorder (TSH last week)      Physical Exam:  Vitals: Wt 57.2 kg (126 lb 1.6 oz)   BMI 25.47 kg/m            Nyasia Wise MD  [address] [place] [address]

## 2025-01-21 ENCOUNTER — TELEPHONE (OUTPATIENT)
Dept: CARDIOLOGY | Facility: CLINIC | Age: 55
End: 2025-01-21
Payer: COMMERCIAL

## 2025-01-21 DIAGNOSIS — E78.5 HYPERLIPIDEMIA LDL GOAL <100: Primary | ICD-10-CM

## 2025-01-21 NOTE — TELEPHONE ENCOUNTER
Phillip Devine MD Schwartz, Tammy J, RN  Lipoprotein a significantly elevated.  I would suggest that we start statin now and not wait for 3 months.  I would like to start rosuvastatin 20 mg/day.  She can keep the appointment with me in few weeks with lipid profile follow-up.  Since her lipoprotein is available she needs to ask her siblings as well as kids to also get screened for the same.      HERMES Sherman RN

## 2025-01-22 ENCOUNTER — MYC MEDICAL ADVICE (OUTPATIENT)
Dept: CARDIOLOGY | Facility: CLINIC | Age: 55
End: 2025-01-22
Payer: COMMERCIAL

## 2025-01-22 ENCOUNTER — TELEPHONE (OUTPATIENT)
Dept: CARDIOLOGY | Facility: CLINIC | Age: 55
End: 2025-01-22
Payer: COMMERCIAL

## 2025-01-22 RX ORDER — ROSUVASTATIN CALCIUM 20 MG/1
20 TABLET, COATED ORAL DAILY
Qty: 30 TABLET | Refills: 3 | Status: SHIPPED | OUTPATIENT
Start: 2025-01-22

## 2025-01-22 NOTE — TELEPHONE ENCOUNTER
Spoke with Pt did review medication rosuvastatin, and sent RX to pharmacy. Pt has labs and Office visit already scheduled. HERMES Sherman RN

## 2025-01-22 NOTE — TELEPHONE ENCOUNTER
[facility] Call Center    Phone Message    May a detailed message be left on voicemail: yes     Reason for Call: Other: The patient said that after her testing, Dr Devine said that a nurse would call her back with results and to discuss any recommendations, please send my chart message.     The patient also asked if she could get an a order to recheck her Lipoprotein (A).    Action Taken: Other: Cardiology    Travel Screening: Not Applicable     Thank you!  [facility]      Date of Service:

## 2025-01-31 ENCOUNTER — MYC MEDICAL ADVICE (OUTPATIENT)
Dept: CARDIOLOGY | Facility: CLINIC | Age: 55
End: 2025-01-31

## 2025-01-31 DIAGNOSIS — R07.89 ATYPICAL CHEST PAIN: ICD-10-CM

## 2025-01-31 DIAGNOSIS — I25.10 CAD (CORONARY ARTERY DISEASE): Primary | ICD-10-CM

## 2025-02-02 ENCOUNTER — HEALTH MAINTENANCE LETTER (OUTPATIENT)
Age: 55
End: 2025-02-02

## 2025-02-07 ENCOUNTER — E-VISIT (OUTPATIENT)
Dept: PEDIATRICS | Facility: CLINIC | Age: 55
End: 2025-02-07
Payer: COMMERCIAL

## 2025-02-07 DIAGNOSIS — M13.0 POLYARTHRITIS: Primary | ICD-10-CM

## 2025-02-07 NOTE — TELEPHONE ENCOUNTER
Attempted to call patient to evaluate complaint of chest pain, left message for patient to call back.  Patient was advised via MyChart to go to the ER if she continues to have chest pain.  JOSE Pizarro RN

## 2025-02-10 NOTE — TELEPHONE ENCOUNTER
We can proceed with CT coronary angiography with diagnosis of chest pain and coronary artery disease.

## 2025-02-11 NOTE — TELEPHONE ENCOUNTER
What is her heart rate.  If the heart rate is greater than 65, give 50 mg of metoprolol tartrate once.

## 2025-02-27 ENCOUNTER — PATIENT OUTREACH (OUTPATIENT)
Dept: CARE COORDINATION | Facility: CLINIC | Age: 55
End: 2025-02-27
Payer: COMMERCIAL

## 2025-03-07 ENCOUNTER — HOSPITAL ENCOUNTER (OUTPATIENT)
Dept: CARDIOLOGY | Facility: CLINIC | Age: 55
Discharge: HOME | End: 2025-03-07
Attending: INTERNAL MEDICINE | Admitting: INTERNAL MEDICINE
Payer: COMMERCIAL

## 2025-03-07 VITALS — DIASTOLIC BLOOD PRESSURE: 81 MMHG | HEART RATE: 59 BPM | SYSTOLIC BLOOD PRESSURE: 146 MMHG

## 2025-03-07 DIAGNOSIS — I25.10 CAD (CORONARY ARTERY DISEASE): ICD-10-CM

## 2025-03-07 DIAGNOSIS — R07.89 ATYPICAL CHEST PAIN: ICD-10-CM

## 2025-03-07 PROCEDURE — 250N000011 HC RX IP 250 OP 636: Performed by: INTERNAL MEDICINE

## 2025-03-07 PROCEDURE — 75574 CT ANGIO HRT W/3D IMAGE: CPT

## 2025-03-07 PROCEDURE — 250N000013 HC RX MED GY IP 250 OP 250 PS 637: Performed by: INTERNAL MEDICINE

## 2025-03-07 RX ORDER — IVABRADINE 5 MG/1
5-15 TABLET, FILM COATED ORAL
Status: DISCONTINUED | OUTPATIENT
Start: 2025-03-07 | End: 2025-03-08 | Stop reason: HOSPADM

## 2025-03-07 RX ORDER — DILTIAZEM HCL 60 MG
120 TABLET ORAL
Status: DISCONTINUED | OUTPATIENT
Start: 2025-03-07 | End: 2025-03-08 | Stop reason: HOSPADM

## 2025-03-07 RX ORDER — DILTIAZEM HYDROCHLORIDE 5 MG/ML
10-15 INJECTION INTRAVENOUS
Status: DISCONTINUED | OUTPATIENT
Start: 2025-03-07 | End: 2025-03-08 | Stop reason: HOSPADM

## 2025-03-07 RX ORDER — METOPROLOL TARTRATE 25 MG/1
25-100 TABLET, FILM COATED ORAL
Status: DISCONTINUED | OUTPATIENT
Start: 2025-03-07 | End: 2025-03-08 | Stop reason: HOSPADM

## 2025-03-07 RX ORDER — ONDANSETRON 2 MG/ML
4 INJECTION INTRAMUSCULAR; INTRAVENOUS
Status: DISCONTINUED | OUTPATIENT
Start: 2025-03-07 | End: 2025-03-08 | Stop reason: HOSPADM

## 2025-03-07 RX ORDER — METOPROLOL TARTRATE 1 MG/ML
5-20 INJECTION, SOLUTION INTRAVENOUS
Status: DISCONTINUED | OUTPATIENT
Start: 2025-03-07 | End: 2025-03-08 | Stop reason: HOSPADM

## 2025-03-07 RX ORDER — IOPAMIDOL 755 MG/ML
500 INJECTION, SOLUTION INTRAVASCULAR ONCE
Status: COMPLETED | OUTPATIENT
Start: 2025-03-07 | End: 2025-03-07

## 2025-03-07 RX ORDER — LIDOCAINE 40 MG/G
CREAM TOPICAL
Status: DISCONTINUED | OUTPATIENT
Start: 2025-03-07 | End: 2025-03-08 | Stop reason: HOSPADM

## 2025-03-07 RX ORDER — NITROGLYCERIN 0.4 MG/1
0.4 TABLET SUBLINGUAL
Status: DISCONTINUED | OUTPATIENT
Start: 2025-03-07 | End: 2025-03-08 | Stop reason: HOSPADM

## 2025-03-07 RX ADMIN — NITROGLYCERIN 0.4 MG: 0.4 TABLET SUBLINGUAL at 09:32

## 2025-03-07 RX ADMIN — IOPAMIDOL 100 ML: 755 INJECTION, SOLUTION INTRAVENOUS at 09:46

## 2025-03-11 ENCOUNTER — VIRTUAL VISIT (OUTPATIENT)
Dept: CARDIOLOGY | Facility: CLINIC | Age: 55
End: 2025-03-11
Attending: INTERNAL MEDICINE
Payer: COMMERCIAL

## 2025-03-11 DIAGNOSIS — R03.0 ELEVATED BLOOD-PRESSURE READING WITHOUT DIAGNOSIS OF HYPERTENSION: ICD-10-CM

## 2025-03-11 DIAGNOSIS — I25.84 CORONARY ARTERY DISEASE DUE TO CALCIFIED CORONARY LESION: Primary | ICD-10-CM

## 2025-03-11 DIAGNOSIS — I25.10 CORONARY ARTERY DISEASE DUE TO CALCIFIED CORONARY LESION: Primary | ICD-10-CM

## 2025-03-11 DIAGNOSIS — R93.1 ELEVATED CORONARY ARTERY CALCIUM SCORE: ICD-10-CM

## 2025-03-11 DIAGNOSIS — E55.9 VITAMIN D DEFICIENCY: ICD-10-CM

## 2025-03-11 DIAGNOSIS — E78.41 ELEVATED LIPOPROTEIN A LEVEL: ICD-10-CM

## 2025-03-11 DIAGNOSIS — E78.5 HYPERLIPIDEMIA LDL GOAL <70: ICD-10-CM

## 2025-03-11 NOTE — PROGRESS NOTES
"Swetha is a 54 year old who is being evaluated via a billable video visit.    Vitals - Patient Reported  Systolic (Patient Reported): (!) 157  Diastolic (Patient Reported): (!) 100  Weight (Patient Reported): 54.4 kg (120 lb)  Height (Patient Reported): 149.9 cm (4' 11\")  BMI (Based on Pt Reported Ht/Wt): 24.24  Pulse (Patient Reported): 58      BP reading 2/25/25:  137/94     Review Of Systems  Skin: NEGATIVE  Eyes:Ears/Nose/Throat: NEGATIVE  Respiratory: NEGATIVE  Cardiovascular: weight loss since last OV, following heart healthy diet  Gastrointestinal: NEGATIVE  Genitourinary:NEGATIVE   Musculoskeletal: NEGATIVE  Neurologic: NEGATIVE  Psychiatric: NEGATIVE  Hematologic/Lymphatic/Immunologic: NEGATIVE  Endocrine:  thyroid disease    Indiana Pak LPN  What phone number would you like to be contacted at? [phone]  How would you like to obtain your AVS? [employer]    Video-Visit Details    Type of service:  Video Visit   Originating Location (pt. Location): Home    Distant Location (provider location):  On-site  Platform used for Video Visit: Gabriel REINA note    I had a pleasure of seeing Swetha Flores in cardiology clinic in follow-up after her CT coronary angiography.  This was a virtual video visit.      She is originally from [place].  She works as a  for [employer].  She has a sedentary lifestyle.  She has some stress and anxiety in her personal life with her to sons were about 20 years of age.   She has had a stress echocardiogram last year for that which was negative for ischemia although had occasional PVCs and short run of SVT during the exercise.  She had a CT calcium score with a score of 101 which places her in 97 percentile.  We did a CT coronary angiography and I reviewed the results with her today.  It showed mild stenosis in the proximal and mid LAD due to calcified plaque.  No significant obstructive disease was noted.    Since our last visit, she has started a healthy diet and has signed " up with [facility] healthy diet service.  She gets weekly information of new recipes that they are trying to follow.  She also has increased her exercise ability and reduce her stress.  She also had an elevated lipoprotein a at heart and tenderness started rosuvastatin 20 mg/day on my recommendations and so far is tolerating well.  She has a lipid profile coming up in March.  Her LDL in January was 147 HDL was 71.    Her blood pressures at home have been elevated intermittently but she was holding up for the appointment today and trying to connect virtually and therefore was a bit anxious.  I have asked her to monitor blood pressures closely as well as take it to nearby pharmacy to confirm that it is accurate machine or can come to our clinic.    She has also informed her first-degree relatives regarding her elevated lipoprotein a so they can get screened for it    On virtual visit, she appeared alert and oriented x 3 without any visible signs of distress.  No JVP seen in sitting position.       Impression  Coronary artery disease on basis of abnormal coronary calcium and mild stenosis of less than 25% on CT coronary angiography  Atypical chest pain, resolved  Generalized anxiety disorder  Insomnia  Hyperlipidemia and elevated lipoprotein a  Elevated blood pressure without formal diagnosis of hypertension    Discussion  Today we discussed importance of continuing a low-fat diet and heart healthy diet and regular exercise.  A target LDL would be less than 60 given her elevated lipoprotein a.  I reviewed with her that there are several trials going on to treat lipoprotein a but none of them have been completed.  Meanwhile, she will continue rosuvastatin and repeat a lipid profile end of March and we will see her back in April as a virtual video visit on her request.  With respect to her hypertension or elevated blood pressure that we are suspecting, she will continue monitor blood pressures closely especially after  reducing salt intake.  If blood pressure remains high, I would have low threshold to start her on antihypertensive.    Thank you allowing us to personally care of nice patient.  At today's visit I reviewed the CT coronary angiography results and labs.  Today's medical decision making was of moderate complexity.    The longitudinal plan of care for the diagnosis(es)/condition(s) as documented were addressed during this visit. Due to the added complexity in care, I will continue to support Swetha in the subsequent management and with ongoing continuity of care.     Sincerely,    Phillip Devine MD

## 2025-03-11 NOTE — LETTER
"3/11/2025    Nyasia Wise MD  [address] [place] [address]    RE: Swetha Flores       Dear Colleague,     I had the pleasure of seeing Swetha Flores in the [facility].  Swetha is a 54 year old who is being evaluated via a billable video visit.    Vitals - Patient Reported  Systolic (Patient Reported): (!) 157  Diastolic (Patient Reported): (!) 100  Weight (Patient Reported): 54.4 kg (120 lb)  Height (Patient Reported): 149.9 cm (4' 11\")  BMI (Based on Pt Reported Ht/Wt): 24.24  Pulse (Patient Reported): 58      BP reading 2/25/25:  137/94     Review Of Systems  Skin: NEGATIVE  Eyes:Ears/Nose/Throat: NEGATIVE  Respiratory: NEGATIVE  Cardiovascular: weight loss since last OV, following heart healthy diet  Gastrointestinal: NEGATIVE  Genitourinary:NEGATIVE   Musculoskeletal: NEGATIVE  Neurologic: NEGATIVE  Psychiatric: NEGATIVE  Hematologic/Lymphatic/Immunologic: NEGATIVE  Endocrine:  thyroid disease    Indiana Pak LPN  What phone number would you like to be contacted at? [phone]  How would you like to obtain your AVS? [employer]    Video-Visit Details    Type of service:  Video Visit   Originating Location (pt. Location): Home    Distant Location (provider location):  On-site  Platform used for Video Visit: Gabriel REINA note    I had a pleasure of seeing Swetha Flores in cardiology clinic in follow-up after her CT coronary angiography.  This was a virtual video visit.      She is originally from [place].  She works as a  for [employer].  She has a sedentary lifestyle.  She has some stress and anxiety in her personal life with her to sons were about 20 years of age.   She has had a stress echocardiogram last year for that which was negative for ischemia although had occasional PVCs and short run of SVT during the exercise.  She had a CT calcium score with a score of 101 which places her in 97 percentile.  We did a CT coronary angiography and I reviewed the results with her " today.  It showed mild stenosis in the proximal and mid LAD due to calcified plaque.  No significant obstructive disease was noted.    Since our last visit, she has started a healthy diet and has signed up with [facility] healthy diet service.  She gets weekly information of new recipes that they are trying to follow.  She also has increased her exercise ability and reduce her stress.  She also had an elevated lipoprotein a at heart and tenderness started rosuvastatin 20 mg/day on my recommendations and so far is tolerating well.  She has a lipid profile coming up in March.  Her LDL in January was 147 HDL was 71.    Her blood pressures at home have been elevated intermittently but she was holding up for the appointment today and trying to connect virtually and therefore was a bit anxious.  I have asked her to monitor blood pressures closely as well as take it to nearby pharmacy to confirm that it is accurate machine or can come to our clinic.    She has also informed her first-degree relatives regarding her elevated lipoprotein a so they can get screened for it    On virtual visit, she appeared alert and oriented x 3 without any visible signs of distress.  No JVP seen in sitting position.       Impression  Coronary artery disease on basis of abnormal coronary calcium and mild stenosis of less than 25% on CT coronary angiography  Atypical chest pain, resolved  Generalized anxiety disorder  Insomnia  Hyperlipidemia and elevated lipoprotein a  Elevated blood pressure without formal diagnosis of hypertension    Discussion  Today we discussed importance of continuing a low-fat diet and heart healthy diet and regular exercise.  A target LDL would be less than 60 given her elevated lipoprotein a.  I reviewed with her that there are several trials going on to treat lipoprotein a but none of them have been completed.  Meanwhile, she will continue rosuvastatin and repeat a lipid profile end of March and we will see her back  in April as a virtual video visit on her request.  With respect to her hypertension or elevated blood pressure that we are suspecting, she will continue monitor blood pressures closely especially after reducing salt intake.  If blood pressure remains high, I would have low threshold to start her on antihypertensive.    Thank you allowing us to personally care of nice patient.  At today's visit I reviewed the CT coronary angiography results and labs.  Today's medical decision making was of moderate complexity.    The longitudinal plan of care for the diagnosis(es)/condition(s) as documented were addressed during this visit. Due to the added complexity in care, I will continue to support Swetha in the subsequent management and with ongoing continuity of care.     Sincerely,    Phillip Devine MD      Thank you for allowing me to participate in the care of your patient.      Sincerely,     Phillip Devine MD     [facility]  cc:   Phillip Devine MD  [address]  [place] [address]

## 2025-03-19 PROBLEM — M25.561 CHRONIC PAIN OF RIGHT KNEE: Status: RESOLVED | Noted: 2024-10-25 | Resolved: 2025-03-19

## 2025-03-19 PROBLEM — M25.551 RIGHT HIP PAIN: Status: RESOLVED | Noted: 2024-10-25 | Resolved: 2025-03-19

## 2025-03-19 PROBLEM — G89.29 CHRONIC PAIN OF RIGHT KNEE: Status: RESOLVED | Noted: 2024-10-25 | Resolved: 2025-03-19

## 2025-03-19 PROBLEM — M54.2 NECK PAIN: Status: RESOLVED | Noted: 2024-12-20 | Resolved: 2025-03-19

## 2025-03-31 ENCOUNTER — LAB (OUTPATIENT)
Dept: LAB | Facility: CLINIC | Age: 55
End: 2025-03-31
Payer: COMMERCIAL

## 2025-03-31 ENCOUNTER — MYC MEDICAL ADVICE (OUTPATIENT)
Dept: PEDIATRICS | Facility: CLINIC | Age: 55
End: 2025-03-31

## 2025-03-31 DIAGNOSIS — R93.1 ELEVATED CORONARY ARTERY CALCIUM SCORE: ICD-10-CM

## 2025-03-31 DIAGNOSIS — R93.1 ELEVATED CORONARY ARTERY CALCIUM SCORE: Primary | ICD-10-CM

## 2025-03-31 LAB
ALT SERPL W P-5'-P-CCNC: 38 U/L (ref 0–50)
CHOLEST SERPL-MCNC: 143 MG/DL
FASTING STATUS PATIENT QL REPORTED: YES
HDLC SERPL-MCNC: 65 MG/DL
LDLC SERPL CALC-MCNC: 68 MG/DL
NONHDLC SERPL-MCNC: 78 MG/DL
TRIGL SERPL-MCNC: 52 MG/DL

## 2025-03-31 PROCEDURE — 84460 ALANINE AMINO (ALT) (SGPT): CPT | Performed by: PEDIATRICS

## 2025-03-31 PROCEDURE — 36415 COLL VENOUS BLD VENIPUNCTURE: CPT

## 2025-03-31 PROCEDURE — 80061 LIPID PANEL: CPT

## 2025-04-28 ENCOUNTER — TRANSFERRED RECORDS (OUTPATIENT)
Dept: HEALTH INFORMATION MANAGEMENT | Facility: CLINIC | Age: 55
End: 2025-04-28
Payer: COMMERCIAL

## 2025-05-13 ENCOUNTER — TELEPHONE (OUTPATIENT)
Dept: CARDIOLOGY | Facility: CLINIC | Age: 55
End: 2025-05-13
Payer: COMMERCIAL

## 2025-05-13 NOTE — TELEPHONE ENCOUNTER
Returned call did go to voice mail. Left message nurse will review labs with provider if Pt does them now, and office visit pushed out. Left phone number for questions. [phone]. HERMES Sherman RN

## 2025-05-13 NOTE — TELEPHONE ENCOUNTER
[facility] Call Center    Phone Message    May a detailed message be left on voicemail: yes     Reason for Call: Other: Dr. Devine wants to reevaluate the mediation the pt is on and see her in July.  Original appt was canceled and nothing else is available.  Please call pt back.     Action Taken: Message routed to:  [facility] cardio    Travel Screening: Not Applicable    Thank you!  [facility] Center       Date of Service:

## 2025-05-23 ENCOUNTER — HOSPITAL ENCOUNTER (OUTPATIENT)
Dept: MRI IMAGING | Facility: CLINIC | Age: 55
Discharge: HOME | End: 2025-05-23
Attending: INTERNAL MEDICINE
Payer: COMMERCIAL

## 2025-05-23 DIAGNOSIS — M25.561 PAIN IN RIGHT KNEE: ICD-10-CM

## 2025-05-23 DIAGNOSIS — M25.521 PAIN IN RIGHT ELBOW: ICD-10-CM

## 2025-05-23 PROCEDURE — 73721 MRI JNT OF LWR EXTRE W/O DYE: CPT | Mod: RT

## 2025-05-27 ENCOUNTER — OFFICE VISIT (OUTPATIENT)
Dept: SLEEP MEDICINE | Facility: CLINIC | Age: 55
End: 2025-05-27
Payer: COMMERCIAL

## 2025-05-27 VITALS
DIASTOLIC BLOOD PRESSURE: 71 MMHG | WEIGHT: 120 LBS | HEART RATE: 67 BPM | HEIGHT: 59 IN | SYSTOLIC BLOOD PRESSURE: 109 MMHG | RESPIRATION RATE: 15 BRPM | BODY MASS INDEX: 24.19 KG/M2 | OXYGEN SATURATION: 98 %

## 2025-05-27 DIAGNOSIS — G47.00 INSOMNIA, UNSPECIFIED TYPE: Primary | ICD-10-CM

## 2025-05-27 PROCEDURE — 99204 OFFICE O/P NEW MOD 45 MIN: CPT | Performed by: INTERNAL MEDICINE

## 2025-05-27 PROCEDURE — 3078F DIAST BP <80 MM HG: CPT | Performed by: INTERNAL MEDICINE

## 2025-05-27 PROCEDURE — 3074F SYST BP LT 130 MM HG: CPT | Performed by: INTERNAL MEDICINE

## 2025-05-27 ASSESSMENT — SLEEP AND FATIGUE QUESTIONNAIRES
HOW LIKELY ARE YOU TO NOD OFF OR FALL ASLEEP WHILE SITTING INACTIVE IN A PUBLIC PLACE: WOULD NEVER DOZE
HOW LIKELY ARE YOU TO NOD OFF OR FALL ASLEEP WHILE WATCHING TV: MODERATE CHANCE OF DOZING
HOW LIKELY ARE YOU TO NOD OFF OR FALL ASLEEP WHEN YOU ARE A PASSENGER IN A CAR FOR AN HOUR WITHOUT A BREAK: MODERATE CHANCE OF DOZING
HOW LIKELY ARE YOU TO NOD OFF OR FALL ASLEEP WHILE LYING DOWN TO REST IN THE AFTERNOON WHEN CIRCUMSTANCES PERMIT: HIGH CHANCE OF DOZING
HOW LIKELY ARE YOU TO NOD OFF OR FALL ASLEEP IN A CAR, WHILE STOPPED FOR A FEW MINUTES IN TRAFFIC: WOULD NEVER DOZE
HOW LIKELY ARE YOU TO NOD OFF OR FALL ASLEEP WHILE SITTING QUIETLY AFTER LUNCH WITHOUT ALCOHOL: SLIGHT CHANCE OF DOZING
HOW LIKELY ARE YOU TO NOD OFF OR FALL ASLEEP WHILE SITTING AND TALKING TO SOMEONE: WOULD NEVER DOZE
HOW LIKELY ARE YOU TO NOD OFF OR FALL ASLEEP WHILE SITTING AND READING: SLIGHT CHANCE OF DOZING

## 2025-05-27 NOTE — PROGRESS NOTES
"[facility]   Outpatient Sleep Medicine Follow-up Visit  May 27, 2025    Name: Swetha Flores MRN# [id number]   Age: 54 year old YOB: 1970     Date of Consultation: May 27, 2025  Consultation is requested by: No referring provider defined for this encounter.  Primary care provider: Nyasia Wise           Assessment and Plan:     Sleep Diagnoses:  Insomnia (OSORIO 15)    Comorbid conditions:  Psoriasis  Hyperlipidemia    Summary Recommendations:  You will be scheduled for first visit for cognitive behavioral therapy for insomnia with Dr. Xiang Gardner; there are currently several months delay in this referral.  In the meantime:  Continue to moved to couch and consider using podcast rather than lighted screens in the evenings if you have mid sleep awakenings  Return to all the information on cognitive behavioral therapy for insomnia and consider engaging with the CBT-I     Counseling: We reviewed all cognitive and behavioral approaches to insomnia including stimulus control, restricted time in bed and cognitive restructuring.  We did not feel that retrial of SSRI for perimenopausal state would be useful given her current symptomatology and absence of response to trazodone.  Since her symptoms revolve primarily around insomnia         History of Present Illness:     Swetha Flores is a 54 year old perimenopausal female with \"hot\" symptoms causing 4 awakening per week and more prolonged midsleep awakenings 10-15 min or  for hours on 8/10 days per week.  She has a family history of insomnia.  She is previously tried SSRI of trazodone for the symptoms with grogginess and no improvement.  With prolonged awakenings she sits up he uses meditation or reading with a lighted screen often on a couch across the room until she feels sleepy.  She sometimes remains in bed for more prolonged periods of time with an active mind and some anxiety frustration with inability to fall asleep.  " If she has several days in a row with insufficient sleep she has a rebound prolonged sleep night with activation of her migrainous headaches in the morning.  She does not describe limb restlessness or pain symptoms.    She is rarely noted to snore although she did have previously documented nonpositional mild obstructive sleep apnea without hypoxemia in 2023 without subsequent weight gain.  She was referred at that time for mandibular advancement device therapy without improvement in her insomnia symptoms and with some intolerable aching.    SLEEP-WAKE SCHEDULE:   Workday bedtime 1030   with short latency and mid sleep awakenings and final awakenings 5 am with resolution     PREVIOUS HOME SLEEP STUDIES:  Date: 5/4/2023  AHI: 6.3 without hypoxemia    Most Recent SCALES:      EPWORTH SLEEPINESS SCALE         5/27/2025     1:48 PM    [place] Sleepiness Scale ( MARILEE Dewey  [id number]<br>ESS - [place]/English - Final version - 21 Nov 07 - [facility] [place].)   Sitting and reading Slight chance of dozing   Watching TV Moderate chance of dozing   Sitting, inactive in a public place (e.g. a theatre or a meeting) Would never doze   As a passenger in a car for an hour without a break Moderate chance of dozing   Lying down to rest in the afternoon when circumstances permit High chance of dozing   Sitting and talking to someone Would never doze   Sitting quietly after a lunch without alcohol Slight chance of dozing   In a car, while stopped for a few minutes in traffic Would never doze   [place] Score (MC) 9   [place] Score (Sleep) 9        Patient-reported         INSOMNIA SEVERITY INDEX (OSORIO)          5/27/2025     1:47 PM   Insomnia Severity Index (OSORIO)   Difficulty falling asleep 0   Difficulty staying asleep 1   Problems waking up too early 3   How SATISFIED/DISSATISFIED are you with your CURRENT sleep pattern? 3   How NOTICEABLE to others do you think your sleep problem is in terms of impairing the quality of your life?  2   How WORRIED/DISTRESSED are you about your current sleep problem? 3   To what extent do you consider your sleep problem to INTERFERE with your daily functioning (e.g. daytime fatigue, mood, ability to function at work/daily chores, concentration, memory, mood, etc.) CURRENTLY? 3   OSORIO Total Score 15        Patient-reported       Guidelines for Scoring/Interpretation:  Total score categories:  0-7 = No clinically significant insomnia   8-14 = Subthreshold insomnia   15-21 = Clinical insomnia (moderate severity)  22-28 = Clinical insomnia (severe)  Used via courtesy of www.KiteBitealth.va.gov with permission from Sean Peterson PhD., [facility]      STOP BANG         5/27/2025     2:00 PM   STOP BANG Questionnaire (  2008, the American Society of Anesthesiologists, Inc. [employer]   B/P Clinic: 109/71   BMI Clinic: 24.24               Medications:     Current Outpatient Medications   Medication Sig Dispense Refill    ALPRAZolam (XANAX) 0.5 MG tablet TAKE 1 TABLET BY MOUTH 2 TIMES DAILY AS NEEDED FOR ANXIETY. (Patient not taking: Reported on 3/11/2025) 10 tablet 0    aspirin 81 MG EC tablet Take 1 tablet (81 mg) by mouth daily. 90 tablet 3    rosuvastatin (CRESTOR) 20 MG tablet Take 1 tablet (20 mg) by mouth daily. 30 tablet 3    SYNTHROID 100 MCG tablet TAKE 1 TABLET BY MOUTH EVERY DAY 90 tablet 3    traZODone (DESYREL) 50 MG tablet Take 1-2 tablets ( mg) by mouth at bedtime. 60 tablet 3    Vitamin D, Cholecalciferol, 25 MCG (1000 UT) CAPS Take by mouth.       No current facility-administered medications for this visit.        Allergies   Allergen Reactions    Seasonal Allergies     Barley Grass Rash            Problem List:     Patient Active Problem List   Diagnosis    Hypothyroidism    Hyperlipidemia LDL goal <70    Vitamin D deficiency    Psoriasis    Bilateral breast cysts    Pain in joint involving ankle and foot, left    Elevated blood-pressure reading without diagnosis of  hypertension    Coronary artery disease due to calcified coronary lesion    Elevated lipoprotein A level    Elevated coronary artery calcium score            Past Medical History:     Does not need 02 supplement at night   Past Medical History:   Diagnosis Date    Ankle pain 9/1/2009    Anxiety 4/4/2011    Arthropathy, unspecified, site unspecified     has been to PT without much relief.    Cervicalgia 5/23/2007    Concussion 2017    Hyperlipidemia LDL goal <160 10/31/2010    Hypertension 2004    Only during my second pregnancy    JOINT PAIN-LOWER LEG 3/21/2007    JOINT PAIN-PELVIS 4/9/2008    Unspecified hypothyroidism              Past Surgical History:    No h/o  upper airway surgery  Past Surgical History:   Procedure Laterality Date    [facility] NONSPECIFIC PROCEDURE  2001, 2004    2 NSVDs               Physical Examination:     Mandibular profile  Reported vitals:  There were no vitals taken for this visit.   GENERAL: alert and no distress  EYES: Eyes grossly normal to inspection.  No discharge or erythema, or obvious scleral/conjunctival abnormalities.  RESP: No audible wheeze, cough, or visible cyanosis.    SKIN: Visible skin clear. No significant rash, abnormal pigmentation or lesions.  NEURO: Cranial nerves grossly intact.  Mentation and speech appropriate for age.  PSYCH: Appropriate affect, tone, and pace of words        Copy to: Nyasia Wise MD 5/27/2025         Total time spent reviewing medical records including previous testing and interpretation as well as direct patient contact and documentation on this date: 45 minutes

## 2025-05-27 NOTE — NURSING NOTE
"Chief Complaint   Patient presents with    Sleep Problem     Chronic sleep issues, wakes multiple times during night        Initial /71   Pulse 67   Resp 15   Ht 1.499 m (4' 11\")   Wt 54.4 kg (120 lb)   SpO2 98%   BMI 24.24 kg/m   Estimated body mass index is 24.24 kg/m  as calculated from the following:    Height as of this encounter: 1.499 m (4' 11\").    Weight as of this encounter: 54.4 kg (120 lb).    Medication Reconciliation: complete      DME: N/A      LOLA Mcneal      "

## 2025-05-27 NOTE — PATIENT INSTRUCTIONS
[facility] Brief CBT-I  Introductory Module    Understanding Sleep and Insomnia    Most people have trouble sleeping at some point in their life.   Chronic insomnia means you have had trouble falling asleep and/or staying asleep for at least the past three months.  Despite allowing enough time for sleep, it is affecting how you feel. You are not alone.  It is estimated that 10-15% of adults experience chronic insomnia.     Cognitive-Behavioral Therapy for Insomnia    The American College of Physicians  recommends Cognitive Behavioral Therapy for Insomnia (CBT-I) as the first-line treatment for insomnia.     [facility] Brief CBT-I is a five-step program involving an initial insomnia assessment and four treatment sessions.  It is designed to be completed with the guidance of a trained health care provider. The program will teach you the skills and strategies you will need for a better night's sleep. The first step in your program involves an assessment of your insomnia and learning a bit about sleep, insomnia and CBT-I.      The Basics of Sleep    How does sleep help us?    Sleep, like food and water, is something we need every day but whose purpose is not exactly clear.  Sleep experts agree we need consistent quality sleep to function at our best. There is evidence that sleep helps maintain brain and body functions.  It helps maintain thinking ability and mood.  Sleep is a very active state that involves four stages that cycle every  minutes throughout the night. We get our deepest sleep during the first few hours of sleep.  During the last half of our sleep, we usually get the bulk of our REM (Rapid Eye Movement) sleep.  REM sleep is when most of our dreaming occurs.     Watch the following short video to learn more:  Stages of Sleep      How much sleep do we need?    Sleep needs vary from person to person. Most adults need at least 7 hours of sleep though some may need less and others more.   Sleeping too little or too much may be a health risk.  As we age, most people report their sleep gets lighter, earlier, shorter, and more restless.     What Controls Sleep?    The three things that regulate your sleep are your:     Sleep Drive  Biological Clock  Arousal System (physical and emotional states)    Together these make us feel alert during the day and promote sleep at night.    Your sleep drive depends on how long you have been awake. It is lowest when you first wake up.  Sleep drive increases as the day goes on.  The longer time you are awake the easier it is to fall asleep.  Sleeping gradually reduces your sleep drive. That is why napping in the evening or close to bed can make it harder to sleep at night.    Your biological clock promotes wakefulness during the day and sleep at night.    Adapted from Radha et al. (2009). Evaluation and Management of Insomnia in the Psychiatric setting. Published Online: 19/1/2009; DOI: https://doi.org/10.1176/foc.7.4.jkg045UP    Watch the following short video to learn more: Two  Processes of Sleep    How does sleep change with age?    Sleep usually is lighter and shorter and earlier.  Sleep may become more restless   Increased time to fall asleep and more time awake during the night    Understanding Insomnia    What is insomnia?    Insomnia occurs when you:    Have difficulty with...  Falling Asleep  Staying Asleep  Or short amount of sleep    Have adequate time for sleep  Experience daytime problems as a result of your sleep difficulties    What causes insomnia?    Some people have a greater chance of developing insomnia due to biological, psychological or social factors. These are often referred to as predisposing factors.  A host of things can trigger insomnia such as a stressful event, jet lag, working a different shift, medication, or the onset of a medical or mental health condition. These are called precipitating factors.        What maintains  "insomnia?    Short-term insomnia can become chronic because of habits or conditions that perpetuate it including:    Unhelpful sleep habits such as spending more time in bed and sleeping in on weekends to try to catch up on sleep  Stress, worry or depression  Worry or fear about your insomnia  Pain or other medical conditions  Some medications  Untreated sleep disorders    As you spend more time in bed or try forcing yourself to sleep your bed becomes linked with wakefulness.  This type of  conditioning  along with unhelpful sleep habits maintains the insomnia even when the triggering event has resolved.    Sleep and Your Arousal System    Mental activity, emotions and physical symptoms can make your brain too active to sleep by masking the strength of your sleep drive. Your brain's arousal system can triggers insomnia and plan a role in maintaining it.  Common sources of arousal include:    Worry about sleep  An active mind concerned about unfinished tasks  Anxiety, stress and depression  Pain    The Effect of Behavioral Conditioning    When you lay awake in bed over many nights, your body actually becomes trained or 'conditioned' to be awake during the night.  It makes your bed trigger alertness instead of sleepiness.  CBT-I helps strengthen the behavioral association between sleep and your bed.    Habits that HURT sleep:    Using your bed for things other than sleep and intimacy  Shifting sleep schedules from night to night  Extending time in bed  Worrying  Worrying about sleep or trying too hard to sleep  Lack of a \"wind down\" time before bed  Long or late naps  Uncomfortable sleep environment  Alcohol  Caffeine    Developing habits that HELP your sleep:    Keeping the bed for sleep and intimacy  Maintaining a consistent sleep schedule  Daily routines including time to wind down and manage worry  Managing thoughts and expectations about sleep    Common Treatments for Insomnia    Behavioral:  Changing your " "behavior and habits to improve your sleep  Sleeping Pills (Prescription and OTC)  Antidepressants   \"Alternative\" remedies (Melatonin, Ashwagandha, Chamomile, Valerian, 5-HTP etc.)    How CBT-I Works     CBT-I targets negative behavioral conditioning and habits that hurt your sleep and lead to long-term insomnia     How long will it take to work?    Research shows that making significant changes in sleep habits takes time and effort. You  may see improvement within 2-3 weeks but will need to maintain these new habits over time for the best results.  Progress is not always steady. Don't lose heart if you experience minor setbacks along the way. While sleep medications may work faster, they often come with considerable side effects and are less effective over time requiring  increasing the dose or switching to a different medication.   The evidence is clear that CBT-I is the safest and most effective long-term solution for treatment of insomnia.     Are there any side effects?    CBT-I has been shown to be a safe and effective treatment with few side effects.  The most common early side effect you may experience is a short term increase in daytime sleepiness.  It will be important for you to manage you sleepiness by avoiding driving or operating equipment if sleepy or drowsy.     Is CBT-I right for you?    CBT-I has been shown to be effective in treating insomnia across a variety of age groups and with individuals who have other health conditions.    You may be a good candidate for CBT-I if:    You believe CBT-I can help you sleep better.  You are motivated to follow a four-session behavioral treatment program based on your insomnia assessment..   You are able (or have assistance) to complete a daily sleep diary  Your Medical and/or mental health conditions are stable and treated.  You do not have untreated Sleep Apnea or Restless Leg syndrome.    There are several health conditions where CBT-I may not be " indicated:    Bipolar Disorder  Seizure Disorder  Shift-work Sleep Disorder  Sleep Walking  Night Terrors  Excessive Daytime Sleepiness    Tracking your Sleep     Sleep tracking is an essential part of your CBT-I program. There are several ways to keep track of your sleep during treatment with your health care provider. Whatever method you use, it is best to record your information first thing when you get up in the morning.    CBT-i     The CBT-I  ana is a convenient way to keep track of your sleep during treatment with your health care provider. It also has helpful information and tools that make it a great digital  to your treatment.  Download the free ana on your Apple or Android phone and watch the following video before using it:     CBT-i  Introduction      Go to the Settings section and turn on the setting Working with CBT-i Provider, Record information each morning by pressing the Sleep Diary icon. Do not not watch or monitor the clock in the middle of the night while keeping your sleep diary. You can customize your sleep dairy in the Settings section of your ana to add information such as caffeine use, alcohol use or sleep medications taken.  It will be important to gather at least a week of sleep diary information before the next step of your program.         You can email your sleep diary data to yourself prior to your visit by using the [place] User Data function found in the Settings section.  These data will be used to establish your core sleep training plan in the next module.    [facility] Sleep Diary     The [facility]ensus Sleep Diary was developed by a team of sleep experts across the country to collect the most helpful sleep information.  This diary is available in a convenient web-based application you can use on all computer and mobile devices.  Like the CBT-I  ana, you can export and email your data to yourself and for your provider.    Copy and paste the [facility] Sleep  Diary Link into your browser:  [email or web address]        [facility] Paper Sleep Diary        Mobile and Wearable Sleep Tracking Devices    There are many wearable and mobile apps that estimate the time, amount and quality of sleep.  They do so largely by recording and analyzing your body movement. Unlike a laboratory sleep study, these devices cannot accurately measure stages of sleep.  Wearable device and mobile apps can be helpful in estimating the time and amount of sleep at night. They can be used along with your CBT-i , [facility] Sleep Diary or Paper sleep diary.    CBT-I and Sleeping Pills    Sleep medications can be helpful in the short-term but often stop working in the long-term.  Sleeping pills treat symptoms and not the underlying cause of insomnia.  They also can have side effects that may last well into the day. Abruptly stopping sleeping pills can cause temporary rebound insomnia and lead to increased distress about sleeplessness.  This in turn strengthens the belief that pills are necessary for sleep.    Many patients choose to discontinue sleeping pills prior to beginning CBT-I.  However, discontinuing sleep medication is not the goal of CBT-I.  About 50% of patients end up decreasing or discontinuing their sleep medication use. You should talk to your prescribing provider before tapering or discontinuing sleep medication.     Introductory Module Homework    Keep track of your sleep every morning  until your next visit using the CBT-i  ana, [facility] Sleep Diary web-based ana, or paper sleep diary.

## 2025-06-16 ENCOUNTER — TELEPHONE (OUTPATIENT)
Dept: PEDIATRICS | Facility: CLINIC | Age: 55
End: 2025-06-16
Payer: COMMERCIAL

## 2025-06-16 NOTE — TELEPHONE ENCOUNTER
Contacts       Contact Date/Time Type Contact Phone/Fax    06/16/2025 10:13 AM CDT Phone (Outgoing) Swetha Flores (Self) [phone] (M)    Left Message           Attempted to reach patient to: Reschedule an appointment    LVM to see if we could move pt's 7/14 appointment up by 30 min (1:10 arrival).     Please transfer to TC line.

## 2025-07-14 ENCOUNTER — OFFICE VISIT (OUTPATIENT)
Dept: PEDIATRICS | Facility: CLINIC | Age: 55
End: 2025-07-14
Payer: COMMERCIAL

## 2025-07-14 VITALS
DIASTOLIC BLOOD PRESSURE: 69 MMHG | WEIGHT: 118.4 LBS | OXYGEN SATURATION: 100 % | TEMPERATURE: 97.7 F | HEART RATE: 66 BPM | BODY MASS INDEX: 24.86 KG/M2 | HEIGHT: 58 IN | SYSTOLIC BLOOD PRESSURE: 102 MMHG | RESPIRATION RATE: 16 BRPM

## 2025-07-14 DIAGNOSIS — Z12.11 COLON CANCER SCREENING: ICD-10-CM

## 2025-07-14 DIAGNOSIS — N95.2 VAGINAL ATROPHY: ICD-10-CM

## 2025-07-14 DIAGNOSIS — E03.9 HYPOTHYROIDISM, UNSPECIFIED TYPE: ICD-10-CM

## 2025-07-14 DIAGNOSIS — N94.10 DYSPAREUNIA IN FEMALE: ICD-10-CM

## 2025-07-14 DIAGNOSIS — E78.41 ELEVATED LIPOPROTEIN A LEVEL: ICD-10-CM

## 2025-07-14 DIAGNOSIS — G47.00 INSOMNIA, UNSPECIFIED TYPE: ICD-10-CM

## 2025-07-14 DIAGNOSIS — Z00.00 ROUTINE GENERAL MEDICAL EXAMINATION AT A HEALTH CARE FACILITY: Primary | ICD-10-CM

## 2025-07-14 DIAGNOSIS — Z12.31 VISIT FOR SCREENING MAMMOGRAM: ICD-10-CM

## 2025-07-14 DIAGNOSIS — R93.1 ELEVATED CORONARY ARTERY CALCIUM SCORE: ICD-10-CM

## 2025-07-14 LAB
ALBUMIN SERPL BCG-MCNC: 4.2 G/DL (ref 3.5–5.2)
ALP SERPL-CCNC: 62 U/L (ref 40–150)
ALT SERPL W P-5'-P-CCNC: 36 U/L (ref 0–50)
ANION GAP SERPL CALCULATED.3IONS-SCNC: 10 MMOL/L (ref 7–15)
AST SERPL W P-5'-P-CCNC: 29 U/L (ref 0–45)
BILIRUB SERPL-MCNC: 0.3 MG/DL
BUN SERPL-MCNC: 10.7 MG/DL (ref 6–20)
CALCIUM SERPL-MCNC: 9.8 MG/DL (ref 8.8–10.4)
CHLORIDE SERPL-SCNC: 105 MMOL/L (ref 98–107)
CREAT SERPL-MCNC: 0.73 MG/DL (ref 0.51–0.95)
EGFRCR SERPLBLD CKD-EPI 2021: >90 ML/MIN/1.73M2
ERYTHROCYTE [DISTWIDTH] IN BLOOD BY AUTOMATED COUNT: 12.9 % (ref 10–15)
GLUCOSE SERPL-MCNC: 83 MG/DL (ref 70–99)
HCO3 SERPL-SCNC: 27 MMOL/L (ref 22–29)
HCT VFR BLD AUTO: 36.5 % (ref 35–47)
HGB BLD-MCNC: 12.6 G/DL (ref 11.7–15.7)
MCH RBC QN AUTO: 29.7 PG (ref 26.5–33)
MCHC RBC AUTO-ENTMCNC: 34.5 G/DL (ref 31.5–36.5)
MCV RBC AUTO: 86 FL (ref 78–100)
PLATELET # BLD AUTO: 164 10E3/UL (ref 150–450)
POTASSIUM SERPL-SCNC: 4.5 MMOL/L (ref 3.4–5.3)
PROT SERPL-MCNC: 7 G/DL (ref 6.4–8.3)
RBC # BLD AUTO: 4.24 10E6/UL (ref 3.8–5.2)
SODIUM SERPL-SCNC: 142 MMOL/L (ref 135–145)
TSH SERPL DL<=0.005 MIU/L-ACNC: 0.52 UIU/ML (ref 0.3–4.2)
VIT D+METAB SERPL-MCNC: 33 NG/ML (ref 20–50)
WBC # BLD AUTO: 5.8 10E3/UL (ref 4–11)

## 2025-07-14 PROCEDURE — 99396 PREV VISIT EST AGE 40-64: CPT | Performed by: PEDIATRICS

## 2025-07-14 PROCEDURE — 80053 COMPREHEN METABOLIC PANEL: CPT | Performed by: PEDIATRICS

## 2025-07-14 PROCEDURE — 99213 OFFICE O/P EST LOW 20 MIN: CPT | Mod: 25 | Performed by: PEDIATRICS

## 2025-07-14 PROCEDURE — 1125F AMNT PAIN NOTED PAIN PRSNT: CPT | Performed by: PEDIATRICS

## 2025-07-14 PROCEDURE — 3074F SYST BP LT 130 MM HG: CPT | Performed by: PEDIATRICS

## 2025-07-14 PROCEDURE — 36415 COLL VENOUS BLD VENIPUNCTURE: CPT | Performed by: PEDIATRICS

## 2025-07-14 PROCEDURE — 84443 ASSAY THYROID STIM HORMONE: CPT | Performed by: PEDIATRICS

## 2025-07-14 PROCEDURE — G2211 COMPLEX E/M VISIT ADD ON: HCPCS | Performed by: PEDIATRICS

## 2025-07-14 PROCEDURE — 82306 VITAMIN D 25 HYDROXY: CPT | Performed by: PEDIATRICS

## 2025-07-14 PROCEDURE — 3078F DIAST BP <80 MM HG: CPT | Performed by: PEDIATRICS

## 2025-07-14 PROCEDURE — 85027 COMPLETE CBC AUTOMATED: CPT | Performed by: PEDIATRICS

## 2025-07-14 RX ORDER — ESTRADIOL 0.1 MG/G
CREAM VAGINAL
Qty: 42.5 G | Refills: 11 | Status: SHIPPED | OUTPATIENT
Start: 2025-07-14

## 2025-07-14 SDOH — HEALTH STABILITY: PHYSICAL HEALTH: ON AVERAGE, HOW MANY DAYS PER WEEK DO YOU ENGAGE IN MODERATE TO STRENUOUS EXERCISE (LIKE A BRISK WALK)?: 4 DAYS

## 2025-07-14 ASSESSMENT — SOCIAL DETERMINANTS OF HEALTH (SDOH): HOW OFTEN DO YOU GET TOGETHER WITH FRIENDS OR RELATIVES?: ONCE A WEEK

## 2025-07-14 ASSESSMENT — PAIN SCALES - GENERAL: PAINLEVEL_OUTOF10: MILD PAIN (3)

## 2025-07-14 NOTE — PATIENT INSTRUCTIONS
Think about Shingrix and Epfxaal10 vaccines    Some labs today, fasting cholesterol in a couple months    Mammogram and Cologuard    Menopause Manifesto, [facility] [place]      Think about trial of vaginal estrogen - print out script given    Pelvic floor PHYSICAL THERAPY orders placed      Patient Education   Preventive Care Advice   This is general advice given by our system to help you stay healthy. However, your care team may have specific advice just for you. Please talk to your care team about your preventive care needs.  Nutrition  Eat 5 or more servings of fruits and vegetables each day.  Try wheat bread, brown rice and whole grain pasta (instead of white bread, rice, and pasta).  Get enough calcium and vitamin D. Check the label on foods and aim for 100% of the RDA (recommended daily allowance).  Lifestyle  Exercise at least 150 minutes each week  (30 minutes a day, 5 days a week).  Do muscle strengthening activities 2 days a week. These help control your weight and prevent disease.  No smoking.  Wear sunscreen to prevent skin cancer.  Have a dental exam and cleaning every 6 months.  Yearly exams  See your health care team every year to talk about:  Any changes in your health.  Any medicines your care team has prescribed.  Preventive care, family planning, and ways to prevent chronic diseases.  Shots (vaccines)   HPV shots (up to age 26), if you've never had them before.  Hepatitis B shots (up to age 59), if you've never had them before.  COVID-19 shot: Get this shot when it's due.  Flu shot: Get a flu shot every year.  Tetanus shot: Get a tetanus shot every 10 years.  Pneumococcal, hepatitis A, and RSV shots: Ask your care team if you need these based on your risk.  Shingles shot (for age 50 and up)  General health tests  Diabetes screening:  Starting at age 35, Get screened for diabetes at least every 3 years.  If you are younger than age 35, ask your care team if you should be screened for  diabetes.  Cholesterol test: At age 39, start having a cholesterol test every 5 years, or more often if advised.  Bone density scan (DEXA): At age 50, ask your care team if you should have this scan for osteoporosis (brittle bones).  Hepatitis C: Get tested at least once in your life.  STIs (sexually transmitted infections)  Before age 24: Ask your care team if you should be screened for STIs.  After age 24: Get screened for STIs if you're at risk. You are at risk for STIs (including HIV) if:  You are sexually active with more than one person.  You don't use condoms every time.  You or a partner was diagnosed with a sexually transmitted infection.  If you are at risk for HIV, ask about PrEP medicine to prevent HIV.  Get tested for HIV at least once in your life, whether you are at risk for HIV or not.  Cancer screening tests  Cervical cancer screening: If you have a cervix, begin getting regular cervical cancer screening tests starting at age 21.  Breast cancer scan (mammogram): If you've ever had breasts, begin having regular mammograms starting at age 40. This is a scan to check for breast cancer.  Colon cancer screening: It is important to start screening for colon cancer at age 45.  Have a colonoscopy test every 10 years (or more often if you're at risk) Or, ask your provider about stool tests like a FIT test every year or Cologuard test every 3 years.  To learn more about your testing options, visit:   .  For help making a decision, visit:   https://bit.ly/zo03195.  Prostate cancer screening test: If you have a prostate, ask your care team if a prostate cancer screening test (PSA) at age 55 is right for you.  Lung cancer screening: If you are a current or former smoker ages 50 to 80, ask your care team if ongoing lung cancer screenings are right for you.  For informational purposes only. Not to replace the advice of your health care provider. Copyright   2023 [employer]. All rights reserved.  Clinically reviewed by the [facility] Transitions Program. [employer] [id number] - REV 01/24.

## 2025-07-14 NOTE — PROGRESS NOTES
Preventive Care Visit  [facility] RASHID Wise MD, Internal Medicine - Pediatrics  Jul 14, 2025      Assessment & Plan       ICD-10-CM    1. Routine general medical examination at a health care facility  Z00.00 PRIMARY CARE FOLLOW-UP SCHEDULING     MA Screen Bilateral w/Vladimir     COLOGUARD([employer]ACT SCIENCES)     TSH with free T4 reflex     Comprehensive metabolic panel (BMP + Alb, Alk Phos, ALT, AST, Total. Bili, TP)     Vitamin D Deficiency     Lipid panel reflex to direct LDL Fasting     CBC with platelets      2. Colon cancer screening  Z12.11 COLOGUARD(EXACT SCIENCES)      3. Visit for screening mammogram  Z12.31 MA Screen Bilateral w/Vladimir      4. Hypothyroidism, unspecified type  E03.9 TSH with free T4 reflex  Patient wishing to decrease dose if possible - will check labs and adjust based on results      5. Elevated lipoprotein A level  E78.41 Working with Dr Devine.  Plan lipids in 2-3 months.        6. Elevated coronary artery calcium score  R93.1 On statin      7. Insomnia, unspecified type  G47.00 Starting CBTi      8. Vaginal atrophy  N95.2 estradiol (ESTRACE) 0.1 MG/GM vaginal cream    Plan trial of estrace vaginal cream      9. Dyspareunia in female  N94.10 Physical Therapy  Referral    Plan vaginal cream and pelvic floor physical therapy - if no improvement, Ob/Gyn        The longitudinal plan of care for the diagnosis(es)/condition(s) as documented were addressed during this visit. Due to the added complexity in care, I will continue to support Swetha in the subsequent management and with ongoing continuity of care.     Counseling  Appropriate preventive services were addressed with this patient via screening, questionnaire, or discussion as appropriate       Subjective   Swetha is a 54 year old, presenting for the following:  Physical        7/14/2025     1:23 PM   Additional Questions   Roomed by maryam   Accompanied by na         7/14/2025     1:23 PM   Patient  Reported Additional Medications   Patient reports taking the following new medications na        Via the Health Maintenance questionnaire, the patient has reported the following services have been completed -Mammogram: [facility] 2024-01-01, this information has not been sent to the abstraction team.    HPI    Advance Care Planning    Discussed advance care planning with patient; informed AVS has link to [facility].        7/14/2025   General Health   How would you rate your overall physical health? (!) FAIR   Feel stress (tense, anxious, or unable to sleep) To some extent   (!) STRESS CONCERN      7/14/2025   Nutrition   Three or more servings of calcium each day? Yes   Diet: Low salt    Low fat/cholesterol    Vegetarian/vegan   How many servings of fruit and vegetables per day? 4 or more   How many sweetened beverages each day? 0-1       Multiple values from one day are sorted in reverse-chronological order         7/14/2025   Exercise   Days per week of moderate/strenous exercise 4 days         7/14/2025   Social Factors   Frequency of gathering with friends or relatives Once a week   Worry food won't last until get money to buy more No   Food not last or not have enough money for food? No   Do you have housing? (Housing is defined as stable permanent housing and does not include staying outside in a car, in a tent, in an abandoned building, in an overnight shelter, or couch-surfing.) Yes   Are you worried about losing your housing? No   Lack of transportation? No   Unable to get utilities (heat,electricity)? No         7/14/2025   Fall Risk   Fallen 2 or more times in the past year? No   Trouble with walking or balance? No          7/14/2025   Dental   Dentist two times every year? (!) NO           Today's PHQ-2 Score:       1/20/2025     9:46 AM   PHQ-2 ( 1999 [employer])   Q1: Little interest or pleasure in doing things 0   Q2: Feeling down, depressed or hopeless 0   PHQ-2 Score 0         7/14/2025   Substance  Use   Alcohol more than 3/day or more than 7/wk Not Applicable   Do you use any other substances recreationally? No     Social History     Tobacco Use    Smoking status: Never    Smokeless tobacco: Never   Vaping Use    Vaping status: Never Used   Substance Use Topics    Alcohol use: No    Drug use: No           12/21/2021   LAST FHS-7 RESULTS   1st degree relative breast or ovarian cancer No   Any relative bilateral breast cancer No   Any male have breast cancer No   Any ONE woman have BOTH breast AND ovarian cancer No   Any woman with breast cancer before 50yrs No   2 or more relatives with breast AND/OR ovarian cancer No   2 or more relatives with breast AND/OR bowel cancer No        Mammogram Screening - Mammogram every 1-2 years updated in Health Maintenance based on mutual decision making        7/14/2025   STI Screening   New sexual partner(s) since last STI/HIV test? No     History of abnormal Pap smear: No - age 30- 64 PAP with HPV every 5 years recommended        Latest Ref Rng & Units 3/8/2022    11:20 AM 3/16/2015     9:51 AM 3/16/2015    12:00 AM   PAP / HPV   PAP  Negative for Intraepithelial Lesion or Malignancy (NILM)      PAP (Historical)    NIL    HPV 16 DNA Negative Negative  Negative     HPV 18 DNA Negative Negative  Negative     Other HR HPV Negative Negative  Negative       ASCVD Risk   The 10-year ASCVD risk score (Suleman DK, et al., 2019) is: 0.5%    Values used to calculate the score:      Age: 54 years      Sex: Female      Is Non- : No      Diabetic: No      Tobacco smoker: No      Systolic Blood Pressure: 102 mmHg      Is BP treated: No      HDL Cholesterol: 70 mg/dL      Total Cholesterol: 151 mg/dL        Reviewed and updated as needed this visit by Provider                    Following with PHYSICAL THERAPY for patellofemoral syndrome    Seeing Dr Devine for lipids, elevated Lipoprotein a score, elevated coronary calcium scoring.  On rosuvastatin 20mg  "daily and wishing to decrease dose if possible.  Excellent lifestyle changes    Insomnia - seeing sleep medicine - starting CBTi and starting to get slightly better    Hypothyroidism - no new symptoms of low or high thyroid    Having pain with sex, feels like an obstruction at the introitus.  Hx of episiotomy and complex tear in the past with vaginal delivery.  Last period 12 months ago.  No current bleeding or external vaginal changes     ROS: 10 point ROS neg other than the symptoms noted above in the HPI.       Objective    Exam  /69   Pulse 66   Temp 97.7  F (36.5  C) (Temporal)   Resp 16   Ht 1.473 m (4' 10\")   Wt 53.7 kg (118 lb 6.4 oz)   SpO2 100%   BMI 24.75 kg/m     Estimated body mass index is 24.75 kg/m  as calculated from the following:    Height as of this encounter: 1.473 m (4' 10\").    Weight as of this encounter: 53.7 kg (118 lb 6.4 oz).  Wt Readings from Last 4 Encounters:   07/14/25 53.7 kg (118 lb 6.4 oz)   05/27/25 54.4 kg (120 lb)   01/20/25 57.2 kg (126 lb 1.6 oz)   09/30/24 57.4 kg (126 lb 9.6 oz)         Physical Exam  GENERAL: alert and no distress  EYES: Eyes grossly normal to inspection, PERRL and conjunctivae and sclerae normal  HENT: ear canals and TM's normal, nose and mouth without ulcers or lesions  NECK: no adenopathy, no asymmetry, masses, or scars  RESP: lungs clear to auscultation - no rales, rhonchi or wheezes  BREAST: normal without masses, tenderness or nipple discharge and no palpable axillary masses or adenopathy  CV: regular rate and rhythm, normal S1 S2, no S3 or S4, no murmur, click or rub, no peripheral edema  ABDOMEN: soft, nontender, no hepatosplenomegaly, no masses and bowel sounds normal   (female): normal female external genitalia, normal urethral meatus , and vaginal mucosal atrophy  MS: no gross musculoskeletal defects noted, no edema  SKIN: no suspicious lesions or rashes  NEURO: Normal strength and tone, mentation intact and speech normal  PSYCH: " mentation appears normal, affect normal/bright      Labs pending    Signed Electronically by: Nyasia Wise MD

## 2025-07-19 ENCOUNTER — MYC MEDICAL ADVICE (OUTPATIENT)
Dept: PEDIATRICS | Facility: CLINIC | Age: 55
End: 2025-07-19
Payer: COMMERCIAL

## 2025-07-19 DIAGNOSIS — E03.9 HYPOTHYROIDISM, UNSPECIFIED TYPE: Primary | ICD-10-CM

## 2025-07-21 RX ORDER — LEVOTHYROXINE SODIUM 88 MCG
88 TABLET ORAL
Qty: 90 TABLET | Refills: 3 | Status: SHIPPED | OUTPATIENT
Start: 2025-07-21

## 2025-07-21 NOTE — TELEPHONE ENCOUNTER
Patient requested brand name synthroid.    Pended Synthroid with DIANA. Please review and sign if appropriate.    Romy Arora RN   [facility] [place]

## 2025-07-24 DIAGNOSIS — E78.5 HYPERLIPIDEMIA LDL GOAL <100: ICD-10-CM

## 2025-07-24 RX ORDER — ROSUVASTATIN CALCIUM 20 MG/1
20 TABLET, COATED ORAL DAILY
Qty: 30 TABLET | Refills: 11 | Status: SHIPPED | OUTPATIENT
Start: 2025-07-24